# Patient Record
Sex: FEMALE | Race: WHITE | HISPANIC OR LATINO | Employment: FULL TIME | URBAN - METROPOLITAN AREA
[De-identification: names, ages, dates, MRNs, and addresses within clinical notes are randomized per-mention and may not be internally consistent; named-entity substitution may affect disease eponyms.]

---

## 2017-01-30 ENCOUNTER — ALLSCRIPTS OFFICE VISIT (OUTPATIENT)
Dept: OTHER | Facility: OTHER | Age: 22
End: 2017-01-30

## 2018-01-11 NOTE — MISCELLANEOUS
Reason For Visit  Reason For Visit Free Text Note Form: MET WITH PATIENT DURING POST PARTUM VISIT  PATIENT REPORTED IS DOING GOOD  BABY IS BREAST AND BOTTLE FEEDING  PATIENT DENIES ANY DEPRESSION SIGNS AND SYMPTOMS  CLAIMED HAS GOOD SUPORT  Active Problems    1  Chlamydia infection affecting pregnancy (647 60,079 98) (O98 319,A74 9)   2  Depression during pregnancy in third trimester (648 43) (O99 343,F32 9)   3  External hemorrhoids (455 3) (K64 4)   4  Pregnancy (V22 2) (Z33 1)   5  Screen for STD (sexually transmitted disease) (V74 5) (Z11 3)   6  Supervision of normal pregnancy in third trimester (V22 1) (Z34 83)    Current Meds   1  PNV Prenatal Plus Multivitamin 27-1 MG Oral Tablet; Therapy: (Recorded:10Aug2015) to Recorded    Allergies    1  No Known Drug Allergies    2  No Known Environmental Allergies   3   No Known Food Allergies    Signatures   Electronically signed by : BRAD Duque; Feb 29 2016  3:37PM EST                       (Author)

## 2018-01-15 NOTE — MISCELLANEOUS
Provider Comments  Provider Comments:   PT NO SHOW FOR HER ANNUAL APPT        Signatures   Electronically signed by : Holden Ge, ; Apr 5 2017 11:42AM EST                       (Author)

## 2018-01-15 NOTE — RESULT NOTES
Message  Pt  notified of positive chlamydia result and need for treatment  Rx  sent to pharmacy for zithromax 1 gram po  [facility]  Pt  instructed to not have intercourse until confirmation of partner treatment    Has f/u appointment scheduled      Signatures   Electronically signed by : Denton Nam RN; Jan 25 2016  9:59AM EST                       (Author)

## 2018-01-15 NOTE — MISCELLANEOUS
Reason For Visit  Reason For Visit Free Text Note Form: MET WITH PATIENT FOR FOLLOW UP  PATIENT REPORTED IS READY WITH BABY ITEMS  FEELS GOOD  RELATIONSHIP WITH FOB IS GOING WELL  DISCUSSED POST PARTUM DEPRESSION SIGN AND SYMPTOMS AND ACKNOWLEDGEMENT OF PATERNITY FORM  VERBALIZED UNDERSTANDING  NO QUESTION OR CONCERN AT THIS TIME  WILL FOLLOW UP AS NEEDED  Active Problems    1  Chlamydia infection affecting pregnancy (647 60,079 98) (O98 319,A74 9)   2  Depression during pregnancy in third trimester (648 43) (O99 343)   3  External hemorrhoids (455 3) (K64 4)   4  Pregnancy (V22 2) (Z33 1)   5  Screen for STD (sexually transmitted disease) (V74 5) (Z11 3)   6  Supervision of normal pregnancy in third trimester (V22 1) (Z34 83)    Current Meds   1  PNV Prenatal Plus Multivitamin 27-1 MG Oral Tablet; Therapy: (Recorded:10Aug2015) to Recorded    Allergies    1  No Known Drug Allergies    2  No Known Environmental Allergies   3   No Known Food Allergies    Signatures   Electronically signed by : BRAD Garza; Jan 28 2016  2:14PM EST                       (Author)

## 2018-03-13 ENCOUNTER — HOSPITAL ENCOUNTER (EMERGENCY)
Facility: HOSPITAL | Age: 23
Discharge: HOME | End: 2018-03-13
Attending: EMERGENCY MEDICINE | Admitting: EMERGENCY MEDICINE
Payer: COMMERCIAL

## 2018-03-13 VITALS
OXYGEN SATURATION: 100 % | DIASTOLIC BLOOD PRESSURE: 60 MMHG | BODY MASS INDEX: 27 KG/M2 | TEMPERATURE: 98.6 F | RESPIRATION RATE: 18 BRPM | WEIGHT: 143 LBS | SYSTOLIC BLOOD PRESSURE: 120 MMHG | HEIGHT: 61 IN | HEART RATE: 89 BPM

## 2018-03-13 DIAGNOSIS — R10.9 ABDOMINAL PAIN: Primary | ICD-10-CM

## 2018-03-13 DIAGNOSIS — R11.2 NAUSEA AND VOMITING: ICD-10-CM

## 2018-03-13 DIAGNOSIS — B34.9 VIRAL SYNDROME: ICD-10-CM

## 2018-03-13 LAB
ALBUMIN SERPL BCP-MCNC: 3.9 G/DL (ref 3.5–5)
ALP SERPL-CCNC: 47 U/L (ref 46–116)
ALT SERPL W P-5'-P-CCNC: 27 U/L (ref 12–78)
ANION GAP SERPL CALCULATED.3IONS-SCNC: 5 MMOL/L (ref 4–13)
AST SERPL W P-5'-P-CCNC: 15 U/L (ref 5–45)
BACTERIA UR QL AUTO: ABNORMAL /HPF
BASOPHILS # BLD AUTO: 0.01 THOUSANDS/ΜL (ref 0–0.1)
BASOPHILS NFR BLD AUTO: 0 % (ref 0–1)
BILIRUB SERPL-MCNC: 0.62 MG/DL (ref 0.2–1)
BILIRUB UR QL STRIP: NEGATIVE
BUN SERPL-MCNC: 18 MG/DL (ref 5–25)
CALCIUM SERPL-MCNC: 8.3 MG/DL (ref 8.3–10.1)
CHLORIDE SERPL-SCNC: 105 MMOL/L (ref 100–108)
CLARITY UR: CLEAR
CO2 SERPL-SCNC: 28 MMOL/L (ref 21–32)
COLOR UR: YELLOW
CREAT SERPL-MCNC: 0.61 MG/DL (ref 0.6–1.3)
EOSINOPHIL # BLD AUTO: 0.11 THOUSAND/ΜL (ref 0–0.61)
EOSINOPHIL NFR BLD AUTO: 1 % (ref 0–6)
ERYTHROCYTE [DISTWIDTH] IN BLOOD BY AUTOMATED COUNT: 12.5 % (ref 11.6–15.1)
EXT PREG TEST URINE: NORMAL
GFR SERPL CREATININE-BSD FRML MDRD: 127 ML/MIN/1.73SQ M
GLUCOSE SERPL-MCNC: 106 MG/DL (ref 65–140)
GLUCOSE UR STRIP-MCNC: NEGATIVE MG/DL
HCT VFR BLD AUTO: 42.9 % (ref 34.8–46.1)
HGB BLD-MCNC: 14.7 G/DL (ref 11.5–15.4)
HGB UR QL STRIP.AUTO: NEGATIVE
HYALINE CASTS #/AREA URNS LPF: ABNORMAL /LPF
KETONES UR STRIP-MCNC: NEGATIVE MG/DL
LEUKOCYTE ESTERASE UR QL STRIP: NEGATIVE
LIPASE SERPL-CCNC: 120 U/L (ref 73–393)
LYMPHOCYTES # BLD AUTO: 0.59 THOUSANDS/ΜL (ref 0.6–4.47)
LYMPHOCYTES NFR BLD AUTO: 5 % (ref 14–44)
MCH RBC QN AUTO: 30.4 PG (ref 26.8–34.3)
MCHC RBC AUTO-ENTMCNC: 34.3 G/DL (ref 31.4–37.4)
MCV RBC AUTO: 89 FL (ref 82–98)
MONOCYTES # BLD AUTO: 0.66 THOUSAND/ΜL (ref 0.17–1.22)
MONOCYTES NFR BLD AUTO: 5 % (ref 4–12)
NEUTROPHILS # BLD AUTO: 10.91 THOUSANDS/ΜL (ref 1.85–7.62)
NEUTS SEG NFR BLD AUTO: 89 % (ref 43–75)
NITRITE UR QL STRIP: NEGATIVE
NON-SQ EPI CELLS URNS QL MICRO: ABNORMAL /HPF
NRBC BLD AUTO-RTO: 0 /100 WBCS
PH UR STRIP.AUTO: 8.5 [PH] (ref 4.5–8)
PLATELET # BLD AUTO: 252 THOUSANDS/UL (ref 149–390)
PMV BLD AUTO: 11.4 FL (ref 8.9–12.7)
POTASSIUM SERPL-SCNC: 3.6 MMOL/L (ref 3.5–5.3)
PROT SERPL-MCNC: 7.9 G/DL (ref 6.4–8.2)
PROT UR STRIP-MCNC: ABNORMAL MG/DL
RBC # BLD AUTO: 4.83 MILLION/UL (ref 3.81–5.12)
RBC #/AREA URNS AUTO: ABNORMAL /HPF
SODIUM SERPL-SCNC: 138 MMOL/L (ref 136–145)
SP GR UR STRIP.AUTO: 1.02 (ref 1–1.03)
UROBILINOGEN UR QL STRIP.AUTO: 1 E.U./DL
WBC # BLD AUTO: 12.31 THOUSAND/UL (ref 4.31–10.16)
WBC #/AREA URNS AUTO: ABNORMAL /HPF

## 2018-03-13 PROCEDURE — 96374 THER/PROPH/DIAG INJ IV PUSH: CPT

## 2018-03-13 PROCEDURE — 36415 COLL VENOUS BLD VENIPUNCTURE: CPT | Performed by: EMERGENCY MEDICINE

## 2018-03-13 PROCEDURE — 99284 EMERGENCY DEPT VISIT MOD MDM: CPT

## 2018-03-13 PROCEDURE — 81025 URINE PREGNANCY TEST: CPT | Performed by: EMERGENCY MEDICINE

## 2018-03-13 PROCEDURE — 80053 COMPREHEN METABOLIC PANEL: CPT | Performed by: EMERGENCY MEDICINE

## 2018-03-13 PROCEDURE — 85025 COMPLETE CBC W/AUTO DIFF WBC: CPT | Performed by: EMERGENCY MEDICINE

## 2018-03-13 PROCEDURE — 81002 URINALYSIS NONAUTO W/O SCOPE: CPT | Performed by: EMERGENCY MEDICINE

## 2018-03-13 PROCEDURE — 81001 URINALYSIS AUTO W/SCOPE: CPT

## 2018-03-13 PROCEDURE — 83690 ASSAY OF LIPASE: CPT | Performed by: EMERGENCY MEDICINE

## 2018-03-13 PROCEDURE — 96361 HYDRATE IV INFUSION ADD-ON: CPT

## 2018-03-13 RX ORDER — ONDANSETRON 2 MG/ML
4 INJECTION INTRAMUSCULAR; INTRAVENOUS ONCE
Status: COMPLETED | OUTPATIENT
Start: 2018-03-13 | End: 2018-03-13

## 2018-03-13 RX ORDER — ONDANSETRON 4 MG/1
4 TABLET, ORALLY DISINTEGRATING ORAL EVERY 8 HOURS PRN
Qty: 20 TABLET | Refills: 0 | Status: SHIPPED | OUTPATIENT
Start: 2018-03-13 | End: 2020-05-14

## 2018-03-13 RX ORDER — METOCLOPRAMIDE 10 MG/1
10 TABLET ORAL ONCE
Status: DISCONTINUED | OUTPATIENT
Start: 2018-03-13 | End: 2018-03-13 | Stop reason: HOSPADM

## 2018-03-13 RX ORDER — DICYCLOMINE HCL 20 MG
20 TABLET ORAL EVERY 12 HOURS PRN
Qty: 20 TABLET | Refills: 0 | Status: SHIPPED | OUTPATIENT
Start: 2018-03-13 | End: 2020-05-14

## 2018-03-13 RX ORDER — MAGNESIUM HYDROXIDE/ALUMINUM HYDROXICE/SIMETHICONE 120; 1200; 1200 MG/30ML; MG/30ML; MG/30ML
30 SUSPENSION ORAL ONCE
Status: DISCONTINUED | OUTPATIENT
Start: 2018-03-13 | End: 2018-03-13 | Stop reason: HOSPADM

## 2018-03-13 RX ORDER — DICYCLOMINE HCL 20 MG
20 TABLET ORAL ONCE
Status: COMPLETED | OUTPATIENT
Start: 2018-03-13 | End: 2018-03-13

## 2018-03-13 RX ADMIN — SODIUM CHLORIDE 1000 ML: 0.9 INJECTION, SOLUTION INTRAVENOUS at 15:35

## 2018-03-13 RX ADMIN — DICYCLOMINE HYDROCHLORIDE 20 MG: 20 TABLET ORAL at 15:37

## 2018-03-13 RX ADMIN — ONDANSETRON 4 MG: 2 INJECTION INTRAMUSCULAR; INTRAVENOUS at 15:36

## 2018-03-13 NOTE — ED PROVIDER NOTES
History  Chief Complaint   Patient presents with    Abdominal Pain     Patient complains of upper abdominal pain with nausea and vomiting  Denies any diarrhea  HPI  This is a 57-year-old female presenting for evaluation of abdominal pain  Around 6 am, she started to have sharp pain in her mid abdomen, patient was sleeping and it woke her up  Patient had associated nausea and 4 episodes of vomiting  Abdominal pain feels like a tightening and cramping, goes up and down the abdomen, exacerbated with supine position, but improved with vomiting  Vomit was initially food particles, then more food, and then pepto bismal, and then yellow fluid  Patient still with nausea, and abdominal now is about the same  Pain is persistent  Patient has never had pain like this before  Patient denies diarrhea, had normal BM twice today  No fevers, complains of chills  No chest pain, no SOB  Patient ate pork last night, which last night and was concerned it could be the cause, although she has had pork before without issues, then her mom sugested appendicitis and she got worried, which is why she came to ER  No appendicitis in the family  Denies recent travel, however she states her daughter is ill with URI  No dysuria, no hematuria, no vaginal bleeding or discharge  LMP: 3 days ago  PMH: none  PSH: none  FH: neg for biliary disease  SH: denies smoking, drinking and drug use    None       Past Medical History:   Diagnosis Date    Depression        History reviewed  No pertinent surgical history  History reviewed  No pertinent family history  I have reviewed and agree with the history as documented  Social History   Substance Use Topics    Smoking status: Never Smoker    Smokeless tobacco: Never Used    Alcohol use No        Review of Systems    Constitutional:   Positive for appetite change, chills  Negative for fever  HENT: Negative for congestion, rhinorrhea and sore throat      Eyes: Negative for photophobia, pain and visual disturbance  Respiratory: Negative for cough, chest tightness and shortness of breath  Cardiovascular: Negative for chest pain, palpitations and leg swelling  Gastrointestinal: Positive for abdominal pain, nausea and vomiting  Neg for diarrhea  Genitourinary: Negative for dysuria, flank pain and hematuria  Musculoskeletal: Negative for back pain, neck pain and neck stiffness  Skin: Negative for color change, rash and wound  Neurological: Negative for dizziness, numbness and headaches  All other systems reviewed and are negative      Physical Exam  ED Triage Vitals [03/13/18 1519]   Temperature Pulse Respirations Blood Pressure SpO2   98 6 °F (37 °C) (!) 108 18 129/82 98 %      Temp Source Heart Rate Source Patient Position - Orthostatic VS BP Location FiO2 (%)   Oral Monitor Sitting Left arm --      Pain Score       8           Orthostatic Vital Signs  Vitals:    03/13/18 1519 03/13/18 1648   BP: 129/82 120/60   Pulse: (!) 108 89   Patient Position - Orthostatic VS: Sitting        Physical Exam  /60   Pulse 89   Temp 98 6 °F (37 °C) (Oral)   Resp 18   Ht 5' 1" (1 549 m)   Wt 64 9 kg (143 lb)   SpO2 100%   BMI 27 02 kg/m²     General Appearance:  Alert, cooperative, no distress   Head:  Normocephalic, without obvious abnormality, atraumatic   Eyes:  PERRL, conjunctiva/corneas clear, EOM's intact       Nose: Nares normal, septum midline,mucosa normal, no drainage or sinus tenderness   Throat: Lips, mucosa, and tongue normal; teeth and gums normal   Neck: Supple, symmetrical, trachea midline, no adenopathy   Back:   Symmetric, no curvature, ROM normal, no CVA tenderness   Lungs:   Clear to auscultation bilaterally, respirations unlabored   Heart:  Regular rate and rhythm, S1 and S2 normal, no murmur, rub, or gallop   Abdomen:   Soft, Non distended, there is some right hip pain quadrants, epigastric and left upper quadrant tenderness, no right lower quadrant tenderness, mild suprapubic tenderness,   No rebound or guarding,  Negative Bender sign, bowel sounds active all four quadrants     bedside ultrasound shows normal-appearing gallbladder, no stones, gallbladder wall within normal limits, no signs of cholecystitis   Pelvic: Deferred   Extremities: Extremities normal, atraumatic, no cyanosis or edema   Pulses: 2+ and symmetric   Skin: Skin color, texture, turgor normal, no rashes or lesions   Neurologic:      Psychiatric: Moves all extremities, sensation and strength in tact in all extremities    Normal mood and affect         ED Medications  Medications   sodium chloride 0 9 % bolus 1,000 mL (0 mL Intravenous Stopped 3/13/18 1704)   ondansetron (ZOFRAN) injection 4 mg (4 mg Intravenous Given 3/13/18 1536)   dicyclomine (BENTYL) tablet 20 mg (20 mg Oral Given 3/13/18 1537)       Diagnostic Studies  Results Reviewed     Procedure Component Value Units Date/Time    Urine Microscopic [[id number]]  (Abnormal) Collected:  03/13/18 1653    Lab Status:  Final result Specimen:  Urine from Urine, Other Updated:  03/13/18 1708     RBC, UA None Seen /hpf      WBC, UA 2-4 (A) /hpf      Epithelial Cells Occasional /hpf      Bacteria, UA Occasional /hpf      Hyaline Casts, UA None Seen /lpf     POCT urinalysis dipstick [[id number]]  (Abnormal) Resulted:  03/13/18 1656    Lab Status:  Final result Specimen:  Urine Updated:  03/13/18 1656    POCT pregnancy, urine [[id number]]  (Normal) Resulted:  03/13/18 1656    Lab Status:  Final result Updated:  03/13/18 1656     EXT PREG TEST UR (Ref: Negative) neg    ED Urine Macroscopic [[id number]]  (Abnormal) Collected:  03/13/18 1653    Lab Status:  Final result Specimen:  Urine Updated:  03/13/18 1653     Color, UA Yellow     Clarity, UA Clear     pH, UA 8 5 (H)     Leukocytes, UA Negative     Nitrite, UA Negative     Protein, UA Trace (A) mg/dl      Glucose, UA Negative mg/dl      Ketones, UA Negative mg/dl      Urobilinogen, UA 1 0 E U /dl Bilirubin, UA Negative     Blood, UA Negative     Specific Gravity, UA 1 020    Narrative:       CLINITEK RESULT    Comprehensive metabolic panel [[id number]] Collected:  03/13/18 1534    Lab Status:  Final result Specimen:  Blood from Arm, Left Updated:  03/13/18 1612     Sodium 138 mmol/L      Potassium 3 6 mmol/L      Chloride 105 mmol/L      CO2 28 mmol/L      Anion Gap 5 mmol/L      BUN 18 mg/dL      Creatinine 0 61 mg/dL      Glucose 106 mg/dL      Calcium 8 3 mg/dL      AST 15 U/L      ALT 27 U/L      Alkaline Phosphatase 47 U/L      Total Protein 7 9 g/dL      Albumin 3 9 g/dL      Total Bilirubin 0 62 mg/dL      eGFR 127 ml/min/1 73sq m     Narrative:         National Kidney Disease Education Program recommendations are as follows:  GFR calculation is accurate only with a steady state creatinine  Chronic Kidney disease less than 60 ml/min/1 73 sq  meters  Kidney failure less than 15 ml/min/1 73 sq  meters      Lipase [[id number]]  (Normal) Collected:  03/13/18 1534    Lab Status:  Final result Specimen:  Blood from Arm, Left Updated:  03/13/18 1612     Lipase 120 u/L     CBC and differential [[id number]]  (Abnormal) Collected:  03/13/18 1534    Lab Status:  Final result Specimen:  Blood from Arm, Left Updated:  03/13/18 1608     WBC 12 31 (H) Thousand/uL      RBC 4 83 Million/uL      Hemoglobin 14 7 g/dL      Hematocrit 42 9 %      MCV 89 fL      MCH 30 4 pg      MCHC 34 3 g/dL      RDW 12 5 %      MPV 11 4 fL      Platelets 269 Thousands/uL      nRBC 0 /100 WBCs      Neutrophils Relative 89 (H) %      Lymphocytes Relative 5 (L) %      Monocytes Relative 5 %      Eosinophils Relative 1 %      Basophils Relative 0 %      Neutrophils Absolute 10 91 (H) Thousands/µL      Lymphocytes Absolute 0 59 (L) Thousands/µL      Monocytes Absolute 0 66 Thousand/µL      Eosinophils Absolute 0 11 Thousand/µL      Basophils Absolute 0 01 Thousands/µL                  No orders to display         Procedures  Procedures      Phone Consults  ED Phone Contact    ED Course  ED Course as of Mar 14 6583   Tue Mar 13, 2018   1656  Patient says nausea has resolved  Which she went to the bathroom to urinate, she says the pain did resolve as well  However, when she came back to the bed to lay down, the pain returned  On examination, patient without any abdominal tenderness  Labs and urine  unremarkable  Will discharge home per        MDM   Patient's abdominal likely a viral syndrome  Will evaluate with abdominal labs, UA and urine pregnancy  IV fluids given tachycardic, will give bentyl and zofran, reassess  CritCare Time    Disposition  Final diagnoses:   Abdominal pain   Nausea and vomiting   Viral syndrome     Time reflects when diagnosis was documented in both MDM as applicable and the Disposition within this note     Time User Action Codes Description Comment    3/13/2018  4:49 PM Dewaine Flaming Add [R10 9] Abdominal pain     3/13/2018  4:49 PM Dewaine Flaming Add [R11 2] Nausea and vomiting     3/13/2018  4:49 PM Dewaine Flaming Add [B34 9] Viral syndrome       ED Disposition     ED Disposition Condition Comment    Discharge  Diamond Frank discharge to home/self care      Condition at discharge: Stable        Follow-up Information     Follow up With Specialties Details Why Contact Info Additional [address] [facility], DO Internal Medicine Go in 2 days  [address] Emergency Department Emergency Medicine In 1 day If symptoms worsen [address] [place] [address] ED, [address] [place] [address], [id number]        Discharge Medication List as of 3/13/2018  4:52 PM      START taking these medications    Details   dicyclomine (BENTYL) 20 mg tablet Take 1 tablet (20 mg total) by mouth every 12 (twelve) hours as needed (abd pain), Starting Tue 3/13/2018, Print      ondansetron (ZOFRAN-ODT) 4 mg disintegrating tablet Take 1 tablet (4 mg total) by mouth every 8 (eight) hours as needed for nausea or vomiting, Starting Tue 3/13/2018, Print           No discharge procedures on file  ED Provider  Attending physically available and evaluated Xavi Coe I managed the patient along with the ED Attending      Electronically Signed by         Wilbur Goodell, MD  03/14/18 8788

## 2018-03-13 NOTE — ED ATTENDING ATTESTATION
Padma Carreon MD, saw and evaluated the patient  I have discussed the patient with the resident/non-physician practitioner and agree with the resident's/non-physician practitioner's findings, Plan of Care, and MDM as documented in the resident's/non-physician practitioner's note, except where noted  All available labs and Radiology studies were reviewed  At this point I agree with the current assessment done in the Emergency Department  I have conducted an independent evaluation of this patient a history and physical is as follows:  Pt ha midepigastric abd pain that is relieved with vomiting Pt had 2 normal BM today which is normal for her No blood    No history of surgeries in past   PE: alert nad heart reg lungs clear bd soft tender midepigastric and luq area mdm;w ill check labs and treat symptoms    Critical Care Time  CritCare Time    Procedures

## 2018-03-13 NOTE — DISCHARGE INSTRUCTIONS
Acute Abdominal Pain   WHAT YOU NEED TO KNOW:   The cause of your abdominal pain may not be found  If a cause is found, treatment will depend on what the cause is  DISCHARGE INSTRUCTIONS:   Seek care immediately if:   · You vomit blood or cannot stop vomiting  · You have blood in your bowel movement or it looks like tar  · You have bleeding from your rectum  · Your abdomen is larger than usual, more painful, and hard  · You have severe pain in your abdomen  · You stop passing gas and having bowel movements  · You feel weak, dizzy, or faint  Contact your healthcare provider if:   · You have a fever  · You have new signs and symptoms  · Your symptoms do not get better with treatment  · You have questions or concerns about your condition or care  Medicines  may be given to decrease pain, treat an infection, and manage your symptoms  Take your medicine as directed  Call your healthcare provider if you think your medicine is not helping or if you have side effects  Tell him if you are allergic to any medicine  Keep a list of the medicines, vitamins, and herbs you take  Include the amounts, and when and why you take them  Bring the list or the pill bottles to follow-up visits  Carry your medicine list with you in case of an emergency  Manage your symptoms:   · Apply heat  on your abdomen for 20 to 30 minutes every 2 hours for as many days as directed  Heat helps decrease pain and muscle spasms  · Manage your stress  Stress may cause abdominal pain  Your healthcare provider may recommend relaxation techniques and deep breathing exercises to help decrease your stress  Your healthcare provider may recommend you talk to someone about your stress or anxiety, such as a counselor or a trusted friend  Get plenty of sleep and exercise regularly  · Limit or do not drink alcohol  Alcohol can make your abdominal pain worse   Ask your healthcare provider if it is safe for you to drink alcohol  Also ask how much is safe for you to drink  · Do not smoke  Nicotine and other chemicals in cigarettes can damage your esophagus and stomach  Ask your healthcare provider for information if you currently smoke and need help to quit  E-cigarettes or smokeless tobacco still contain nicotine  Talk to your healthcare provider before you use these products  Make changes to the food you eat as directed:  Do not eat foods that cause abdominal pain or other symptoms  Eat small meals more often  · Eat more high-fiber foods if you are constipated  High-fiber foods include fruits, vegetables, whole-grain foods, and legumes  · Do not eat foods that cause gas if you have bloating  Examples include broccoli, cabbage, and cauliflower  Do not drink soda or carbonated drinks, because these may also cause gas  · Do not eat foods or drinks that contain sorbitol or fructose if you have diarrhea and bloating  Some examples are fruit juices, candy, jelly, and sugar-free gum  · Do not eat high-fat foods, such as fried foods, cheeseburgers, hot dogs, and desserts  · Limit or do not drink caffeine  Caffeine may make symptoms, such as heart burn or nausea, worse  · Drink plenty of liquids to prevent dehydration from diarrhea or vomiting  Ask your healthcare provider how much liquid to drink each day and which liquids are best for you  Follow up with your healthcare provider as directed:  Write down your questions so you remember to ask them during your visits  © 2017 [employer] Information is for End User's use only and may not be sold, redistributed or otherwise used for commercial purposes  All illustrations and images included in CareNotes® are the copyrighted property of [employer]  or Michael Diaz  The above information is an  only  It is not intended as medical advice for individual conditions or treatments   Talk to your doctor, nurse or pharmacist before following any medical regimen to see if it is safe and effective for you  Acute Nausea and Vomiting   WHAT YOU NEED TO KNOW:   What is acute nausea and vomiting? Acute nausea and vomiting starts suddenly, gets worse quickly, and lasts a short time  What are some common causes of acute nausea and vomiting? · Food poisoning    · Large amounts of alcohol    · Certain medicines, too much of any medicine, or stopping a regular medicine too quickly    · Early stages of pregnancy    · Infection in the stomach, intestines, or other organs    · Trauma to the head    · Anxiety or stress    · Gastroparesis (a condition that prevents your stomach from emptying properly)    · Metabolic disorders, such as uremia or adrenal insufficiency  What causes acute nausea and vomiting with stomach pain? · Inflammation of the appendix, gallbladder, stomach, pancreas, kidneys, or other organs    · Gallstones    · Bacteria or a parasite in the digestive system    · Heart attack    · Stomach ulcers, or bowel blockage or twisting  What causes acute nausea and vomiting with other signs and symptoms? You may be sweating and have pale skin, problems with digestion, and more saliva than usual  These signs and symptoms may be caused by the following:  · Problems with your heart rate, blood flow to your heart muscle, blood pressure, or stomach fluid    · Increased pressure or bleeding in the brain    · Swelling of the tissue covering the brain    · Migraine or seizures    · Inner ear disorders that cause problems with balance  How is the cause of acute nausea and vomiting diagnosed? Your healthcare provider will examine you and ask about your medical history  Tell your provider about other signs and symptoms you have  Also tell your provider when you last had nausea and vomiting and how long it continued  Include if it happened before, during, or after a meal, and how much you ate   Your provider will need to know how much came out when you vomited, and if it was fast and forceful  Tell your provider if your vomit smelled like bowel movement or had blood or food in it  Tell your provider if the vomit was bright yellow  You may need any of the following:  · Blood tests  may be used to check for infection or inflammation  · X-ray, CT, or MRI pictures  may be used to find an injury or blockage  How may acute nausea and vomiting be treated? The first goal of treatment for nausea and vomiting is to prevent or treat dehydration  Treatment also depends on the cause of the nausea and vomiting  Any medical condition causing your nausea and vomiting will also be treated  Treatment is also aimed at stopping or preventing your signs and symptoms  You may need one or more of the following:  · Medicines  may be given to calm your stomach and stop your vomiting  You may also need medicines to help you feel more relaxed or to stop nausea and vomiting caused by motion sickness  Gastrointestinal stimulants may be used to help empty your stomach and bowels  This can help decrease your nausea and vomiting  · IV fluids  may be given to replace lost fluids and electrolytes  This may be needed it you cannot drink liquids  · Nasogastric (NG) tube: An NG tube is put into your nose, and passes down your throat until it reaches your stomach  Food and medicine may be given through an NG tube if you cannot take anything by mouth  The tube may instead be attached to suction if caregivers need to keep your stomach empty  What can I do to prevent or manage acute nausea and vomiting? · Do not drink alcohol  Alcohol may upset or irritate your stomach  Too much alcohol can also cause acute nausea and vomiting  · Control stress  Headaches due to stress may cause nausea and vomiting  Find ways to relax and manage your stress  Get more rest and sleep  · Drink more liquids as directed  Vomiting can lead to dehydration   It is important to drink more liquids to help replace lost body fluids  Ask your healthcare provider how much liquid to drink each day and which liquids are best for you  Your provider may recommend that you drink an oral rehydration solution (ORS)  ORS contains water, salts, and sugar that are needed to replace the lost body fluids  Ask what kind of ORS to use, how much to drink, and where to get it  · Eat smaller meals, more often  Eat small amounts of food every 2 to 3 hours, even if you are not hungry  Food in your stomach may decrease your nausea  · Talk to your healthcare provider before you take over-the-counter (OTC) medicines  These medicines can cause serious problems if you use certain other medicines, or you have a medical condition  You may have problems if you use too much or use them for longer than the label says  Follow directions on the label carefully  When should I seek immediate care? · You see blood in your vomit or your bowel movements  · You have sudden, severe pain in your chest and upper abdomen after hard vomiting or retching  · You have swelling in your neck and chest      · You are dizzy, cold, and thirsty, and your eyes and mouth are dry  · You are urinating very little or not at all  · You have muscle weakness, leg cramps, and trouble breathing  · Your heart is beating much faster than normal     · You continue to vomit for more than 48 hours  When should I contact my healthcare provider? · You have frequent dry heaves (vomiting but nothing comes out)  · Your nausea and vomiting does not get better or go away after you use medicine  · You have questions or concerns about your condition or care  CARE AGREEMENT:   You have the right to help plan your care  Learn about your health condition and how it may be treated  Discuss treatment options with your caregivers to decide what care you want to receive  You always have the right to refuse treatment   The above information is an  only  It is not intended as medical advice for individual conditions or treatments  Talk to your doctor, nurse or pharmacist before following any medical regimen to see if it is safe and effective for you  © 2017 [employer]  Information is for End User's use only and may not be sold, redistributed or otherwise used for commercial purposes  All illustrations and images included in CareNotes® are the copyrighted property of [employer]  or [address]  Viral Syndrome   WHAT YOU NEED TO KNOW:   What is viral syndrome? Viral syndrome is a term used for a viral infection that has no clear cause  Viruses are spread easily from person to person through the air and on shared items  What are the signs and symptoms of viral syndrome? Signs and symptoms may start slowly or suddenly and last hours to days  They can be mild to severe and can change over days or hours  You may have any of the following:  · Fever and chills    · A runny or stuffy nose     · Cough, sore throat, or hoarseness     · Headache, or pain and pressure around your eyes     · Muscle aches and joint pain     · Shortness of breath or wheezing     · Abdominal pain, cramps, and diarrhea     · Nausea, vomiting, or loss of appetite  How is viral syndrome diagnosed and treated? Your healthcare provider will ask about your symptoms and examine you  Tell him about any recent travel or insect bites  An illness caused by a virus usually goes away in 10 to 14 days without treatment  You may need medicine to help manage your symptoms such as fever, muscle aches, cough, or congestion  How can I manage my symptoms? · Drink liquids as directed  to prevent dehydration  Ask how much liquid to drink each day and which liquids are best for you  Ask if you should drink an oral rehydration solution (ORS)  An ORS has the right amounts of water, salts, and sugar you need to replace body fluids   This may help prevent dehydration caused by vomiting or diarrhea  Do not drink liquids with caffeine  Liquids with caffeine can make dehydration worse  · Get plenty of rest  to help your body heal  Take naps throughout the day  Ask your healthcare provider when you can return to work and your normal activities  · Use a cool mist humidifier  to help you breathe easier if you have nasal or chest congestion  Ask your healthcare provider how to use a cool mist humidifier  · Eat honey or use cough drops  to help decrease throat discomfort  Ask your healthcare provider how much honey you should eat each day  Cough drops are available without a doctor's order  Follow directions for taking cough drops  · Do not smoke and stay away from others who smoke  Nicotine and other chemicals in cigarettes and cigars can cause lung damage  Smoking can also delay healing  Ask your healthcare provider for information if you currently smoke and need help to quit  E-cigarettes or smokeless tobacco still contain nicotine  Talk to your healthcare provider before you use these products  · Wash your hands frequently  to prevent the spread of germs to others  Use soap and water  Use gel hand  when soap and water are not available  Wash your hands after you use the bathroom, cough, or sneeze  Wash your hands before you prepare or eat food  Call 911 or have someone else call 911 if:   · You have a seizure  · You cannot be woken  · You have chest pain or trouble breathing  When should I seek immediate care? · You have a stiff neck, a bad headache, and sensitivity to light  · You feel weak, dizzy, or confused  · You stop urinating or urinate a lot less than normal      · You cough up blood or thick, yellow or green, mucus  · You have severe abdominal pain or your abdomen is larger than usual   When should I contact my healthcare provider? · Your symptoms do not get better with treatment or get worse after 3 days  · You have a rash or ear pain  · You have burning when you urinate  · You have questions or concerns about your condition or care  CARE AGREEMENT:   You have the right to help plan your care  Learn about your health condition and how it may be treated  Discuss treatment options with your caregivers to decide what care you want to receive  You always have the right to refuse treatment  The above information is an  only  It is not intended as medical advice for individual conditions or treatments  Talk to your doctor, nurse or pharmacist before following any medical regimen to see if it is safe and effective for you  © 2017 [employer] [employer] Information is for End User's use only and may not be sold, redistributed or otherwise used for commercial purposes  All illustrations and images included in CareNotes® are the copyrighted property of [employer]  or Michael Diaz

## 2020-05-14 ENCOUNTER — PROCEDURE VISIT (OUTPATIENT)
Dept: OBGYN CLINIC | Facility: CLINIC | Age: 25
End: 2020-05-14

## 2020-05-14 VITALS
SYSTOLIC BLOOD PRESSURE: 128 MMHG | TEMPERATURE: 98.7 F | HEIGHT: 61 IN | DIASTOLIC BLOOD PRESSURE: 75 MMHG | BODY MASS INDEX: 31.91 KG/M2 | HEART RATE: 83 BPM | WEIGHT: 169 LBS

## 2020-05-14 DIAGNOSIS — Z30.011 ENCOUNTER FOR INITIAL PRESCRIPTION OF CONTRACEPTIVE PILLS: ICD-10-CM

## 2020-05-14 DIAGNOSIS — Z30.46 NEXPLANON REMOVAL: Primary | ICD-10-CM

## 2020-05-14 LAB — SL AMB POCT URINE HCG: NEGATIVE

## 2020-05-14 PROCEDURE — 81025 URINE PREGNANCY TEST: CPT | Performed by: NURSE PRACTITIONER

## 2020-05-14 PROCEDURE — 11982 REMOVE DRUG IMPLANT DEVICE: CPT | Performed by: NURSE PRACTITIONER

## 2020-05-14 RX ORDER — LEVONORGESTREL AND ETHINYL ESTRADIOL 0.1-0.02MG
1 KIT ORAL DAILY
Qty: 30 TABLET | Refills: 3 | Status: SHIPPED | OUTPATIENT
Start: 2020-05-14 | End: 2020-07-08 | Stop reason: SDUPTHER

## 2020-07-08 DIAGNOSIS — Z30.011 ENCOUNTER FOR INITIAL PRESCRIPTION OF CONTRACEPTIVE PILLS: ICD-10-CM

## 2020-07-08 RX ORDER — LEVONORGESTREL AND ETHINYL ESTRADIOL 0.1-0.02MG
1 KIT ORAL DAILY
Qty: 90 TABLET | Refills: 0 | Status: SHIPPED | OUTPATIENT
Start: 2020-07-08 | End: 2021-03-05

## 2021-02-18 ENCOUNTER — ULTRASOUND (OUTPATIENT)
Dept: OBGYN CLINIC | Facility: CLINIC | Age: 26
End: 2021-02-18

## 2021-02-18 VITALS
WEIGHT: 176.4 LBS | BODY MASS INDEX: 33.3 KG/M2 | HEART RATE: 94 BPM | HEIGHT: 61 IN | SYSTOLIC BLOOD PRESSURE: 111 MMHG | DIASTOLIC BLOOD PRESSURE: 83 MMHG

## 2021-02-18 DIAGNOSIS — N91.2 AMENORRHEA: Primary | ICD-10-CM

## 2021-02-18 LAB — SL AMB POCT URINE HCG: POSITIVE

## 2021-02-18 PROCEDURE — 81025 URINE PREGNANCY TEST: CPT | Performed by: OBSTETRICS & GYNECOLOGY

## 2021-02-18 PROCEDURE — 99213 OFFICE O/P EST LOW 20 MIN: CPT | Performed by: OBSTETRICS & GYNECOLOGY

## 2021-02-18 NOTE — PROGRESS NOTES
FIRST TRIMESTER OBSTETRIC ULTRASOUND  02/18/2021     INDICATION: Amenorrhea, viability  LMP 12/20/2020    COMPARISON: None  TECHNIQUE:   Transvaginal imaging was performed to assess the gestation, myometrial/endometrial architecture and ovarian parenchymal detail  The study includes volumetric sweeps and traditional still imaging technique  FINDINGS:     A single intrauterine gestation is identified  Cardiac activity is detected at 185 bpm       YOLK SAC:  Present and normal in size and appearance  MEAN CROWN RUMP LENGTH:  25 mm = 9 weeks 2 days   AMNIOTIC FLUID/SAC SHAPE:  Within expected normal range  UTERUS/ADNEXA:   Uterus: 6 69 cm x 6 19 cm x 5 59 cm   Right ovary 2 4 cm x 4 47 cm x 2 93 cm, with 1 21 cm cyst, likely corpus luteum  Left ovary 1 44 cm  x 2 00 cm x 1 42 cm  No free fluid identified  IMPRESSION:    According to , will date based off LMP (09/26/2021), current gestation 8 weeks 4 days   Fetal cardiac activity detected  No adnexal masses seen      [facility] nurse intake visit, prenatal history and physical    [place], DO

## 2021-02-25 ENCOUNTER — INITIAL PRENATAL (OUTPATIENT)
Dept: OBGYN CLINIC | Facility: CLINIC | Age: 26
End: 2021-02-25

## 2021-02-25 VITALS
HEART RATE: 83 BPM | WEIGHT: 178 LBS | SYSTOLIC BLOOD PRESSURE: 121 MMHG | BODY MASS INDEX: 33.61 KG/M2 | HEIGHT: 61 IN | DIASTOLIC BLOOD PRESSURE: 76 MMHG

## 2021-02-25 DIAGNOSIS — Z3A.09 9 WEEKS GESTATION OF PREGNANCY: Primary | ICD-10-CM

## 2021-02-25 DIAGNOSIS — Z34.91 PREGNANT AND NOT YET DELIVERED IN FIRST TRIMESTER: ICD-10-CM

## 2021-02-25 PROCEDURE — PNV

## 2021-02-25 NOTE — LETTER
Work Letter    [employer]  1995  [address] [place] [id number]    Dear Gillian Contreras,      02/25/21        Your employee is a patient at [facility]  We recommend that all pregnant women:    1  Have a well-ventilated workspace  2  Wear low-heeled shoes  3  Work no more than 40 hours per week  4  Have a 15 minute break every 2 hours and at least 30 minutes for a meal break  5  Use good body mechanics by bending at your knees to avoid back strain and lift no more than 20 pounds without assistance  Will need assistance with lifting over 20 lbs  6  Have ready access to bathrooms and water  She may continue to work until her due date unless medical complications arise  We anticipate she may return to work in 6-8 weeks after delivery       Sincerely,  1 [address] Fr[address]oso

## 2021-02-25 NOTE — PATIENT INSTRUCTIONS
1 Donovan Fragoso would like to say Congratulations on your pregnancy! We are happy that you have chosen us to be your health care provider during your pregnancy  Your health, the health of your unborn child (children) and your family is important to us  Now, more than ever, your health will be most important to you  Your baby's growth and progress can be affected by how well you take care of yourself  It's a good idea to plan ahead  It is a known fact that women who receive care early in pregnancy and throughout their pregnancy have healthier babies  Visits will be scheduled for you throughout your pregnancy  It is your duty to keep your appointments and follow directions for your care  The number of visits will be decided by your doctor  Don't be afraid to ask questions  Talk with your nurses and providers about your plans for birth and any concerns you may have  Maternal Fetal Medicine (MFM) at [phone]   - 1 hour appointment, only 1 support person allowed, NO children     Blood work : Please complete prior to your H&P appointment  o You do NOT have to fast for any blood work unless instructed  - CBC, Blood type and antibody screen, Urinalysis, Random glucose level, HIV, Syphilis, Hepatitis B   History &Physical: H&P appointment   o Physical exam  o Pelvic exam: GC/CT testing  o If needed: Pap smear  - Routine prenatal   o Visits every 4 weeks until 28 weeks    Stay healthy during your pregnancy     Eat a variety of healthy foods  Healthy foods include fruits, vegetables, whole-grain breads, low-fat dairy foods, beans, lean meats, and fish  Drink liquids as directed  Ask how much liquid to drink each day and which liquids are best for you   Caffeine: It is not clear how caffeine affects pregnancy  Limit your intake of caffeine to avoid possible health problems  o Limit caffeine to less than 200 milligrams each day     - Caffeine may be found in coffee, tea, cola, sports drinks, and chocolate   Foods that contain mercury:  Mercury is naturally found in almost all types of fish and shellfish  Some types of fish absorb higher levels of mercury that can be harmful to an unborn baby  Eat only fish and shellfish that are low in mercury  o Limit your intake of fish to 2 servings each week  - Choose fish low in mercury such as canned light tuna, shrimp, crab, salmon, cod, or tilapia   Do not  eat fish high in mercury such as swordfish, tilefish, val mackerel, and shark   - Each week, you may eat up to 12 ounces of fish or shellfish that have low levels of mercury  These include shrimp, canned light tuna, salmon, pollock, and catfish    - Eat only 6 ounces of albacore (white) tuna per week  Albacore tuna has more mercury than canned tuna   Take prenatal vitamins as directed  Your need for certain vitamins and minerals, such as folic acid, increases during pregnancy  Prenatal vitamins provide some of the extra vitamins and minerals you need  Prenatal vitamins may also help to decrease the risk of certain birth defects   Weight: Ask how much weight you should gain during your pregnancy  Too much or too little weight gain can be unhealthy for you and your baby   Exercise: Talk to your healthcare provider about exercise  Moderate exercise can help you stay fit  Your healthcare provider will help you plan an exercise program that is safe for you during pregnancy  o Drink plenty of fluids while exercising to stay hydrated  Be careful to avoid overheating  o AVOID exercises that can make you lose your balance    o Activities that can put your baby at risk i e  horseback riding, scuba diving, skiing or snowboarding  o After your first trimester, avoid exercises that require you to lay flat on your back  o AVOID exceeding a heart rate greater than 140 beats per minute   As long as you are able to hold a conversation while exercising your heart rate is likely acceptable   ALWAYS wear your seat belt   o The shoulder belt should lay across your chest (between your breasts) and away from your neck    o Secure the lap belt below your belly so that it fits snugly across your hips and pelvic bone   Perform Kegel exercise  o Imagine yourself stopping the flow of urine, yamilka the muscles of your pelvic floor  Hold that contraction for 10 seconds and release  - They can be repeated 10-20 times per day   Do not smoke  If you smoke, it is never too late to quit  Smoking increases your risk of a miscarriage and other health problems during your pregnancy  Smoking can cause your baby to be born too early or weigh less at birth  Ask your healthcare provider for information if you need help quitting   Do not drink alcohol  Alcohol passes from your body to your baby through the placenta  It can affect your baby's brain development and cause fetal alcohol syndrome (FAS)  FAS is a group of conditions that causes mental, behavior, and growth problems  o Alcohol:  Do not drink alcohol during pregnancy  Alcohol can increase your risk of a miscarriage (losing your baby)   Never use illegal or street drugs (such as marijuana or cocaine) while you are pregnant  Safety tips:   Avoid hot tubs and saunas  Do not use a hot tub or sauna while you are pregnant, especially during your first trimester  Hot tubs and saunas may raise your baby's temperature and increase the risk of birth defects   Avoid toxoplasmosis  This is an infection caused by eating raw meat or being around infected cat feces  It can cause birth defects, miscarriages, and other problems  Wash your hands after you touch raw meat  Make sure any meat is well-cooked before you eat it  Avoid raw eggs and unpasteurized milk  Use gloves or ask someone else to clean your cat's litter box while you are pregnant   Ask your healthcare provider about travel    The most comfortable time to travel is during the second trimester  Ask your healthcare provider if you can travel after 36 weeks  You may not be able to travel in an airplane after 36 weeks  He may also recommend that you avoid long road trips  Pregnancy Diet  WHAT YOU NEED TO KNOW:   What is a healthy diet during pregnancy? A healthy diet during pregnancy is a meal plan that provides the amount of calories and nutrients you need during pregnancy  Your body needs extra calories and nutrients to support your growing baby  You need to gain the right amount of weight for a healthy baby and pregnancy  Babies born at a healthy weight have a lower risk of certain health problems at birth and later in life  A healthy diet may help you avoid gaining too much weight  Too much weight gain may cause problems for you during your pregnancy and delivery   What should I AVOID while I am pregnant?   o Raw and undercooked foods: You should not eat undercooked or raw meat, poultry, eggs, fish, or shellfish (shrimp, crab, lobster)  Cook leftover foods and ready-to-eat foods such as hot dogs until they are steaming hot    o Unpasteurized food:  Unpasteurized foods are foods that have not gone through the heating process (pasteurization) that destroys bacteria  You should not drink milk, juice, or cheese that has not been pasteurized  This includes Brie, feta, Camembert, blue, and [place] cheeses   Which foods can I eat while I am pregnant? Eat a variety of foods from each of the food groups listed below  Your dietitian will tell you how many servings you should have from each food group each day to get enough calories  The amount of calories you need depends on your daily activity, your weight before pregnancy, and current weight gain  Healthcare providers divide pregnancy into 3 periods of time called trimesters  In the first trimester, you usually do not need extra calories   In the second and third trimesters, most women should eat about 300 extra calories each day   What vitamin and mineral supplements may I need? Your healthcare provider will tell you if you need a supplement and the type you should take  Talk to your healthcare provider before you take any other kind of supplement, including herbal (natural) supplements  o Prenatal vitamins:  Eat a variety of healthy foods, even if you take a prenatal vitamin  If you forget to take your vitamin, do not take double the amount the next day    o Folic acid:  You need at least 490 mcg of folic acid each day before you get pregnant  Folic acid helps to form your baby's brain and spinal cord in early pregnancy  During pregnancy, your daily need for folic acid increases to about 600 mcg  Get folic acid each day by eating citrus fruits and juices, green leafy vegetables, liver, or dried beans  Folic acid is also added to foods such as breakfast cereals, bread products, flour, and pasta    o Iron:  Iron is a mineral the body needs to make hemoglobin, which is a part of red blood cells  Hemoglobin helps your blood carry oxygen from the lungs to the rest of your body  Foods that are good sources of iron are meat, poultry, fish, beans, spinach, and fortified cereals and breads  Your body will absorb iron better from non-meat sources if you have a source of vitamin C at the same time  Drink tea and coffee separately from iron-fortified foods and iron supplements  You need about 30 mg of iron each day during pregnancy  o Calcium and vitamin D:  Women who do not eat dairy products may need a calcium and vitamin D supplement  Talk to your healthcare provider about calcium supplements if you do not regularly eat good sources of calcium  The amount of calcium you need is about 1,300 mg if you are between 15and 25years old and 1,000 mg if you are 23to 48years old   What other healthy guidelines should I follow? o Vegetarians and vegans:   If you are a vegetarian or vegan, get enough protein, vitamin B12, and iron during your pregnancy  Some non-meat sources of these nutrients are fortified cereals, nut butters, soy products (tofu and soymilk), nuts, grains, and legumes  These nutrients are also found in eggs and milk products  o Cravings: You may have cravings for certain foods during your pregnancy  Foods that are high in calories, fat, and sugar should not replace healthy food choices  Some women have cravings for unusual substances such as vikki, dirt, laundry starch, ice, and chalk  This condition is called pica  These may lead to health problems such as anemia and cause other health problems  Nausea and Vomiting in Pregnancy  Nausea and vomiting in pregnancy  can happen any time of day  These symptoms usually start before the 9th week of pregnancy, and end by the 14th week (second trimester)  Some women can have nausea and vomiting for a longer time  These symptoms can affect some women throughout the entire pregnancy  Nausea and vomiting do not harm your baby  These symptoms can make it hard for you to do your daily activities   Seek care immediately if:   o You have signs of dehydration  Examples are dark yellow urine, dry mouth and lips, dry  skin, fast heartbeat, and urinating less than usual   o You have severe abdominal pain   o You feel too weak or dizzy to stand up   o You see blood in your vomit or bowel movements  o Contact your healthcare provider if:   o You vomit more than 4 times in 1 day   o You have not been able to keep liquids down for more than 1 day   o You lose more than 2 pounds   o You have a fever   o Your nausea and vomiting continue longer than 14 weeks    o You have questions or concerns about your condition or care  Treatment  for nausea and vomiting in pregnancy is usually not needed  Talk to your healthcare provider if your symptoms do not decrease with the changes suggested below  You may need vitamin B6 and medicine if these changes do not help, or your symptoms become severe  Nutrition changes you can make to manage nausea and vomiting:    Eat small meals throughout the day instead of 3 large meals  You may be more likely to have nausea and vomiting when your stomach is empty  Eat foods that are low in fat and high in protein  Examples are lean meat, beans, turkey, and chicken without the skin  Eat a small snack, such as crackers, dry cereal, or a small sandwich before you go to bed   Eat some crackers or dry toast before you get out of bed in the morning  Get out of bed slowly  Sudden movements could cause you to get dizzy and nauseated   Eat bland foods when you feel nauseated  Examples of bland foods include dry toast, dry cereal, plain pasta, white rice, and bread  Other bland foods include saltine crackers, bananas, gelatin, and pretzels  Avoid spicy, greasy, and fried foods  Avoid any other foods that make you feel nauseated   Drink liquids that contain ginger  Drink ginger ale made with real ginger or ginger tea made with fresh grated ginger  Ginger capsules or ginger candies may also help to decrease nausea and vomiting   Drink liquids between meals instead of with meals  Wait at least 30 minutes after you eat to drink liquids  Drink small amounts of liquids often throughout the day to prevent dehydration  Ask how much liquid you should drink each day   Other changes you can make to manage nausea and vomiting:    Avoid smells that bother you  Strong odors may cause nausea and vomiting to start, or make it worse  Take a short walk, turn on a fan, or try to sleep with the window open to get fresh air  When you are cooking, open windows to get rid of smells that may cause nausea   Do not brush your teeth right after you eat  if it makes you nauseated   Rest when you need to  Start activity slowly and work up to your usual routine as you start to feel better   Talk to your healthcare provider about your prenatal vitamins    Prenatal vitamins can cause nausea for some women  Try taking your prenatal vitamin at night or with a snack  If this change does not help, your healthcare provider may recommend a different type of vitamin   Do not use any medicines, vitamins, or supplements to manage your symptoms without asking your healthcare provider  Many medicines can harm an unborn baby   Light to moderate exercise  may help to decrease your symptoms  It may also help you to sleep better at night  Ask your healthcare provider about the best exercise plan for you  Breastfeeding    Benefits of breastfeeding  o Are less likely to develop allergies  o Have increased protection against bacterial meningitis  o Have fewer middle ear infections  o Have fewer learning disabilities  o Have fewer behavioral difficulties  o Have higher IQ's  o Have lower rates of respiratory illness  o Have lower obesity  o Are less likely to develop juvenile diabetes and some childhood cancers  o Are less likely to die from Sudden Infant Death Syndrome  o A healthier baby means fewer visits to the doctor and the pharmacy  o Breastfeeding is environmentally friendly  There is nothing to throw away    o Breastfeeding is free; formula can cost over $1000 for the first year of life  o There is less vaginal bleeding immediately after delivery and a faster return to your pre-pregnant size  - Mothers who breastfeed a lifetime total of 2 years have:   A 40% decreased risk of breast cancer    A decreased risk of ovarian cancer   Lower rates of osteoporosis, diabetes and heart disease   Importance of exclusive breastfeeding  o By providing the ideal food for the healthy growth and development of infants, exclusive  infants receive only breast milk    o Exclusive breastfeeding for the first 6 months is very important to improve an infant's health and reduce childhood illness        Exclusive breastfeeding for the first 6 months  o The American Academy of Pediatrics recommends exclusive breastfeeding for the first six months and continued breastfeeding with supplementation of solids for the next 6 months   Early initiation of breastfeeding  o Women who feed their infants within the first hour of birth is referred to as Earnstine Rides initiation of breastfeeding and ensures that the  receives colostrum  o Colostrum is rich in antibodies and essential nutrients   Rooming-In  o May stabilize 's breathing and heart rate  o Reduce crying  o Improve ability for  to maintain temperature and blood sugar levels  o Early stimulation of baby's immune system  o Calming effects  o Easier and faster bonding   o Rooming-in promotes getting to know your    o Rooming-in makes breastfeeding easier  o Women who room-in with their newborns make more milk and produce a good milk supply earlier  o Becomes easier to recognize baby's feeding cues  o Infants have longer breastfeeding sessions  o Women who room-in with their newborns are more likely to exclusively breastfeed compared to women who are  from their newborns   Skin-to-Skin  o Skin to skin contact should happen regardless of a woman's feeding preference or the mode of delivery  o After the delivery of your baby, it's important that a healthy mother and her baby have uninterrupted skin-to-skin contact   o Skin to skin contact should occur immediately after birth for a least one-two hours and until after the first feeding for breastfeeding mothers  o Skin-to-skin contact means placing dried, unclothes newborns on their mother's bare chest, with warm blankets covering the baby's back  o All routine procedures such as maternal and  assessments can take place during skin-to-skin contact or can be delayed until after the sensitive period immediately after birth  We are proud to offer extensive educational and support services to encourage mothers to breastfeed    This service offers information, education, and support for women who want to breast feed  Mothers can leave a message or breastfeeding question on the line anytime of the day  Nurses will respond within 72 hours  The Breast Feeding Answer Line is [phone] ([phone])  Please DO NOT leave urgent or emergent messages on this message line  Please DO contact your physician DIRECTLY if you have any urgent questions or concerns  In the event of a suspected emergency medical condition please CALL 911 or [address]      Attaching your baby at the Breast (English): [email or web address]    Attaching your baby at the Breast (German):  [email or web address]        Coronavirus (COVID-19) pregnancy FAQs: Medical experts answer your questions  From [employer] cy  [email or web address]-[email or web address]-[email or web address]-[email or web address]-[email or web address]  bc (courtesy of [facility])  As of 3/18/20  By Pascual Nair 39  Medically reviewed by Society for Maternal-Fetal Medicine       We asked parents-to-be to send us their most pressing questions about coronavirus (COVID-19)  Among them: Is it still safe to deliver in a hospital? What if my ob-gyn has the virus? We sent those questions to the top docs at the Society for Maternal-Fetal Medicine  Here are their answers  The coronavirus (COVID-19) pandemic has been declared a national emergency in the [facility] by [employer]  Moms-to-be like you are concerned about everything from getting medicines to managing disruptions at work  But above and beyond any worry about lifestyle changes is a focus on your health and the impact of COVID-19 on your pregnancy  We asked obstetrics doctors who handle the most complicated pregnancy issues to answer your questions about the coronavirus   Here are their responses, provided by Dr Yeny Juares and her colleagues at the [employer]  Am I at more risk for COVID-19 if I'm pregnant? We don't know  Pregnancy does change your immune system in ways that might make you more susceptible to viral respiratory infections like COVID-19  If you become infected, you might also be at higher risk for more severe illness compared to the general population  Although this does not appear to be the case with COVID-19, based on limited data so far, a higher risk has been true for pregnant women with other coronavirus infections (SARS-CoV and MERS-CoV) and other viral respiratory infections, such as flu  It's important to take preventive actions to avoid infection, such as washing your hands often and avoiding people who are sick  How might coronavirus affect my pregnancy? Again, we don't know  Women with other coronavirus infections (such as SARS-CoV) did not have miscarriage or stillbirth at higher rates than the general population  We know that having other respiratory viral infections during pregnancy, such as flu, has been associated with problems like low birth weight and  birth  Also, having a high fever early in pregnancy may increase the risk of certain birth defects  Could I transmit coronavirus to my baby during pregnancy or delivery? We don't know whether you could transmit COVID-19 to your baby before or during delivery  However, among the few case studies of infants born to mothers with COVID-23 published in peer-reviewed literature, none of the infants tested positive for the virus  Also, there was no virus detected in samples of amniotic fluid or breast milk  There have been a few reports of newborns as young as a few days old with infection, suggesting that a mother can transmit the infection to her infant through close contact after delivery    There have been no reports of mother-to-baby transmission for other coronaviruses (MERS-CoV and SARS-CoV), although only limited information is available  Is it safe for me to deliver at a hospital where there have been COVID-19 cases? Yes  We know that COVID-19 is a very scary virus  The good news is that hospitals are taking great precautions to keep patients and healthcare providers safe  When a patient is even suspected to have COVID-19, they are placed in a negative pressure room  (Think of these rooms as vacuums that suck and filter the air so it's safe for the other people in the hospital)  This makes it possible for you to deliver at the hospital without putting you or your baby at risk  Hospitals are also implementing stricter visiting policies to keep patients safe  It's worth calling your hospital to check if there are any new regulations to be aware of  What plans should I make now in case the hospital system is overwhelmed when it's time for me to deliver? This is a great question to talk with your doctor or midwife about when you're 34 to 36 weeks pregnant  Every hospital is making different plans for dealing with this scenario  I work in healthcare  Should I ask my doctor to excuse me from work until the baby is born? What if I work in a school, the travel [address], or some other high-risk setting? Healthcare facilities should take care to limit the exposure of pregnant employees to patients with confirmed or suspected COVID-19, just as they would with other infectious cases  If you continue working, be sure to follow the CDC's risk assessment and infection control guidelines  If you work in a school, [employer], or other high-risk setting, talk with your employer about what it's doing to protect employees and minimize infection risks  Wash your hands often  What if my OB gets COVID-19? If your doctor or midwife tests positive for COVID-19, they will need to be quarantined until they recover and are no longer at risk of transmitting the virus   In this case, you'll be assigned to another OB in your doctor's practice (or you may choose another practitioner yourself)  Ask your new OB or your doctor's office if you should self-quarantine or be tested for the virus  (It will depend on when you last saw your provider and when that person tested positive )    Should we hold off on trying to conceive because of COVID-19? At this time, there's no reason to hold off on trying to get pregnant, but the data we have is really limited  For example, we don't think the virus causes birth defects or increases your risk of miscarriage  But we don't know whether you could transmit COVID-19 to your baby before or during delivery  We also don't know if the virus lives in semen or can be sexually transmitted  We have a babymoon scheduled in the next few months - should we cancel? If you're planning to travel internationally or on a cruise, you should strongly consider canceling  At this time, the virus has reached more than 140 countries, and there are travel bans to [place], most of [place], and [place]  Places where large numbers of people gather are at highest risk, especially airports and cruise ships  If you're planning travel in the [place] , note that any travel setting increases your risk of exposure, and there may be travel bans even in [place] by the time you're scheduled to go  Even if you're state is not blocked, you'll definitely want to avoid traveling to communities where the virus is spreading  To find out where these places are, check The [place] Times map based on CDC data  For the most current advice to help you avoid exposure, check the CDC's COVID-19 travel page  Will the hospital separate me from my  and keep the baby in quarantine?   If you test positive for COVID-19 or have been exposed but have no symptoms, the CDC, [employer] of Obstetricians and Gynecologists, and the Society for [place] [employer] all recommend that you be  from your baby to decrease the risk of transmission to the baby  This would last until you are no longer at risk of transmitting the virus  If you do not have COVID-19 and have not been exposed to the virus, the hospital will not separate you from your   My hospital is restricting visitors and only allowing one support person  If my support person leaves after the delivery, will they be allowed to come back? Every hospital has different policies  Contact your hospital or labor and delivery unit a week or so before delivery to get the most up-to-date restrictions  In general, if your support person needs to leave, they would be allowed back unless they knew they were exposed to COVID-19 after leaving your company  Surya understands that the coronavirus (COVID-19) pandemic is an evolving story and that your questions will change over time  We'll continue asking moms and dads in our Community what they want to know, and we'll get the answers from experts to keep them - and you - informed and supported  My mom was planning to fly here to help me care for my new baby after delivery  Should I tell her not to come? Yes  If your mom is over 61 or has any serious chronic medical conditions (such as heart disease, lung disease, or diabetes), she is at higher risk of serious illness from COVID-19 and should avoid air travel  And remember that any travel setting increases a person's risk of exposure  So, it may be risky to have her around the baby after she has been traveling  For the most current advice on traveling, check the CDC's COVID-19 travel page  Surya understands that the coronavirus pandemic is an evolving story and that your questions will change over time  We'll continue asking moms and dads in our Community what they want to know, and we'll get the answers from experts to keep them - and you - informed and supported
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
## 2021-02-25 NOTE — LETTER
Dentist Letter    Dawit Mancilal  1995  [address] [address]          02/25/21    We have had several requests from local dentist requesting permission to perform procedures on our patients who are pregnant  We wish to respond with this letter regarding some of the more routine procedures that we have been asked about  The following procedures may be performed on our obstetric patients:   1  Administration of local anesthesia   2  Administration of antibiotics such as PCN, Ampicillin, and Erythromycin  3  Administration of pain medications such as Tylenol, Tylenol with codeine, and if needed Percocet  4  Shielded X-rays    Should you have any questions, please do not hesitate to contact at [phone]          Sincerely,    [facility]   [phone]

## 2021-02-25 NOTE — LETTER
[facility] Letter    Geovany Thomas  1995  [address]  [address]       02/25/21          Geovany Thomas is a patient and under our care in our office  Diamond Frank's Estimated Date of Delivery: 09/26/2021  Any questions or concerns feel free to contact our office       Thank you,  [address]  [id number] [facility]/Katrin Hernandez 15  [address] [facility]/Lucius Yoo 82  [facility]/[address]  [phone]

## 2021-03-02 ENCOUNTER — PATIENT OUTREACH (OUTPATIENT)
Dept: OBGYN CLINIC | Facility: CLINIC | Age: 26
End: 2021-03-02
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
## 2021-03-04 ENCOUNTER — TELEPHONE (OUTPATIENT)
Dept: OBGYN CLINIC | Facility: CLINIC | Age: 26
End: 2021-03-04

## 2021-03-05 ENCOUNTER — PATIENT OUTREACH (OUTPATIENT)
Dept: OBGYN CLINIC | Facility: CLINIC | Age: 26
End: 2021-03-05

## 2021-03-05 ENCOUNTER — ROUTINE PRENATAL (OUTPATIENT)
Dept: OBGYN CLINIC | Facility: CLINIC | Age: 26
End: 2021-03-05

## 2021-03-05 VITALS
SYSTOLIC BLOOD PRESSURE: 123 MMHG | DIASTOLIC BLOOD PRESSURE: 67 MMHG | HEIGHT: 61 IN | WEIGHT: 178 LBS | BODY MASS INDEX: 33.61 KG/M2 | HEART RATE: 87 BPM

## 2021-03-05 DIAGNOSIS — Z12.4 SCREENING FOR CERVICAL CANCER: ICD-10-CM

## 2021-03-05 DIAGNOSIS — Z11.3 SCREEN FOR STD (SEXUALLY TRANSMITTED DISEASE): ICD-10-CM

## 2021-03-05 DIAGNOSIS — Z72.51 HIGH RISK HETEROSEXUAL BEHAVIOR: ICD-10-CM

## 2021-03-05 DIAGNOSIS — Z3A.10 10 WEEKS GESTATION OF PREGNANCY: Primary | ICD-10-CM

## 2021-03-05 DIAGNOSIS — O21.9 NAUSEA AND VOMITING IN PREGNANCY: ICD-10-CM

## 2021-03-05 PROBLEM — Z34.91 FIRST TRIMESTER PREGNANCY: Status: ACTIVE | Noted: 2021-03-05

## 2021-03-05 LAB
SL AMB  POCT GLUCOSE, UA: NORMAL
SL AMB POCT KETONES,UA: NORMAL
SL AMB POCT URINE PROTEIN: NORMAL

## 2021-03-05 PROCEDURE — 81002 URINALYSIS NONAUTO W/O SCOPE: CPT | Performed by: OBSTETRICS & GYNECOLOGY

## 2021-03-05 PROCEDURE — 87591 N.GONORRHOEAE DNA AMP PROB: CPT | Performed by: OBSTETRICS & GYNECOLOGY

## 2021-03-05 PROCEDURE — PNV: Performed by: OBSTETRICS & GYNECOLOGY

## 2021-03-05 PROCEDURE — G0145 SCR C/V CYTO,THINLAYER,RESCR: HCPCS | Performed by: OBSTETRICS & GYNECOLOGY

## 2021-03-05 PROCEDURE — 87491 CHLMYD TRACH DNA AMP PROBE: CPT | Performed by: OBSTETRICS & GYNECOLOGY

## 2021-03-05 RX ORDER — PYRIDOXINE HCL (VITAMIN B6) 25 MG
25 TABLET ORAL DAILY
Qty: 30 TABLET | Refills: 3 | Status: SHIPPED | OUTPATIENT
Start: 2021-03-05 | End: 2021-07-29

## 2021-03-05 RX ORDER — SERTRALINE HYDROCHLORIDE 25 MG/1
25 TABLET, FILM COATED ORAL DAILY
Qty: 30 TABLET | Refills: 3 | Status: SHIPPED | OUTPATIENT
Start: 2021-03-05 | End: 2021-07-29
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
## 2021-03-05 NOTE — PATIENT INSTRUCTIONS
Depression   AMBULATORY CARE:   Depression  is a medical condition that causes feelings of sadness or hopelessness that do not go away  Depression may cause you to lose interest in things you used to enjoy  These feelings may interfere with your daily life  Common symptoms include the following:   · Appetite changes, or weight gain or loss    · Trouble going to sleep or staying asleep, or sleeping too much    · Fatigue or lack of energy    · Feeling restless, irritable, or withdrawn    · Feeling worthless, hopeless, discouraged, or guilty    · Trouble concentrating, remembering things, doing daily tasks, or making decisions    · Thoughts about hurting or killing yourself    Call your local emergency number (911 in the [address] [facility]) if:   · You think about harming yourself or someone else  · You have done something on purpose to hurt yourself  Call your therapist or doctor if:   · Your symptoms do not improve  · You cannot make it to your next appointment  · You have new symptoms  · You have questions or concerns about your condition or care  The following resources are available at any time to help you, if needed:   · [address] [place] [address]: [phone] ([phone])     · Suicide Hotline: 8-795.975.9849 (4-281-TYTEJXC)     · For a list of international numbers: https://save org/find-help/international-resources/    Treatment for depression  may include medicine to relieve depression  Medicine is often used together with therapy  Therapy is a way for you to talk about your feelings and anything that may be causing depression  Therapy can be done alone or in a group  It may also be done with family members or a significant other  Self-care:   · Get regular physical activity  Try to be active for 30 minutes, 3 to 5 days a week  Physical activity can help relieve depression  Work with your healthcare provider to develop a plan that you enjoy   It may help to ask someone to be active with you     · Create a regular sleep schedule  A routine can help you relax before bed  Listen to music, read, or do yoga  Try to go to bed and wake up at the same time every day  Sleep is important for emotional health  · Eat a variety of healthy foods  Healthy foods include fruits, vegetables, whole-grain breads, low-fat dairy products, lean meats, fish, and cooked beans  A healthy meal plan is low in fat, salt, and added sugar  · Do not drink alcohol or use drugs  Alcohol and drugs can make depression worse  Talk to your therapist or doctor if you need help quitting  Follow up with your healthcare provider as directed: Your healthcare provider will monitor your progress at follow-up visits  He or she will also monitor your medicine if you take antidepressants  Your healthcare provider will ask if the medicine is helping  Tell him or her about any side effects or problems you may have with your medicine  The type or amount of medicine may need to be changed  Write down your questions so you remember to ask them during your visits  © Copyright [address] Information is for End User's use only and may not be sold, redistributed or otherwise used for commercial purposes  All illustrations and images included in CareNotes® are the copyrighted property of A D A M , Inc  or [address] [place], [place] [id number]   The above information is an  only  It is not intended as medical advice for individual conditions or treatments  Talk to your doctor, nurse or pharmacist before following any medical regimen to see if it is safe and effective for you

## 2021-03-05 NOTE — PROGRESS NOTES
SABRINA WASHINGTON met with Pt to address depression score of 21 with no harm to self  Pt presented with appropriate affect  Reported she was doing fine until 2 days ago when she started to cry for no reason and yesterday was a very bad day  Pt states she knows is the hormones and is hoping it will go away after she completes her first trimester  Pt verbalized feeling sad because FOB gets very overwhelmed trying to help Pt and that make HER MORE SAD  Pt denies any SI / HI or any problems at home or work  SABRINA WASHINGTON provided supportive counseling and offered to speak with FOB to assist with understanding  SABRINA WASHINGTON discussed follow up in two weeks and was agreeable  Commits to call SABRINA WASHINGTON or go to nearest ED if any signs worsen

## 2021-03-06 LAB
C TRACH DNA SPEC QL NAA+PROBE: NEGATIVE
N GONORRHOEA DNA SPEC QL NAA+PROBE: NEGATIVE

## 2021-03-12 LAB
LAB AP GYN PRIMARY INTERPRETATION: NORMAL
Lab: NORMAL

## 2021-03-20 ENCOUNTER — APPOINTMENT (OUTPATIENT)
Dept: LAB | Facility: HOSPITAL | Age: 26
End: 2021-03-20
Payer: COMMERCIAL

## 2021-03-20 DIAGNOSIS — Z3A.09 9 WEEKS GESTATION OF PREGNANCY: ICD-10-CM

## 2021-03-20 DIAGNOSIS — Z34.91 PREGNANT AND NOT YET DELIVERED IN FIRST TRIMESTER: ICD-10-CM

## 2021-03-20 LAB
ABO GROUP BLD: NORMAL
BACTERIA UR QL AUTO: ABNORMAL /HPF
BASOPHILS # BLD AUTO: 0.02 THOUSANDS/ΜL (ref 0–0.1)
BASOPHILS NFR BLD AUTO: 0 % (ref 0–1)
BILIRUB UR QL STRIP: NEGATIVE
BLD GP AB SCN SERPL QL: NEGATIVE
CLARITY UR: ABNORMAL
COLOR UR: YELLOW
EOSINOPHIL # BLD AUTO: 0.17 THOUSAND/ΜL (ref 0–0.61)
EOSINOPHIL NFR BLD AUTO: 2 % (ref 0–6)
ERYTHROCYTE [DISTWIDTH] IN BLOOD BY AUTOMATED COUNT: 13.3 % (ref 11.6–15.1)
GLUCOSE UR STRIP-MCNC: NEGATIVE MG/DL
HBV SURFACE AG SER QL: NORMAL
HCT VFR BLD AUTO: 37 % (ref 34.8–46.1)
HGB BLD-MCNC: 12.5 G/DL (ref 11.5–15.4)
HGB UR QL STRIP.AUTO: NEGATIVE
HYALINE CASTS #/AREA URNS LPF: ABNORMAL /LPF
IMM GRANULOCYTES # BLD AUTO: 0.06 THOUSAND/UL (ref 0–0.2)
IMM GRANULOCYTES NFR BLD AUTO: 1 % (ref 0–2)
KETONES UR STRIP-MCNC: NEGATIVE MG/DL
LEUKOCYTE ESTERASE UR QL STRIP: ABNORMAL
LYMPHOCYTES # BLD AUTO: 1.89 THOUSANDS/ΜL (ref 0.6–4.47)
LYMPHOCYTES NFR BLD AUTO: 18 % (ref 14–44)
MCH RBC QN AUTO: 29.5 PG (ref 26.8–34.3)
MCHC RBC AUTO-ENTMCNC: 33.8 G/DL (ref 31.4–37.4)
MCV RBC AUTO: 87 FL (ref 82–98)
MONOCYTES # BLD AUTO: 0.55 THOUSAND/ΜL (ref 0.17–1.22)
MONOCYTES NFR BLD AUTO: 5 % (ref 4–12)
NEUTROPHILS # BLD AUTO: 7.72 THOUSANDS/ΜL (ref 1.85–7.62)
NEUTS SEG NFR BLD AUTO: 74 % (ref 43–75)
NITRITE UR QL STRIP: NEGATIVE
NON-SQ EPI CELLS URNS QL MICRO: ABNORMAL /HPF
NRBC BLD AUTO-RTO: 0 /100 WBCS
PH UR STRIP.AUTO: 7 [PH]
PLATELET # BLD AUTO: 249 THOUSANDS/UL (ref 149–390)
PMV BLD AUTO: 11 FL (ref 8.9–12.7)
PROT UR STRIP-MCNC: NEGATIVE MG/DL
RBC # BLD AUTO: 4.24 MILLION/UL (ref 3.81–5.12)
RBC #/AREA URNS AUTO: ABNORMAL /HPF
RH BLD: POSITIVE
RUBV IGG SERPL IA-ACNC: >175 IU/ML
SP GR UR STRIP.AUTO: 1.02 (ref 1–1.03)
SPECIMEN EXPIRATION DATE: NORMAL
UROBILINOGEN UR QL STRIP.AUTO: 1 E.U./DL
WBC # BLD AUTO: 10.41 THOUSAND/UL (ref 4.31–10.16)
WBC #/AREA URNS AUTO: ABNORMAL /HPF

## 2021-03-20 PROCEDURE — 81001 URINALYSIS AUTO W/SCOPE: CPT

## 2021-03-20 PROCEDURE — 36415 COLL VENOUS BLD VENIPUNCTURE: CPT

## 2021-03-20 PROCEDURE — 80081 OBSTETRIC PANEL INC HIV TSTG: CPT

## 2021-03-20 PROCEDURE — 87086 URINE CULTURE/COLONY COUNT: CPT

## 2021-03-21 LAB — HIV 1+2 AB+HIV1 P24 AG SERPL QL IA: NORMAL

## 2021-03-22 LAB
BACTERIA UR CULT: NORMAL
RPR SER QL: NORMAL

## 2021-03-24 ENCOUNTER — ROUTINE PRENATAL (OUTPATIENT)
Dept: PERINATAL CARE | Facility: CLINIC | Age: 26
End: 2021-03-24
Payer: COMMERCIAL

## 2021-03-24 VITALS
SYSTOLIC BLOOD PRESSURE: 125 MMHG | BODY MASS INDEX: 33.95 KG/M2 | HEIGHT: 61 IN | DIASTOLIC BLOOD PRESSURE: 72 MMHG | WEIGHT: 179.8 LBS | HEART RATE: 92 BPM

## 2021-03-24 DIAGNOSIS — E66.9 OBESITY (BMI 30.0-34.9): ICD-10-CM

## 2021-03-24 DIAGNOSIS — Z34.91 PREGNANT AND NOT YET DELIVERED IN FIRST TRIMESTER: ICD-10-CM

## 2021-03-24 DIAGNOSIS — Z3A.13 13 WEEKS GESTATION OF PREGNANCY: ICD-10-CM

## 2021-03-24 DIAGNOSIS — Z13.79 GENETIC SCREENING: ICD-10-CM

## 2021-03-24 DIAGNOSIS — Z34.91 FIRST TRIMESTER PREGNANCY: Primary | ICD-10-CM

## 2021-03-24 DIAGNOSIS — Z36.82 NUCHAL TRANSLUCENCY OF FETUS ON PRENATAL ULTRASOUND: ICD-10-CM

## 2021-03-24 PROCEDURE — 76813 OB US NUCHAL MEAS 1 GEST: CPT | Performed by: OBSTETRICS & GYNECOLOGY

## 2021-03-24 PROCEDURE — 99203 OFFICE O/P NEW LOW 30 MIN: CPT | Performed by: OBSTETRICS & GYNECOLOGY

## 2021-03-24 NOTE — PROGRESS NOTES
[id number] lab ordered  Instructed patient on process for checking her OOP cost via [employer] [facility]  Provided [id number] instruction card toll free # [phone]  Patient made aware if [id number]  unable to give an estimate she will need to contact M office prior to blood draw  Patient aware that  is provided by third party and is only an estimate of cost not a guarantee  Insurance may require prior authorization, if test drawn without prior authorization she will be responsible for full cost of test   For definitive OOP cost, lab deductible or if lab authorization is required patient encouraged to call her insurance provider  Explained customer service insurance phone # located on the back of her ID card  Maternal Fetal Medicine will have results in approximately 7-10 business days and will call patient or notify via [facility]  Patient aware viewing lab result reveals gender  Patient verbalized understanding of all instructions and no questions at this time

## 2021-03-24 NOTE — LETTER
2021     Anna Stroud, [address] [facility] [address]    Patient: Chelly Godoy   YOB: 1995   Date of Visit: 3/24/2021       Dear Dr Nela Mayer: Thank you for referring Chelly Godoy to me for evaluation  Below are my notes for this consultation  If you have questions, please do not hesitate to call me  I look forward to following your patient along with you  Sincerely,        Irena Billingsley MD        CC: No Recipients  Irena Billingsley MD  3/30/2021  5:23 PM  Sign when Signing Visit  Ob ultrasound and MFM consultation    Ms Rachid Longo is a 29 yo   patient at 15 3/7 weeks gestation  An ultrasound for viability, dating and nuchal translucency was completed today  See Ob Procedures in EPIC  1  Live, muniz fetus with size = dates; SILVIA 2021  Normal nuchal translucency at the 51% for CRL  The Sequential Screen was discussed in detail, including the sensitivity for detection of Down syndrome estimated at 95%  Cell-free DNA (cfDNA)  (Non invasive prenatal testing)-NIPT) screening has a 99% detection rate for Down syndrome, 96% for trisomy 18, and 91% for trisomy 13 with <1% false positive rate  It may need to obtain approval from the insurance company  The patient received instructions on how to contact her insurance company and how to have a blood sample drawn at an outside laboratory  e patient  declined use of definitive prenatal diagnosis, which is possible only through genetic amniocentesis or CVS         Ob Hx:   2010:  at 9 weeks  D/C  2016: Vaginal delivery at term, live baby girl at 7 lb 14 oz   : current pregnancy  Medical hx:   Obesity  Surgical hx: Negative      Medications: PNV      Allergies: none      Family Hx: non contributory      Social Hx: No current tobacco alcohol or illicit drug use  Was using the hookah until diagnosed with this pregnancy   I congratulated Ms Hutton Megan in D/C use of the hookah  I reviewed the results of this ultrasound with Ms Surekha Brito and answered her questions  Obesity: BMI above 30 confers increased pregnancy risks  Maternal risks include preeclampsia, gestational diabetes, cardiac dysfunction, and sleep apnea  Fetal risks include miscarriage, fetal anomalies, and stillbirth as well as macrosomia and impaired growth  Intrapartum risks include  delivery, wound complications, and venous thrombosis  The  detection of congenital anomalies is reduced with increasing maternal BMI  Strategies to limit weight gain to the [place] of Medicine guidelines, which for BMI>30 is 11-20 pounds, include dietary control, exercise, and behavior modification  The [place] [address] and [facility] recommends 150 minutes per week of aerobic exercise in pregnancy, which could be divided into 30 minutes daily, five times per week  We discussed that tobacco/nicotine (Hookah) use during pregnancy is associated with an increased risk for adverse pregnancy outcomes, including  cleft lip and palate, abnormal development of the fetal brain,  delivery, fetal growth restriction, abruptio placentae, and stillbirth and in the  period, SIDS, otitis and obesity  Reviewed how use of nicotine replacement therapy has not been shown to be safe in pregnancy and it is not recommended  Encouraged her to continue D/C tobacco/nicotine use as she is at risk for recurrence postpartum  Recommendations:      1  MSAFP to screen for ONTD's after 16 and before 22 weeks to be ordered and followed from your office  2  Fetal Level II ultrasound imaging is scheduled at about 20 weeks gestation  3  I recommend a consultation with  Nutrition  4  I also advise early glucola screening and if normal repeat at 28 weeks  The total time spent on this new patient on the encounter date was 20 minutes    This time included previsit service time of 5 minutes, face-to-face  service time of 10 minutes discussing the results of the ultrasound, medical history, findings and recomendations, and post-service time of 5 minutes  Thank you for referring your patient to our offices  The limitations of ultrasound to detect all anomalies was reviewed and how it is not  a test to rule out aneuploidy  If you have any further questions do not hesitate to contact us as [phone]      Fred Polanco MD

## 2021-03-25 NOTE — PROGRESS NOTES
Kiarra Ham presents today for routine OB visit at 13w4d  KB=142/85  Wt=82 8 kg (182 lb 8 oz); Body mass index is 34 48 kg/m² ; TWG=Not found  Fetal Heart Rate: 150  Abdomen: soft, non-tender  Denies vaginal bleeding or leaking of fluid  Pap/GC/CT- negative   ultrasound done at 13 weeks and 3 days on 2021  Scheduled for ultrasound next on  2021  prenatal labs- O positive, negative antibody screen, HGB 12 5  [id number] lab ordered by M- pt reports is not going to get due to costs  QS reviewed and pt declines also  Pt is accompanied with her partner,  she was referred to  on 2021 for increased Flushing score of 21   Pt was prescribed Zoloft 25 mgs states she took few times but decided she does not want to take any medications  We discussed the delay in therapeutic effect, can be up to 4-6wks She also does not want to take her vitamin B6 or Unisom  She has ptyalism, and with masking this 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 emotionally  She does use hard candies, will also keep gauze in her mouth  She will vomit 4 x per week, seems to be decreasing  She is taking pepcid AC for heartburn  She will speak with Vincent Music today with her partner  Please see note from  today  Advised to continue medications and return in 4 weeks        Current Outpatient Medications   Medication Instructions    doxylamine (UNISON) 25 mg, Oral, Daily at bedtime PRN    Prenatal Multivit-Min-Fe-FA (PRE-BIMAL FORMULA PO) Oral    pyridoxine (B-6) 25 mg, Oral, Daily    sertraline (ZOLOFT) 25 mg, Oral, Daily         Pregnancy Problems (from 21 to present)     Problem Noted Resolved    10 weeks gestation of pregnancy 3/5/2021 by Giovana Castle, DO [place]

## 2021-03-26 ENCOUNTER — PATIENT OUTREACH (OUTPATIENT)
Dept: OBGYN CLINIC | Facility: CLINIC | Age: 26
End: 2021-03-26

## 2021-03-26 ENCOUNTER — ROUTINE PRENATAL (OUTPATIENT)
Dept: OBGYN CLINIC | Facility: CLINIC | Age: 26
End: 2021-03-26

## 2021-03-26 VITALS
DIASTOLIC BLOOD PRESSURE: 85 MMHG | HEIGHT: 61 IN | WEIGHT: 182.5 LBS | BODY MASS INDEX: 34.46 KG/M2 | SYSTOLIC BLOOD PRESSURE: 131 MMHG | HEART RATE: 104 BPM

## 2021-03-26 DIAGNOSIS — K11.7 PTYALISM: ICD-10-CM

## 2021-03-26 DIAGNOSIS — Z3A.13 13 WEEKS GESTATION OF PREGNANCY: Primary | ICD-10-CM

## 2021-03-26 DIAGNOSIS — Z34.91 FIRST TRIMESTER PREGNANCY: ICD-10-CM

## 2021-03-26 LAB
SL AMB  POCT GLUCOSE, UA: NEGATIVE
SL AMB LEUKOCYTE ESTERASE,UA: NEGATIVE
SL AMB POCT BILIRUBIN,UA: NEGATIVE
SL AMB POCT BLOOD,UA: NEGATIVE
SL AMB POCT KETONES,UA: NEGATIVE
SL AMB POCT NITRITE,UA: NEGATIVE
SL AMB POCT PH,UA: 6.5
SL AMB POCT SPECIFIC GRAVITY,UA: 1.02
SL AMB POCT URINE PROTEIN: 1
SL AMB POCT UROBILINOGEN: NEGATIVE

## 2021-03-26 PROCEDURE — 81002 URINALYSIS NONAUTO W/O SCOPE: CPT | Performed by: NURSE PRACTITIONER

## 2021-03-26 PROCEDURE — PNV: Performed by: NURSE PRACTITIONER

## 2021-03-26 NOTE — PROGRESS NOTES
SABRINA WASHINGTON met with Pt and FOB for follow up  Pt reported she is still dealing with sad feelings and lack of motivation  Pt denies SI / HI  Pt states she also overwhelmed with the constant spitting, has tried many things w/o success Pt reported she did tried the medication for few days but did not like the way she felt  SABRINA WASHINGTON provided supportive counseling  Discussed sharing daily chores, expressing their feelings and needs in a constructive way to avoid conflict  Pt and FOB verbalized understanding  Pt expressed dissatisfaction with last Provider because he did not sign the  FMLA papers  SABRINA WASHINGTON educated Pt regarding process and requirement to qualify to be out of work  Pt verbalized understanding  will follow up in 2 weeks to reassess situation and need to re start Zoloft  Pt in agreement  Will call SABRINA WASHINGTON at ant time needed  Pt denies other concern

## 2021-03-26 NOTE — PATIENT INSTRUCTIONS
Pregnancy at 15 to 18 Weeks   AMBULATORY CARE:   What changes are happening to your body:  Now that you are in your second trimester, you have more energy  You may also feel hungrier than usual  You may start to experience other symptoms, such as heartburn or dizziness  You may be gaining about ½ to 1 pound a week, and your pregnancy is beginning to show  You may need to start wearing maternity clothes  Seek care immediately if:   · You have pain or cramping in your abdomen or low back  · You have heavy vaginal bleeding or clotting  · You pass material that looks like tissue or large clots  Collect the material and bring it with you  Contact your healthcare provider if:   · You cannot keep food or drinks down, and you are losing weight  · You have light bleeding  · You have chills or a fever  · You have vaginal itching, burning, or pain  · You have yellow, green, white, or foul-smelling vaginal discharge  · You have pain or burning when you urinate, less urine than usual, or pink or bloody urine  · You have questions or concerns about your condition or care  How to care for yourself at this stage of your pregnancy:   · Manage heartburn  by eating 4 or 5 small meals each day instead of large meals  Avoid spicy foods  Avoid eating right before bedtime  · Manage nausea and vomiting  Avoid fatty and spicy foods  Eat small meals throughout the day instead of large meals  Reshma may help to decrease nausea  Ask your healthcare provider about other ways of decreasing nausea and vomiting  · Eat a variety of healthy foods  Healthy foods include fruits, vegetables, whole-grain breads, low-fat dairy foods, beans, lean meats, and fish  Drink liquids as directed  Ask how much liquid to drink each day and which liquids are best for you  Limit caffeine to less than 200 milligrams each day  Limit your intake of fish to 2 servings each week   Choose fish low in mercury such as canned light tuna, shrimp, salmon, cod, or tilapia  Do not  eat fish high in mercury such as swordfish, tilefish, val mackerel, and shark  · Take prenatal vitamins as directed  Your need for certain vitamins and minerals, such as folic acid, increases during pregnancy  Prenatal vitamins provide some of the extra vitamins and minerals you need  Prenatal vitamins may also help to decrease the risk of certain birth defects  · Do not smoke  Smoking increases your risk of a miscarriage and other health problems during your pregnancy  Smoking can cause your baby to be born too early or weigh less at birth  Ask your healthcare provider for information if you need help quitting  · Do not drink alcohol  Alcohol passes from your body to your baby through the placenta  It can affect your baby's brain development and cause fetal alcohol syndrome (FAS)  FAS is a group of conditions that causes mental, behavior, and growth problems  · Talk to your healthcare provider before you take any medicines  Many medicines may harm your baby if you take them when you are pregnant  Do not take any medicines, vitamins, herbs, or supplements without first talking to your healthcare provider  Never use illegal or street drugs (such as marijuana or cocaine) while you are pregnant  Safety tips during pregnancy:   · Avoid hot tubs and saunas  Do not use a hot tub or sauna while you are pregnant, especially during your first trimester  Hot tubs and saunas may raise your baby's temperature and increase the risk of birth defects  · Avoid toxoplasmosis  This is an infection caused by eating raw meat or being around infected cat feces  It can cause birth defects, miscarriages, and other problems  Wash your hands after you touch raw meat  Make sure any meat is well-cooked before you eat it  Avoid raw eggs and unpasteurized milk  Use gloves or ask someone else to clean your cat's litter box while you are pregnant      Changes that are happening with your baby:  By 18 weeks, your baby may be about 6 inches long from the top of the head to the rump (baby's bottom)  Your baby may weigh about 11 ounces  You may be able to feel your baby's movement at about 18 weeks or later  The first movements may not be that noticeable  They may feel like a fluttering sensation  Your baby also makes sucking movements and can hear certain sounds  What you need to know about prenatal care:  During the first 28 weeks of your pregnancy, you will see your healthcare provider once a month  Your healthcare provider will check your blood pressure and weight  You may also need any of the following:  · A urine test  may also be done to check for sugar and protein  These can be signs of gestational diabetes or infection  · A blood test  may be done to check for anemia (low iron level)  · Fundal height check  is a measurement of your uterus to check your baby's growth  This number is usually the same as the number of weeks that you have been pregnant  · An ultrasound  may be done to check your baby's development  Your healthcare provider may be able to tell you what your baby's gender is during the ultrasound  · Your baby's heart rate  will be checked  © Copyright [address] Information is for End User's use only and may not be sold, redistributed or otherwise used for commercial purposes  All illustrations and images included in CareNotes® are the copyrighted property of [employer]  or [facility] Valeri Wills   The above information is an  only  It is not intended as medical advice for individual conditions or treatments  Talk to your doctor, nurse or pharmacist before following any medical regimen to see if it is safe and effective for you  Pregnancy at 11 to 14 Weeks   AMBULATORY CARE:   Changes happening to your body: You are now at the end of your first trimester and entering your second trimester   Morning sickness usually goes away by this time  You may have other symptoms such as fatigue, frequent urination, and headaches  You may have gained 2 to 4 pounds by now  Seek care immediately if:   · You have pain or cramping in your abdomen or low back  · You have heavy vaginal bleeding or clotting  · You pass material that looks like tissue or large clots  Collect the material and bring it with you  Call your doctor or obstetrician if:   · You cannot keep food or drinks down, and you are losing weight  · You have light vaginal bleeding  · You have chills or a fever  · You have vaginal itching, burning, or pain  · You have yellow, green, white, or foul-smelling vaginal discharge  · You have pain or burning when you urinate, less urine than usual, or pink or bloody urine  · You have questions or concerns about your condition or care  How to care for yourself at this stage of your pregnancy:   · Get plenty of rest   You may feel more tired than normal  You may need to take naps or go to bed earlier  · Manage nausea and vomiting  Avoid fatty and spicy foods  Eat small meals throughout the day instead of large meals  Reshma may help to decrease nausea  Ask your healthcare provider about other ways of decreasing nausea and vomiting  · Eat a variety of healthy foods  Healthy foods include fruits, vegetables, whole-grain breads, low-fat dairy foods, beans, lean meats, and fish  Drink liquids as directed  Ask how much liquid to drink each day and which liquids are best for you  Limit caffeine to less than 200 milligrams each day  Limit your intake of fish to 2 servings each week  Choose fish low in mercury such as canned light tuna, shrimp, salmon, cod, or tilapia  Do not  eat fish high in mercury such as swordfish, tilefish, val mackerel, and shark  · Take prenatal vitamins as directed  Your need for certain vitamins and minerals, such as folic acid, increases during pregnancy   Prenatal vitamins provide some of the extra vitamins and minerals you need  Prenatal vitamins may also help to decrease the risk of certain birth defects  · Do not smoke  Smoking increases your risk of a miscarriage and other health problems during your pregnancy  Smoking can cause your baby to be born too early or weigh less at birth  Ask your healthcare provider for information if you need help quitting  · Do not drink alcohol  Alcohol passes from your body to your baby through the placenta  It can affect your baby's brain development and cause fetal alcohol syndrome (FAS)  FAS is a group of conditions that causes mental, behavior, and growth problems  · Talk to your healthcare provider before you take any medicines  Many medicines may harm your baby if you take them when you are pregnant  Do not take any medicines, vitamins, herbs, or supplements without first talking to your healthcare provider  Never use illegal or street drugs (such as marijuana or cocaine) while you are pregnant  Safety tips during pregnancy:   · Avoid hot tubs and saunas  Do not use a hot tub or sauna while you are pregnant, especially during your first trimester  Hot tubs and saunas may raise your baby's temperature and increase the risk of birth defects  · Avoid toxoplasmosis  This is an infection caused by eating raw meat or being around infected cat feces  It can cause birth defects, miscarriages, and other problems  Wash your hands after you touch raw meat  Make sure any meat is well-cooked before you eat it  Avoid raw eggs and unpasteurized milk  Use gloves or ask someone else to clean your cat's litter box while you are pregnant  Changes happening with your baby: Your baby has fully formed fingernails and toenails  Your baby's heartbeat can now be heard  Ask your healthcare provider if you can listen to your baby's heartbeat  By week 14, your baby is over 4 inches long from the top of the head to the rump (baby's bottom)   Your baby weighs over 3 ounces  Prenatal care:  Prenatal care is a series of visits with your healthcare provider throughout your pregnancy  During the first 28 weeks of your pregnancy, you will see your healthcare provider 1 time each month  Prenatal care can help prevent problems during pregnancy and childbirth  Your healthcare provider will check your blood pressure and weight  Your baby's heart rate will also be checked  You may also need the following at some visits:  · A pelvic exam  allows your healthcare provider to see your cervix (the bottom part of your uterus)  Your healthcare provider will use a speculum to open your vagina  He or she will check the size and shape of your uterus  · Blood tests  may be done to check for any of the following:     ? Gestational diabetes or anemia (low iron level)    ? Blood type or Rh factor, or certain birth defects    ? Immunity to certain diseases, such as chickenpox or rubella    ? An infection, such as a sexually transmitted infection, HIV, or hepatitis B    · Hepatitis B  may need to be prevented or treated  Hepatitis B is inflammation of the liver caused by the hepatitis B virus (HBV)  HBV can spread from a mother to her baby during delivery  You will be checked for HBV as early as possible in the first trimester of each pregnancy  You need the test even if you received the hepatitis B vaccine or were tested before  You may need to have an HBV infection treated before you give birth  · Urine tests  may also be done to check for sugar and protein  These can be signs of gestational diabetes or preeclampsia  Urine tests may also be done to check for signs of infection  · A fetal ultrasound  shows pictures of your baby inside your uterus  The pictures are used to check your baby's development, movement, and position  · Genetic disorder screening tests  may be offered to you  These tests check your baby's risk for genetic disorders such as Down syndrome   A screening test includes a blood test and ultrasound  Follow up with your doctor or obstetrician as directed:  Go to all prenatal visits  Write down your questions so you remember to ask them during your visits  © Copyright [address] Information is for End User's use only and may not be sold, redistributed or otherwise used for commercial purposes  All illustrations and images included in CareNotes® are the copyrighted property of [employer]  or [facility] Vlaeri Wills   The above information is an  only  It is not intended as medical advice for individual conditions or treatments  Talk to your doctor, nurse or pharmacist before following any medical regimen to see if it is safe and effective for you

## 2021-03-30 PROBLEM — Z30.011 ENCOUNTER FOR INITIAL PRESCRIPTION OF CONTRACEPTIVE PILLS: Status: RESOLVED | Noted: 2020-05-14 | Resolved: 2021-03-30

## 2021-03-30 PROBLEM — E66.9 OBESITY (BMI 30.0-34.9): Status: ACTIVE | Noted: 2021-03-30

## 2021-03-30 PROBLEM — Z36.82 NUCHAL TRANSLUCENCY OF FETUS ON PRENATAL ULTRASOUND: Status: ACTIVE | Noted: 2021-03-30

## 2021-03-30 PROBLEM — E66.811 OBESITY (BMI 30.0-34.9): Status: ACTIVE | Noted: 2021-03-30

## 2021-03-30 PROBLEM — Z13.79 GENETIC SCREENING: Status: ACTIVE | Noted: 2021-03-30

## 2021-03-30 NOTE — PROGRESS NOTES
Ob ultrasound and MFM consultation    Ms Mara Cox is a 29 yo   patient at 15 3/7 weeks gestation  An ultrasound for viability, dating and nuchal translucency was completed today  See Ob Procedures in EPIC  1  Live, muniz fetus with size = dates; SILVIA 2021  Normal nuchal translucency at the 51% for CRL  The Sequential Screen was discussed in detail, including the sensitivity for detection of Down syndrome estimated at 95%  Cell-free DNA (cfDNA)  (Non invasive prenatal testing)-NIPT) screening has a 99% detection rate for Down syndrome, 96% for trisomy 18, and 91% for trisomy 13 with <1% false positive rate  It may need to obtain approval from the insurance company  The patient received instructions on how to contact her insurance company and how to have a blood sample drawn at an outside laboratory  e patient  declined use of definitive prenatal diagnosis, which is possible only through genetic amniocentesis or [facility]         Ob Hx:   :  at 9 weeks  D/C  2016: Vaginal delivery at term, live baby girl at 7 lb 14 oz   : current pregnancy  Medical hx:   Obesity  Surgical hx: Negative      Medications: PNV      Allergies: none      Family Hx: non contributory      Social Hx: No current tobacco alcohol or illicit drug use  Was using the hookah until diagnosed with this pregnancy  I congratulated Ms Pleitez  in D/C use of the hookah  I reviewed the results of this ultrasound with Ms Jungjf Goodson and answered her questions  Obesity: BMI above 30 confers increased pregnancy risks  Maternal risks include preeclampsia, gestational diabetes, cardiac dysfunction, and sleep apnea  Fetal risks include miscarriage, fetal anomalies, and stillbirth as well as macrosomia and impaired growth  Intrapartum risks include  delivery, wound complications, and venous thrombosis   The  detection of congenital anomalies is reduced with increasing maternal BMI  Strategies to limit weight gain to the [place] of Medicine guidelines, which for BMI>30 is 11-20 pounds, include dietary control, exercise, and behavior modification  The [place] [address] and Human Services recommends 150 minutes per week of aerobic exercise in pregnancy, which could be divided into 30 minutes daily, five times per week  We discussed that tobacco/nicotine (Hookah) use during pregnancy is associated with an increased risk for adverse pregnancy outcomes, including  cleft lip and palate, abnormal development of the fetal brain,  delivery, fetal growth restriction, abruptio placentae, and stillbirth and in the  period, SIDS, otitis and obesity  Reviewed how use of nicotine replacement therapy has not been shown to be safe in pregnancy and it is not recommended  Encouraged her to continue D/C tobacco/nicotine use as she is at risk for recurrence postpartum  Recommendations:      1  MSAFP to screen for ONTD's after 16 and before 22 weeks to be ordered and followed from your office  2  Fetal Level II ultrasound imaging is scheduled at about 20 weeks gestation  3  I recommend a consultation with  Nutrition  4  I also advise early glucola screening and if normal repeat at 28 weeks  The total time spent on this new patient on the encounter date was 20 minutes  This time included previsit service time of 5 minutes, face-to-face  service time of 10 minutes discussing the results of the ultrasound, medical history, findings and recomendations, and post-service time of 5 minutes  Thank you for referring your patient to our offices  The limitations of ultrasound to detect all anomalies was reviewed and how it is not  a test to rule out aneuploidy  If you have any further questions do not hesitate to contact us as [phone]      Tam Castle MD

## 2021-03-31 ENCOUNTER — TELEPHONE (OUTPATIENT)
Dept: POSTPARTUM | Facility: CLINIC | Age: 26
End: 2021-03-31

## 2021-03-31 NOTE — TELEPHONE ENCOUNTER
Spoke with Josafat Durand - she is currently 14wks pregnant & is asking if it is ok for her to receive the Covid shot - When asked if she spoke with her OB - said yes & they transferred her here  Her OB is [facility]

## 2021-04-01 NOTE — TELEPHONE ENCOUNTER
I spoke with Diamond  She is considering getting a Covid-19 vaccine while she is pregnant  She has questions and was referred to us by her OB  I encouraged Diamond to speak with her OB provider again about her questions  I gave her general information about the vaccine but explained she needs the input of her care provider in order to make a decision

## 2021-04-13 ENCOUNTER — SOCIAL WORK (OUTPATIENT)
Dept: BEHAVIORAL/MENTAL HEALTH CLINIC | Facility: CLINIC | Age: 26
End: 2021-04-13
Payer: COMMERCIAL

## 2021-04-13 DIAGNOSIS — F41.0 PANIC ATTACKS: ICD-10-CM

## 2021-04-13 DIAGNOSIS — O99.341 DEPRESSION DURING PREGNANCY IN FIRST TRIMESTER: Primary | ICD-10-CM

## 2021-04-13 DIAGNOSIS — F32.A DEPRESSION DURING PREGNANCY IN FIRST TRIMESTER: Primary | ICD-10-CM

## 2021-04-13 PROCEDURE — 90834 PSYTX W PT 45 MINUTES: CPT | Performed by: SOCIAL WORKER
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
## 2021-04-23 ENCOUNTER — PATIENT OUTREACH (OUTPATIENT)
Dept: OBGYN CLINIC | Facility: CLINIC | Age: 26
End: 2021-04-23

## 2021-04-30 ENCOUNTER — ROUTINE PRENATAL (OUTPATIENT)
Dept: OBGYN CLINIC | Facility: CLINIC | Age: 26
End: 2021-04-30

## 2021-04-30 VITALS
WEIGHT: 180 LBS | SYSTOLIC BLOOD PRESSURE: 122 MMHG | HEART RATE: 93 BPM | BODY MASS INDEX: 33.99 KG/M2 | DIASTOLIC BLOOD PRESSURE: 74 MMHG | HEIGHT: 61 IN

## 2021-04-30 DIAGNOSIS — F32.A DEPRESSION DURING PREGNANCY IN FIRST TRIMESTER: ICD-10-CM

## 2021-04-30 DIAGNOSIS — Z3A.18 18 WEEKS GESTATION OF PREGNANCY: Primary | ICD-10-CM

## 2021-04-30 DIAGNOSIS — O99.341 DEPRESSION DURING PREGNANCY IN FIRST TRIMESTER: ICD-10-CM

## 2021-04-30 LAB
SL AMB  POCT GLUCOSE, UA: NEGATIVE
SL AMB LEUKOCYTE ESTERASE,UA: NEGATIVE
SL AMB POCT BILIRUBIN,UA: NEGATIVE
SL AMB POCT BLOOD,UA: NEGATIVE
SL AMB POCT KETONES,UA: NEGATIVE
SL AMB POCT NITRITE,UA: NEGATIVE
SL AMB POCT PH,UA: 5
SL AMB POCT SPECIFIC GRAVITY,UA: 1.03
SL AMB POCT URINE PROTEIN: 1
SL AMB POCT UROBILINOGEN: NEGATIVE

## 2021-04-30 PROCEDURE — 81002 URINALYSIS NONAUTO W/O SCOPE: CPT | Performed by: OBSTETRICS & GYNECOLOGY

## 2021-04-30 PROCEDURE — PNV: Performed by: OBSTETRICS & GYNECOLOGY

## 2021-04-30 NOTE — PROGRESS NOTES
OB/GYN prenatal visit    S: 30 y o  Irma Mcdermott 18w5d here for PN visit  She has no obstetric complaints, including pelvic pain, contractions, vaginal bleeding, loss of fluid, or decreased fetal movement  She is having some nausea and vomiting a few days per week, but declines treatment  Patient has had some depression during pregnancy, but it is improved with Zoloft usage  O:  Vitals:    21 0844   BP: 122/74   Pulse: 93       Gen: no acute distress, nonlabored breathing  Fundal Height (cm): 23 cm  Fetal Heart Rate: 155    A/P:      IUP at 18w5d  Problem List Items Addressed This Visit        Other    13 weeks gestation of pregnancy - Primary     She is having some morning nausea   PNC US 21  TWG: +2  (recommended weight gain 8-11)  Contraception: Micronor  Breastfeeding: yes  Birth plan:  no epidural  Discussed  labor precautions and fetal kick counts    Return to clinic in 4 weeks  COVID Vaccine: she has not yet received it yet, but wants to get it - will try to schedule it on MyChart               Depression during pregnancy in first trimester     Taking Zoloft                  Jennifer Abebe MD  2021  9:53 AM

## 2021-04-30 NOTE — ASSESSMENT & PLAN NOTE
She is having some morning nausea   [facility]  TWG: +2  (recommended weight gain 8-11)  Contraception: Micronor  Breastfeeding: yes  Birth plan:  no epidural  Discussed  labor precautions and fetal kick counts    Return to clinic in 4 weeks  COVID Vaccine: she has not yet received it yet, but wants to get it - will try to schedule it on [facility]

## 2021-05-10 ENCOUNTER — TELEPHONE (OUTPATIENT)
Dept: OBGYN CLINIC | Facility: CLINIC | Age: 26
End: 2021-05-10

## 2021-05-13 ENCOUNTER — ROUTINE PRENATAL (OUTPATIENT)
Dept: PERINATAL CARE | Facility: CLINIC | Age: 26
End: 2021-05-13
Payer: COMMERCIAL

## 2021-05-13 VITALS
HEIGHT: 61 IN | WEIGHT: 183.6 LBS | HEART RATE: 99 BPM | BODY MASS INDEX: 34.66 KG/M2 | DIASTOLIC BLOOD PRESSURE: 69 MMHG | SYSTOLIC BLOOD PRESSURE: 122 MMHG

## 2021-05-13 DIAGNOSIS — Z3A.20 20 WEEKS GESTATION OF PREGNANCY: Primary | ICD-10-CM

## 2021-05-13 DIAGNOSIS — E66.9 OBESITY (BMI 30.0-34.9): ICD-10-CM

## 2021-05-13 PROCEDURE — 76817 TRANSVAGINAL US OBSTETRIC: CPT | Performed by: OBSTETRICS & GYNECOLOGY

## 2021-05-13 PROCEDURE — 76811 OB US DETAILED SNGL FETUS: CPT | Performed by: OBSTETRICS & GYNECOLOGY

## 2021-05-13 PROCEDURE — 99213 OFFICE O/P EST LOW 20 MIN: CPT | Performed by: OBSTETRICS & GYNECOLOGY

## 2021-05-13 NOTE — PROGRESS NOTES
Ultrasound Probe Disinfection    A transvaginal ultrasound was performed  Prior to use, disinfection was performed with High Level Disinfection Process (Trophon)  Probe serial number B1: [id number] was used        Sarah Bose  05/13/21  11:10 AM

## 2021-05-13 NOTE — LETTER
May 17, 2021     Luis Miguel Mera, [address] [address]    Patient: Tsering Tenorio   YOB: 1995   Date of Visit: 2021       Dear Dr Dilma German: Thank you for referring Tsering Tenorio to me for evaluation  Below are my notes for this consultation  If you have questions, please do not hesitate to call me  I look forward to following your patient along with you  Sincerely,        Presley Mesa MD        CC: No Recipients  Presley Mesa MD  2021  5:42 PM  Sign when Signing Visit  A fetal ultrasound was completed  See Ob procedures in Epic for an interpretation and recommendations  Do not hesitate to contact us in [facility] if you have questions  Sanda Apgar Jerolyn Le MD, [address] [place] [address]  Maternal Fetal Medicine

## 2021-05-17 PROBLEM — Z3A.20 20 WEEKS GESTATION OF PREGNANCY: Status: ACTIVE | Noted: 2021-03-05

## 2021-05-17 NOTE — PROGRESS NOTES
A fetal ultrasound was completed  See Ob procedures in Epic for an interpretation and recommendations  Do not hesitate to contact us in [facility] if you have questions  Trino Esparza MD, [address] [facility]  Maternal Fetal Medicine

## 2021-06-08 ENCOUNTER — ROUTINE PRENATAL (OUTPATIENT)
Dept: OBGYN CLINIC | Facility: CLINIC | Age: 26
End: 2021-06-08

## 2021-06-08 VITALS
HEIGHT: 61 IN | SYSTOLIC BLOOD PRESSURE: 112 MMHG | BODY MASS INDEX: 35.12 KG/M2 | HEART RATE: 94 BPM | WEIGHT: 186 LBS | DIASTOLIC BLOOD PRESSURE: 64 MMHG

## 2021-06-08 DIAGNOSIS — Z3A.28 28 WEEKS GESTATION OF PREGNANCY: Primary | ICD-10-CM

## 2021-06-08 PROBLEM — Z30.9 CONTRACEPTION MANAGEMENT: Status: ACTIVE | Noted: 2021-06-08

## 2021-06-08 PROCEDURE — PNV: Performed by: OBSTETRICS & GYNECOLOGY

## 2021-06-08 NOTE — PATIENT INSTRUCTIONS
Pregnancy at 23 to 26 Weeks   AMBULATORY CARE:   What changes are happening to your body: You are now close to or at the beginning of the third trimester  The third trimester starts at 24 weeks and ends with delivery  As your baby gets larger, you may develop certain symptoms  These may include pain in your back or down the sides of your abdomen  You may also have stretch marks on your abdomen, breasts, thighs, or buttocks  You may also have constipation  Seek care immediately if:   · You develop a severe headache that does not go away  · You have new or increased vision changes, such as blurred or spotted vision  · You have new or increased swelling in your face or hands  · You have vaginal spotting or bleeding  · Your water broke or you feel warm water gushing or trickling from your vagina  Contact your healthcare provider if:   · You have abdominal cramps, pressure, or tightening  · You have a change in vaginal discharge  · You have light bleeding  · You have chills or a fever  · You have vaginal itching, burning, or pain  · You have yellow, green, white, or foul-smelling vaginal discharge  · You have pain or burning when you urinate, less urine than usual, or pink or bloody urine  · You have questions or concerns about your condition or care  How to care for yourself at this stage of your pregnancy:   · Eat a variety of healthy foods  Healthy foods include fruits, vegetables, whole-grain breads, low-fat dairy foods, beans, lean meats, and fish  Drink liquids as directed  Ask how much liquid to drink each day and which liquids are best for you  Limit caffeine to less than 200 milligrams each day  Limit your intake of fish to 2 servings each week  Choose fish low in mercury such as canned light tuna, shrimp, salmon, cod, or tilapia  Do not  eat fish high in mercury such as swordfish, tilefish, val mackerel, and shark  · Manage back pain    Do not stand for long periods of time or lift heavy items  Use good posture while you stand, squat, or bend  Wear low-heeled shoes with good support  Rest may also help to relieve back pain  Ask your healthcare provider about exercises you can do to strengthen your back muscles  · Take prenatal vitamins as directed  Your need for certain vitamins and minerals, such as folic acid, increases during pregnancy  Prenatal vitamins provide some of the extra vitamins and minerals you need  Prenatal vitamins may also help to decrease the risk of certain birth defects  · Talk to your healthcare provider about exercise  Moderate exercise can help you stay fit  Your healthcare provider will help you plan an exercise program that is safe for you during pregnancy  · Do not smoke  Smoking increases your risk of a miscarriage and other health problems during your pregnancy  Smoking can cause your baby to be born too early or weigh less at birth  Ask your healthcare provider for information if you need help quitting  · Do not drink alcohol  Alcohol passes from your body to your baby through the placenta  It can affect your baby's brain development and cause fetal alcohol syndrome (FAS)  FAS is a group of conditions that causes mental, behavior, and growth problems  · Talk to your healthcare provider before you take any medicines  Many medicines may harm your baby if you take them when you are pregnant  Do not take any medicines, vitamins, herbs, or supplements without first talking to your healthcare provider  Never use illegal or street drugs (such as marijuana or cocaine) while you are pregnant  Safety tips during pregnancy:   · Avoid hot tubs and saunas  Do not use a hot tub or sauna while you are pregnant, especially during your first trimester  Hot tubs and saunas may raise your baby's temperature and increase the risk of birth defects  · Avoid toxoplasmosis    This is an infection caused by eating raw meat or being around infected cat feces  It can cause birth defects, miscarriages, and other problems  Wash your hands after you touch raw meat  Make sure any meat is well-cooked before you eat it  Avoid raw eggs and unpasteurized milk  Use gloves or ask someone else to clean your cat's litter box while you are pregnant  Changes that are happening with your baby:  By 26 weeks, your baby will weigh about 2 pounds  Your baby will be about 10 inches long from the top of the head to the rump (baby's bottom)  Your baby's movements are much stronger now  Your baby's eyes are almost completely formed and can partially open  Your baby also sleeps and wakes up  What you need to know about prenatal care: Your healthcare provider will check your blood pressure and weight  You may also need the following:  · A urine test  may also be done to check for sugar and protein  These can be signs of gestational diabetes or infection  Protein in your urine may also be a sign of preeclampsia  Preeclampsia is a condition that can develop during week 20 or later of your pregnancy  It causes high blood pressure, and it can cause problems with your kidneys and other organs  · Fundal height  is a measurement of your uterus to check your baby's growth  This number is usually the same as the number of weeks that you have been pregnant  · Your baby's heart rate  will be checked  © Copyright [address] Information is for End User's use only and may not be sold, redistributed or otherwise used for commercial purposes  All illustrations and images included in CareNotes® are the copyrighted property of A D A M , Inc  or [facility]ills   The above information is an  only  It is not intended as medical advice for individual conditions or treatments  Talk to your doctor, nurse or pharmacist before following any medical regimen to see if it is safe and effective for you

## 2021-06-08 NOTE — PROGRESS NOTES
OB/GYN prenatal visit    S: 27 y o   24w2d here for PN visit  She denies obstetric complaints, including pelvic pain, contractions, vaginal bleeding, loss of fluid, or decreased fetal movement  Reports right hand/arm tingling, worse at end of day  Does use a computer at work  Recommended using wrist brace; if ineffective, should order EMG  O:  Vitals:    21 0900   BP: 112/64   Pulse: 94       Gen: no acute distress, nonlabored breathing  Fundal Height (cm): 24 cm  Fetal Heart Rate: 145    A/P:    IUP at 24w2d  28 week lab slip given to patient   No obstetric complaints today  [facility]  Breech, post fundal placenta, EFW 422g, LVP 5 5cm  Contraception: Micronor  Breastfeeding: yes  Birth plan: no epidural  Discussed  labor precautions and fetal kick counts    Return to clinic in 4 weeks    Depression during pregnancy  No longer taking Zoloft (stopped 3 weeks ago), no suicidal or homicidal ideations, neg screening today    COVID vaccine: Would like to get  Discussed how to use MyChart to schedule appointment       COVID 19 precautions were discussed with patient at length, reviewed symptoms, hygiene, social distancing, patient to call office  with questions/concerns      Stephy Mcknight MD  2021  9:48 AM

## 2021-06-18 ENCOUNTER — ULTRASOUND (OUTPATIENT)
Dept: PERINATAL CARE | Facility: OTHER | Age: 26
End: 2021-06-18
Payer: COMMERCIAL

## 2021-06-18 VITALS
SYSTOLIC BLOOD PRESSURE: 112 MMHG | WEIGHT: 186 LBS | HEART RATE: 100 BPM | HEIGHT: 61 IN | DIASTOLIC BLOOD PRESSURE: 75 MMHG | BODY MASS INDEX: 35.12 KG/M2

## 2021-06-18 DIAGNOSIS — Z3A.25 25 WEEKS GESTATION OF PREGNANCY: Primary | ICD-10-CM

## 2021-06-18 DIAGNOSIS — Z71.85 VACCINE COUNSELING: ICD-10-CM

## 2021-06-18 DIAGNOSIS — E66.9 OBESITY (BMI 30.0-34.9): ICD-10-CM

## 2021-06-18 PROCEDURE — 76816 OB US FOLLOW-UP PER FETUS: CPT | Performed by: OBSTETRICS & GYNECOLOGY

## 2021-06-18 PROCEDURE — 99213 OFFICE O/P EST LOW 20 MIN: CPT | Performed by: OBSTETRICS & GYNECOLOGY

## 2021-06-18 NOTE — LETTER
June 27, 2021     Malvin Andujar MD  [address] [address]    Patient: Caesar Conrelius   YOB: 1995   Date of Visit: 6/18/2021       Dear Dr Blue Hernandez: Thank you for referring Caesar Cornelius to me for evaluation  Below are my notes for this consultation  If you have questions, please do not hesitate to call me  I look forward to following your patient along with you  Sincerely,        Ritika Hartman MD        CC: No Recipients  Ritika Hartman MD  6/27/2021 11:42 AM  Sign when Signing Visit  A fetal ultrasound was completed  See Ob procedures in Epic for an interpretation and recommendations  Do not hesitate to contact us in [facility] if you have questions  Usha Broussard MD, [facility]  Maternal Fetal Medicine

## 2021-06-27 PROBLEM — Z3A.25 25 WEEKS GESTATION OF PREGNANCY: Status: ACTIVE | Noted: 2021-03-05

## 2021-06-27 PROBLEM — Z71.85 VACCINE COUNSELING: Status: ACTIVE | Noted: 2021-06-27

## 2021-06-27 NOTE — PROGRESS NOTES
A fetal ultrasound was completed  See Ob procedures in Epic for an interpretation and recommendations  Do not hesitate to contact us in [facility] if you have questions  Jamarcus Ortega MD, [address] [facility]  Maternal Fetal Medicine

## 2021-06-29 ENCOUNTER — APPOINTMENT (OUTPATIENT)
Dept: LAB | Facility: HOSPITAL | Age: 26
End: 2021-06-29
Payer: COMMERCIAL

## 2021-06-29 DIAGNOSIS — Z3A.28 28 WEEKS GESTATION OF PREGNANCY: ICD-10-CM

## 2021-06-29 LAB
BASOPHILS # BLD AUTO: 0.01 THOUSANDS/ΜL (ref 0–0.1)
BASOPHILS NFR BLD AUTO: 0 % (ref 0–1)
EOSINOPHIL # BLD AUTO: 0.14 THOUSAND/ΜL (ref 0–0.61)
EOSINOPHIL NFR BLD AUTO: 2 % (ref 0–6)
ERYTHROCYTE [DISTWIDTH] IN BLOOD BY AUTOMATED COUNT: 13.2 % (ref 11.6–15.1)
GLUCOSE 1H P 50 G GLC PO SERPL-MCNC: 207 MG/DL (ref 40–134)
HCT VFR BLD AUTO: 33.8 % (ref 34.8–46.1)
HGB BLD-MCNC: 11.3 G/DL (ref 11.5–15.4)
IMM GRANULOCYTES # BLD AUTO: 0.04 THOUSAND/UL (ref 0–0.2)
IMM GRANULOCYTES NFR BLD AUTO: 0 % (ref 0–2)
LYMPHOCYTES # BLD AUTO: 1.39 THOUSANDS/ΜL (ref 0.6–4.47)
LYMPHOCYTES NFR BLD AUTO: 15 % (ref 14–44)
MCH RBC QN AUTO: 29.3 PG (ref 26.8–34.3)
MCHC RBC AUTO-ENTMCNC: 33.4 G/DL (ref 31.4–37.4)
MCV RBC AUTO: 88 FL (ref 82–98)
MONOCYTES # BLD AUTO: 0.41 THOUSAND/ΜL (ref 0.17–1.22)
MONOCYTES NFR BLD AUTO: 4 % (ref 4–12)
NEUTROPHILS # BLD AUTO: 7.25 THOUSANDS/ΜL (ref 1.85–7.62)
NEUTS SEG NFR BLD AUTO: 79 % (ref 43–75)
NRBC BLD AUTO-RTO: 0 /100 WBCS
PLATELET # BLD AUTO: 223 THOUSANDS/UL (ref 149–390)
PMV BLD AUTO: 11.1 FL (ref 8.9–12.7)
RBC # BLD AUTO: 3.86 MILLION/UL (ref 3.81–5.12)
WBC # BLD AUTO: 9.24 THOUSAND/UL (ref 4.31–10.16)

## 2021-06-29 PROCEDURE — 85025 COMPLETE CBC W/AUTO DIFF WBC: CPT

## 2021-06-29 PROCEDURE — 82950 GLUCOSE TEST: CPT

## 2021-06-29 PROCEDURE — 36415 COLL VENOUS BLD VENIPUNCTURE: CPT

## 2021-06-29 PROCEDURE — 86592 SYPHILIS TEST NON-TREP QUAL: CPT

## 2021-06-30 LAB — RPR SER QL: NORMAL

## 2021-07-01 ENCOUNTER — ROUTINE PRENATAL (OUTPATIENT)
Dept: OBGYN CLINIC | Facility: CLINIC | Age: 26
End: 2021-07-01

## 2021-07-01 VITALS
SYSTOLIC BLOOD PRESSURE: 113 MMHG | HEIGHT: 61 IN | DIASTOLIC BLOOD PRESSURE: 70 MMHG | BODY MASS INDEX: 35.12 KG/M2 | HEART RATE: 88 BPM | WEIGHT: 186 LBS

## 2021-07-01 DIAGNOSIS — Z34.92 PRENATAL CARE IN SECOND TRIMESTER: Primary | ICD-10-CM

## 2021-07-01 DIAGNOSIS — E66.9 OBESITY (BMI 30.0-34.9): ICD-10-CM

## 2021-07-01 DIAGNOSIS — O24.419 GESTATIONAL DIABETES MELLITUS (GDM) IN SECOND TRIMESTER, GESTATIONAL DIABETES METHOD OF CONTROL UNSPECIFIED: ICD-10-CM

## 2021-07-01 DIAGNOSIS — Z23 NEED FOR VACCINATION: ICD-10-CM

## 2021-07-01 DIAGNOSIS — Z3A.27 27 WEEKS GESTATION OF PREGNANCY: ICD-10-CM

## 2021-07-01 LAB
SL AMB  POCT GLUCOSE, UA: NEGATIVE
SL AMB POCT URINE PROTEIN: NEGATIVE

## 2021-07-01 PROCEDURE — 90471 IMMUNIZATION ADMIN: CPT

## 2021-07-01 PROCEDURE — 90715 TDAP VACCINE 7 YRS/> IM: CPT

## 2021-07-01 PROCEDURE — 81002 URINALYSIS NONAUTO W/O SCOPE: CPT | Performed by: STUDENT IN AN ORGANIZED HEALTH CARE EDUCATION/TRAINING PROGRAM

## 2021-07-01 PROCEDURE — PNV: Performed by: STUDENT IN AN ORGANIZED HEALTH CARE EDUCATION/TRAINING PROGRAM

## 2021-07-01 NOTE — PROGRESS NOTES
OB/GYN  PN Visit  Mel Zhu  [id number]  2021  10:30 AM  Dr Shade Loera MD    S: 27 y o  [id number] 27w4d here for PN visit  She has denies obstetric complaints, including pelvic pain, contractions, vaginal bleeding, loss of fluid, or decreased fetal movement  But       O:  Vitals:    21 1001   BP: 113/70   Pulse: 88         Physical examination   Cardio-pulmonar  Normal vesicular murmur, no rales,  nonlabored breathing , normal S1/S2 no cardiac murmurs , no gallops   Abdomen  Soft , no tender, no signs or peritoneal irritation   Extremities  mobiles no edemas  Fundal height: 27      A/P:    Problem List Items Addressed This Visit     None      Visit Diagnoses     Prenatal care in second trimester    -  Primary    Relevant Orders    POCT urine dip (Completed)          D 27 y o  [id number] 27w4d  here for prenatal care without obstetric complaints today  In this visit we addressed:    IUP at 27 weeks and 4 days   Prenatal panel 3/20/21 HIV negative, RPR negative, Rubella immune, hep B negative  CL/GC negative 3/5/2021  Last US 21 breech, EFW 938g, 71% placenta fundal   Twenty-eight labs with evidence of elevated 1 hour GTT  Tdap vaccine provided in this visit  We signed delivery consent today  Gestational diabetes  1 hour GTT of 207  Patient has been referral with M diabetes and with diabetes educators  We have discussed diet modifications     In addition we have discussed possible complications related with uncontrolled gestational diabetes as, fetal macrosomia, fetal demise,  hypoglycemia, increased risks of labor complications as shoulder dystocia and increased risk of  ICU admission     Birth plan:   , she declined Epidural in her labor  Breastfeeding: yes  Contraception: micronor  Discussed  labor precautions and fetal kick counts    Return to clinic in 2 weeks    Maternal  BMI 35  Complications associated  with increase BMI  were discussed with patient as hypertensive disorders of pregnancy, gestational diabetes, fetal demise,  birth, fetal macrosomia  serious cardiac, respiratory and hematologic conditions, thromboembolic events and anesthesia related complications      Patient D/W with Dr Jorge Hatch MD  2021  10:30 AM

## 2021-07-14 ENCOUNTER — TELEMEDICINE (OUTPATIENT)
Dept: PERINATAL CARE | Facility: CLINIC | Age: 26
End: 2021-07-14
Payer: COMMERCIAL

## 2021-07-14 ENCOUNTER — DOCUMENTATION (OUTPATIENT)
Dept: PERINATAL CARE | Facility: CLINIC | Age: 26
End: 2021-07-14

## 2021-07-14 DIAGNOSIS — O24.410 DIET CONTROLLED GESTATIONAL DIABETES MELLITUS (GDM) IN THIRD TRIMESTER: Primary | ICD-10-CM

## 2021-07-14 DIAGNOSIS — Z3A.29 29 WEEKS GESTATION OF PREGNANCY: ICD-10-CM

## 2021-07-14 DIAGNOSIS — O99.213 OBESITY COMPLICATING PREGNANCY, THIRD TRIMESTER: ICD-10-CM

## 2021-07-14 PROCEDURE — G0109 DIAB MANAGE TRN IND/GROUP: HCPCS | Performed by: DIETITIAN, REGISTERED

## 2021-07-14 RX ORDER — BLOOD SUGAR DIAGNOSTIC
STRIP MISCELLANEOUS
Qty: 100 STRIP | Refills: 4 | Status: SHIPPED | OUTPATIENT
Start: 2021-07-14 | End: 2021-08-05

## 2021-07-14 RX ORDER — LANCETS 33 GAUGE
EACH MISCELLANEOUS
Qty: 100 EACH | Refills: 4 | Status: SHIPPED | OUTPATIENT
Start: 2021-07-14 | End: 2021-09-26

## 2021-07-14 RX ORDER — BLOOD-GLUCOSE METER
EACH MISCELLANEOUS
Qty: 1 KIT | Refills: 0 | Status: SHIPPED | OUTPATIENT
Start: 2021-07-14 | End: 2021-12-17 | Stop reason: ALTCHOICE

## 2021-07-14 NOTE — PROGRESS NOTES
Virtual Regular Visit    Verification of patient location:    Patient is currently located in the state of [place]  Patient is currently located in a state in which I am licensed    Assessment/Plan:    Problem List Items Addressed This Visit     None      Visit Diagnoses     Gestational diabetes mellitus (GDM) in second trimester, gestational diabetes method of control unspecified                   Reason for visit is   Chief Complaint   Patient presents with    Virtual Regular Visit        Encounter provider Lorena Tong    Provider located at [address] [place] [id number] [phone]      Recent Visits  No visits were found meeting these conditions  Showing recent visits within past 7 days and meeting all other requirements  Future Appointments  No visits were found meeting these conditions  Showing future appointments within next 150 days and meeting all other requirements       The patient was identified by name and date of birth  Havery Cousin was informed that this is a telemedicine visit and that the visit is being conducted through [address] [place] [address] Now and patient was informed that this is a secure, HIPAA-compliant platform  She agrees to proceed     My office door was closed  No one else was in the room  She acknowledged consent and understanding of privacy and security of the video platform  The patient has agreed to participate and understands they can discontinue the visit at any time  Patient is aware this is a billable service  Subjective  Havery Cousin is a 27 y o  female pregnant patient         HPI     Past Medical History:   Diagnosis Date    Depression        Past Surgical History:   Procedure Laterality Date    EAR TUBE REMOVAL         Current Outpatient Medications   Medication Sig Dispense Refill    doxylamine (UNISON) 25 MG tablet Take 1 tablet (25 mg total) by mouth daily at bedtime as needed for sleep or nausea (Patient not taking: Reported on 3/24/2021) 30 tablet 0    Prenatal Multivit-Min-Fe-FA (PRE- FORMULA PO) Take by mouth (Patient not taking: Reported on 2021)      pyridoxine (B-6) 25 MG tablet Take 1 tablet (25 mg total) by mouth daily (Patient not taking: Reported on 3/24/2021) 30 tablet 3    sertraline (ZOLOFT) 25 mg tablet Take 1 tablet (25 mg total) by mouth daily (Patient not taking: Reported on 3/24/2021) 30 tablet 3     No current facility-administered medications for this visit  No Known Allergies    Review of Systems    Video Exam    There were no vitals filed for this visit  Physical Exam --not performed  Time spent with the patient: 60 minutes  VIRTUAL VISIT DISCLAIMER      Talha Braswell verbally agrees to participate in [employer]  Pt is aware that [employer] could be limited without vital signs or the ability to perform a full hands-on physical [address] understands she or the provider may request at any time to terminate the video visit and request the patient to seek care or treatment in person  21  Diamond Greco Erin   1995  Estimated Date of Delivery: 21   EGA: 29w3d    Dear Neena at [address] [place] [address] Drive:     Thank you for referring your patient to the Diabetes and Pregnancy Program at [address] [place] [address]  The patient attended Group class 1 and patient received the following education via virtual telemedicine:     Pathophysiology of diabetes and pregnancy  This includes maternal-fetal complications such as fetal macrosomia,  hypoglycemia, polyhydramnios, increased incidence of  section, pre-term labor and in severe cases, fetal demise and stillbirth   Instruction on diet and glucometer use was provided   Self-monitoring of blood glucose levels: fasting (goal 60mg/dl to 90mg/dl) and two hours after the start of the meal less (goal less than 120mg/dl)  The patient was provided with a One-Touch Verio blood glucose meter and supplies  Had purchased a [employer] Metrics Air meter on her own & reports her FBS is  & all after meal BG is less than 113   Medical Nutrition Therapy for diabetes and pregnancy  The patient was provided with a 2000 calorie meal plan and the following was reviewed:     o Basic review of macronutrients  Reports she is trying to change her eating habits instead of eating her typical French foods  Already went grocery shopping & purchases many healthy foods  o Meal pattern should consist of three small meals and three snacks daily  o Carbohydrate gram amounts per meal   o Instructions on how to read a food label  o Appropriate serving sizes for carbohydrates and proteins  o Incorporating protein at each meal and snack  o Maintain a three day food diary and bring to class 2    Report blood glucose levels to the [facility] weekly or as directed:  o Phone : [phone]  If no response in 24 hours, call [phone]   o Good4[employer] Glucose Flow Sheet  The patient is scheduled to attend class 2 on Thursday, 7/22/21  Additionally, fetal ultrasound evaluation by the Perinatologist has been scheduled to assure continuity of care  Patient Stated Goal: "I will walk 20-30 minutes after dinner daily"     Diabetes Self Management Support Plan outside of ongoing care: Spouse/Family    Please contact the Diabetes and Pregnancy Program at [phone] if you have any questions  Time spent with patient 2-3 PM; time spent face to face counseling greater than 50% of the appointment      Sincerely,   Broderick Siemens  Diabetes Educator   Diabetes and Pregnancy Program

## 2021-07-14 NOTE — PROGRESS NOTES
Demographics:  Language: [place]  Ethnicity: / / [address] of Origin: [place]    Education/Occupation:  Highest grade completed: [employer]  Occupation: Clerical Customer Service for [employer] worked per week: Full Time (40 hours/week)  Shift worked: Afternoon (11:45 AM- 8 PM)  May we contact you at work? Yes as works from home    Personal & Family History:  Personal history of diabetes? no  Family members with diabetes: Diabetes in the family    Pregnancy History:  How many total pregnancies have you had? 3  How many children do you have? 2  Have you had a baby weighing larger than 9 lbs? no  Are you having swelling or fluid retention? no  Have you been placed on bedrest? no    Physical Activity:  Do you exercise? yes  Type of Exercise: Other Bounces on yoga ball & does housefold chores  Frequency of Exercise: Daily    Nutrition Questionnaire: How many meals do you eat daily? 3  List times of meals: Breakfast: 11 AM/ Lunch: 4-5 PM/ Dinner: 8 PM  How many snacks do you eat daily? Other Tyrying to eat some  What type of diet are you following at home? Other Trying to change to eating healthier foods  Do you have special or ethnic dietary preferences? yes Mauritian foods, but trying to avoid  Do you have any food allergies or intolerances? yes Avoiding sweets now  Learning preferences: How do you learn best? All types of learning  How do you rate your health? Good  Who is your primary support person? Spouse and Family Member Mother  How do you cope with stress? Ignores things or cries  Do you have any cultural or Quaker beliefs we should be aware of? no  How do you feel the diabetes diagnosis will affect the rest of your pregnancy?  Depressed  Do you receive WIC or food stamps? no

## 2021-07-20 ENCOUNTER — HOSPITAL ENCOUNTER (EMERGENCY)
Facility: HOSPITAL | Age: 26
Discharge: HOME | End: 2021-07-21
Attending: EMERGENCY MEDICINE | Admitting: EMERGENCY MEDICINE
Payer: COMMERCIAL

## 2021-07-20 VITALS
HEART RATE: 98 BPM | RESPIRATION RATE: 16 BRPM | OXYGEN SATURATION: 98 % | TEMPERATURE: 98 F | SYSTOLIC BLOOD PRESSURE: 104 MMHG | DIASTOLIC BLOOD PRESSURE: 57 MMHG

## 2021-07-20 DIAGNOSIS — R10.12 LEFT UPPER QUADRANT ABDOMINAL PAIN: Primary | ICD-10-CM

## 2021-07-20 PROCEDURE — 87086 URINE CULTURE/COLONY COUNT: CPT | Performed by: STUDENT IN AN ORGANIZED HEALTH CARE EDUCATION/TRAINING PROGRAM

## 2021-07-20 PROCEDURE — 99284 EMERGENCY DEPT VISIT MOD MDM: CPT

## 2021-07-20 PROCEDURE — 81001 URINALYSIS AUTO W/SCOPE: CPT | Performed by: STUDENT IN AN ORGANIZED HEALTH CARE EDUCATION/TRAINING PROGRAM

## 2021-07-20 PROCEDURE — 99284 EMERGENCY DEPT VISIT MOD MDM: CPT | Performed by: EMERGENCY MEDICINE

## 2021-07-21 LAB
BACTERIA UR QL AUTO: NORMAL /HPF
BILIRUB UR QL STRIP: NEGATIVE
CLARITY UR: CLEAR
COLOR UR: ABNORMAL
GLUCOSE UR STRIP-MCNC: ABNORMAL MG/DL
HGB UR QL STRIP.AUTO: NEGATIVE
HYALINE CASTS #/AREA URNS LPF: NORMAL /LPF
KETONES UR STRIP-MCNC: ABNORMAL MG/DL
LEUKOCYTE ESTERASE UR QL STRIP: ABNORMAL
NITRITE UR QL STRIP: NEGATIVE
NON-SQ EPI CELLS URNS QL MICRO: NORMAL /HPF
PH UR STRIP.AUTO: 6 [PH]
PROT UR STRIP-MCNC: NEGATIVE MG/DL
RBC #/AREA URNS AUTO: NORMAL /HPF
SP GR UR STRIP.AUTO: 1.03 (ref 1–1.03)
UROBILINOGEN UR QL STRIP.AUTO: 1 E.U./DL
WBC #/AREA URNS AUTO: NORMAL /HPF

## 2021-07-21 NOTE — DISCHARGE INSTRUCTIONS
Please keep your appointment with your OBGYN  Return to the emergency department if you experience fever, contractions, or vaginal bleeding

## 2021-07-21 NOTE — ED PROVIDER NOTES
History  Chief Complaint   Patient presents with    Abdominal Pain Pregnant     Pt having L sided abdominal pain  Also vomiting and diarrhea  Describes the pain as really bad gas pain  SILVIA 2021  Pain began yesterday, lasted all during the night and now is having intermittent pains  Have not spoken to OB  HPI  49-year-old female who is 30 weeks pregnant with gestational diabetes presents with a complaint of left-sided abdominal pain  Patient is currently not experiencing the pain at the moment  Pain 1st occurred about a month ago who sudden onset felt like a gassy pressure and resolved spontaneously  The pain then occurred again last night causing her to have an episode of nausea and vomiting nonbloody and then again earlier this morning  When these bouts of pressure and pain occur they can last up to 2 hours and resolved spontaneously  Patient denies any fevers, chills, current nausea or vomiting, chest pain, shortness of breath, constipation, or headaches  Patient admits to nonbloody diarrhea  Prior to Admission Medications   Prescriptions Last Dose Informant Patient Reported? Taking?    Blood Glucose Monitoring Suppl (OneTouch Verio Flex System) w/Device KIT   No No   Sig: Test 4 times daily   OneTouch Delica Lancets 25I MISC   No No   Sig: Test 4 times daily   OneTouch Verio test strip   No No   Sig: Test 4 times daily   Prenatal Multivit-Min-Fe-FA (PRE-BIMAL FORMULA PO)  Self Yes No   Sig: Take by mouth   Patient not taking: Reported on 2021   doxylamine (UNISON) 25 MG tablet  Self No No   Sig: Take 1 tablet (25 mg total) by mouth daily at bedtime as needed for sleep or nausea   Patient not taking: Reported on 3/24/2021   pyridoxine (B-6) 25 MG tablet  Self No No   Sig: Take 1 tablet (25 mg total) by mouth daily   Patient not taking: Reported on 3/24/2021   sertraline (ZOLOFT) 25 mg tablet  Self No No   Sig: Take 1 tablet (25 mg total) by mouth daily   Patient not taking: Reported on 3/24/2021      Facility-Administered Medications: None       Past Medical History:   Diagnosis Date    Depression     Diabetes, gestational        Past Surgical History:   Procedure Laterality Date    EAR TUBE REMOVAL         Family History   Problem Relation Age of Onset    Thyroid disease Mother     No Known Problems Father     No Known Problems Sister     No Known Problems Daughter     No Known Problems Sister     Diabetes Family     Heart disease Family      I have reviewed and agree with the history as documented  E-Cigarette/Vaping    E-Cigarette Use Former User     Comments [facility] 01/[facility]      E-Cigarette/Vaping Substances    Nicotine No     THC No     CBD No     Flavoring No     Other No     Unknown No      Social History     Tobacco Use    Smoking status: Never Smoker    Smokeless tobacco: Former User   Vaping Use    Vaping Use: Former   Substance Use Topics    Alcohol use: No    Drug use: No        Review of Systems   Constitutional: Negative for chills and fever  HENT: Negative for congestion, ear pain, rhinorrhea and sore throat  Eyes: Negative for pain  Respiratory: Negative for apnea, cough, choking, chest tightness, shortness of breath, wheezing and stridor  Cardiovascular: Negative for chest pain, palpitations and leg swelling  Gastrointestinal: Negative for abdominal pain, constipation, diarrhea, nausea and vomiting  Genitourinary: Negative for hematuria  Musculoskeletal: Negative for arthralgias and back pain  Skin: Negative for rash and wound  Neurological: Negative for dizziness  Psychiatric/Behavioral: Negative for agitation and hallucinations  All other systems reviewed and are negative        Physical Exam  ED Triage Vitals   Temperature Pulse Respirations Blood Pressure SpO2   07/20/21 2112 07/20/21 2112 07/20/21 2112 07/20/21 2112 07/20/21 2112   98 °F (36 7 °C) 101 18 112/92 98 %      Temp Source Heart Rate Source Patient Position - Orthostatic VS BP Location FiO2 (%)   07/20/21 2112 07/20/21 2341 -- 07/20/21 2341 --   Oral Monitor  Right arm       Pain Score       07/20/21 2341       No Pain             Orthostatic Vital Signs  Vitals:    07/20/21 2112 07/20/21 2341   BP: 112/92 104/57   Pulse: 101 98       Physical Exam  Vitals reviewed  Constitutional:       General: She is not in acute distress  Appearance: She is well-developed  HENT:      Head: Normocephalic and atraumatic  Right Ear: External ear normal       Left Ear: External ear normal       Nose: Nose normal       Mouth/Throat:      Mouth: Mucous membranes are moist    Eyes:      Extraocular Movements: Extraocular movements intact  Conjunctiva/sclera: Conjunctivae normal       Pupils: Pupils are equal, round, and reactive to light  Cardiovascular:      Rate and Rhythm: Normal rate and regular rhythm  Heart sounds: Normal heart sounds  Pulmonary:      Effort: Pulmonary effort is normal  No respiratory distress  Breath sounds: Normal breath sounds  No wheezing or rales  Abdominal:      Palpations: Abdomen is soft  Tenderness: There is no abdominal tenderness  Musculoskeletal:         General: Normal range of motion  Cervical back: Normal range of motion and neck supple  No rigidity  Skin:     General: Skin is warm  Neurological:      General: No focal deficit present  Mental Status: She is alert and oriented to person, place, and time     Psychiatric:         Mood and Affect: Mood normal          ED Medications  Medications - No data to display    Diagnostic Studies  Results Reviewed     Procedure Component Value Units Date/Time    Urine Microscopic [[id number]] Collected: 07/20/21 2341    Lab Status: Final result Specimen: Urine, Clean Catch Updated: 07/21/21 0005     RBC, UA None Seen /hpf      WBC, UA 2-4 /hpf      Epithelial Cells None Seen /hpf      Bacteria, UA None Seen /hpf      Hyaline Casts, UA None Seen /lpf      URINE COMMENT --    UA w Reflex to Microscopic w Reflex to Culture [[id number]]  (Abnormal) Collected: 07/20/21 2341    Lab Status: Final result Specimen: Urine, Clean Catch Updated: 07/21/21 0002     Color, UA Dk Yellow     Clarity, UA Clear     Specific Gravity, UA 1 035     pH, UA 6 0     Leukocytes, UA Elevated glucose may cause decreased leukocyte values  See urine microscopic for [facility] result/     Nitrite, UA Negative     Protein, UA Negative mg/dl      Glucose, UA >=1000 (1%) mg/dl      Ketones, UA Trace mg/dl      Urobilinogen, UA 1 0 E U /dl      Bilirubin, UA Negative     Blood, UA Negative     URINE COMMENT --    Urine culture [[id number]] Collected: 07/20/21 2341    Lab Status: In process Specimen: Urine, Clean Catch Updated: 07/21/21 0002                 No orders to display         Procedures  POC Pelvic US    Date/Time: 7/21/2021 3:55 AM  Performed by: Claudio Gray DO  Authorized by: Claudio Gray DO     Patient location:  ED  Procedure details:     Exam Type:  Educational    Indications: evaluate for IUP and pregnant with abdominal pain      Assessment for: evaluate fetal viability      Technique:  Transabdominal obstetric (HCG+) exam    Image quality: limited diagnostic      Image availability:  Images available in PACS  Uterine findings:     Intrauterine pregnancy: identified      Single gestation: identified      Fetal heart rate: identified      Fetal heart rate (bpm):  133  Interpretation:     Pregnancy findings: intrauterine pregnancy (IUP)            ED Course                                       MDM  Number of Diagnoses or Management Options  73-year-old female who is 30 weeks pregnant with gestational diabetes presents with a complaint of left-sided abdominal pain      Ddx: GERD, GI dysmotility, doubt contractions, doubt uterine rupture, doubt UTI, doubt pancreatitis, doubt biliary pathology    Patient presents to the ED without any abdominal pain and has not abdominal pain since earlier this morning  The pain itself is described as a gassy like pressure at spontaneously resolves  Patient's UA is negative for bacteriuria  Patient's abdominal exam is benign and she does not exhibit any tenderness to palpation  Fetal heart rate is detected with ultrasound and mom reports normal fetal activity  Patient has appointment with her Ob this month  Patient is comfortable discharge and is given return precautions  Disposition  Final diagnoses:   Left upper quadrant abdominal pain     Time reflects when diagnosis was documented in both MDM as applicable and the Disposition within this note     Time User Action Codes Description Comment    2021 12:06 AM Luis Antonio Elam Add [R10 12] Left upper quadrant abdominal pain       ED Disposition     ED Disposition Condition Date/Time Comment    Discharge Stable  12:06 AM Havery Cousin discharge to home/self care  Follow-up Information    None         Discharge Medication List as of 2021 12:07 AM      CONTINUE these medications which have NOT CHANGED    Details   Blood Glucose Monitoring Suppl (OneTouch Verio Flex System) w/Device KIT Test 4 times daily, Normal      doxylamine (UNISON) 25 MG tablet Take 1 tablet (25 mg total) by mouth daily at bedtime as needed for sleep or nausea, Starting Fri 3/5/2021, Normal      OneTouch Delica Lancets 92Q MISC Test 4 times daily, Normal      OneTouch Verio test strip Test 4 times daily, Normal      Prenatal Multivit-Min-Fe-FA (PRE- FORMULA PO) Take by mouth, Historical Med      pyridoxine (B-6) 25 MG tablet Take 1 tablet (25 mg total) by mouth daily, Starting Fri 3/5/2021, Normal      sertraline (ZOLOFT) 25 mg tablet Take 1 tablet (25 mg total) by mouth daily, Starting Fri 3/5/2021, Normal           No discharge procedures on file  PDMP Review     None           ED Provider  Attending physically available and evaluated Havery Cousin   I managed the patient along with the ED Attending      Electronically Signed by         Robel Fall DO  07/21/21 7854

## 2021-07-22 ENCOUNTER — ROUTINE PRENATAL (OUTPATIENT)
Dept: OBGYN CLINIC | Facility: CLINIC | Age: 26
End: 2021-07-22

## 2021-07-22 ENCOUNTER — TELEMEDICINE (OUTPATIENT)
Dept: PERINATAL CARE | Facility: CLINIC | Age: 26
End: 2021-07-22
Payer: COMMERCIAL

## 2021-07-22 ENCOUNTER — DOCUMENTATION (OUTPATIENT)
Dept: PERINATAL CARE | Facility: CLINIC | Age: 26
End: 2021-07-22

## 2021-07-22 VITALS
HEART RATE: 89 BPM | DIASTOLIC BLOOD PRESSURE: 72 MMHG | WEIGHT: 186 LBS | BODY MASS INDEX: 35.12 KG/M2 | HEIGHT: 61 IN | SYSTOLIC BLOOD PRESSURE: 104 MMHG

## 2021-07-22 DIAGNOSIS — O99.213 OBESITY AFFECTING PREGNANCY IN THIRD TRIMESTER: ICD-10-CM

## 2021-07-22 DIAGNOSIS — Z3A.30 30 WEEKS GESTATION OF PREGNANCY: ICD-10-CM

## 2021-07-22 DIAGNOSIS — Z34.93 PRENATAL CARE IN THIRD TRIMESTER: Primary | ICD-10-CM

## 2021-07-22 DIAGNOSIS — O24.419 GESTATIONAL DIABETES MELLITUS (GDM) IN THIRD TRIMESTER, GESTATIONAL DIABETES METHOD OF CONTROL UNSPECIFIED: Primary | ICD-10-CM

## 2021-07-22 PROBLEM — O24.410 DIET CONTROLLED GESTATIONAL DIABETES MELLITUS (GDM): Status: ACTIVE | Noted: 2021-07-22

## 2021-07-22 LAB
BACTERIA UR CULT: NORMAL
SL AMB  POCT GLUCOSE, UA: NORMAL
SL AMB POCT URINE PROTEIN: NORMAL

## 2021-07-22 PROCEDURE — PNV: Performed by: STUDENT IN AN ORGANIZED HEALTH CARE EDUCATION/TRAINING PROGRAM

## 2021-07-22 PROCEDURE — G0108 DIAB MANAGE TRN  PER INDIV: HCPCS

## 2021-07-22 PROCEDURE — 81002 URINALYSIS NONAUTO W/O SCOPE: CPT | Performed by: STUDENT IN AN ORGANIZED HEALTH CARE EDUCATION/TRAINING PROGRAM

## 2021-07-22 NOTE — PROGRESS NOTES
Virtual Regular Visit    Verification of patient location:    Patient is currently located in the state [facility]  Patient is currently located in a state in which I am licensed    Assessment/Plan:    Problem List Items Addressed This Visit        Other    30 weeks gestation of pregnancy      Other Visit Diagnoses     Gestational diabetes mellitus (GDM) in third trimester, gestational diabetes method of control unspecified    -  Primary    Obesity affecting pregnancy in third trimester                   Reason for visit is   Chief Complaint   Patient presents with    Gestational Diabetes    Patient Education    Virtual Regular Visit        Encounter provider Wynell Simmonds    Provider located at [address] [place] [address] Dr Tomi [place] [address] [phone]      Recent Visits  No visits were found meeting these conditions  Showing recent visits within past 7 days and meeting all other requirements  Today's Visits  Date Type Provider Dept   07/22/21 1401 [address]   Showing today's visits and meeting all other requirements  Future Appointments  No visits were found meeting these conditions  Showing future appointments within next 150 days and meeting all other requirements       The patient was identified by name and date of birth  El Womack was informed that this is a telemedicine visit and that the visit is being conducted through [address] [place] [address] Road Now and patient was informed that this is a secure, HIPAA-compliant platform  She agrees to proceed     My office door was closed  No one else was in the room  She acknowledged consent and understanding of privacy and security of the video platform  The patient has agreed to participate and understands they can discontinue the visit at any time  Patient is aware this is a billable service  Subjective  El Womack is a 27 y  o pregnant female         HPI     Past Medical History:   Diagnosis Date    Depression     Diabetes, gestational        Past Surgical History:   Procedure Laterality Date    EAR TUBE REMOVAL         Current Outpatient Medications   Medication Sig Dispense Refill    Blood Glucose Monitoring Suppl (OneTouch Verio Flex System) w/Device KIT Test 4 times daily 1 kit 0    doxylamine (UNISON) 25 MG tablet Take 1 tablet (25 mg total) by mouth daily at bedtime as needed for sleep or nausea (Patient not taking: Reported on 3/24/2021) 30 tablet 0    OneTouch Delica Lancets 39R MISC Test 4 times daily 100 each 4    OneTouch Verio test strip Test 4 times daily 100 strip 4    Prenatal Multivit-Min-Fe-FA (PRE-BIMAL FORMULA PO) Take by mouth       pyridoxine (B-6) 25 MG tablet Take 1 tablet (25 mg total) by mouth daily (Patient not taking: Reported on 3/24/2021) 30 tablet 3    sertraline (ZOLOFT) 25 mg tablet Take 1 tablet (25 mg total) by mouth daily (Patient not taking: Reported on 3/24/2021) 30 tablet 3     No current facility-administered medications for this visit  No Known Allergies    Review of Systems    Video Exam    There were no vitals filed for this visit  Physical Exam     I spent 60 minutes with patient today in which greater than 50% of the time was spent in counseling/coordination of care regarding gestational diabetes  VIRTUAL VISIT DISCLAIMER      Elliot Demarco verbally agrees to participate in [employer]  Pt is aware that [employer] could be limited without vital signs or the ability to perform a full hands-on physical exam  Meganallison Claudia understands she or the provider may request at any time to terminate the video visit and request the patient to seek care or treatment in person      DATE:  21  RE: Elliot Demarco    : 1995    SILVIA: Estimated Date of Delivery: 21    EGA: 30w4d  Referring Provider: Yves Ivory    Thank you for referring your patient to the Diabetes and Pregnancy Program at [address] [place] [address]  The patient attended Class 2 received the following education:    Weight gain during in pregnancy  Based on the patients height of 61 inches, pre-pregnancy weight of 179 8 pounds 33 9 BMI, we would recommend a total weight gain of 11-20 pounds for the pregnancy   The patients current weight is 186 pounds, and her weight gain to date is approximately 6 8 pounds  Based on this, we recommend a weight gain of no more than 13 pounds for the remainder of the pregnancy   Medical Nutrition Therapy for diabetes and pregnancy  The patients three day food diary was reviewed and discussed  The patient was instructed on the following:  o Individualized meal plan    o Use of food diary to maintain a meal plan    o Importance of protein as it relates to blood glucose control   Review of blood glucose log  Reinforcement of blood glucose goals and reporting guidelines   Ultrasounds every four weeks in the [facility] to evaluate fetal growth   Exercise Guidelines:   o Walking up to thirty minutes daily can reduce blood glucose levels  o Monitor for greater than four contractions per hour     o The patient has been instructed not to begin physical activity if she has been instructed not to exercise by your office   Sick day guidelines and hypoglycemia with treatment   Post-partum guidelines:  o Completion of a 75 gram glucose tolerance test at 6 weeks post-partum to check for type 2 diabetes  o 20% weight loss and 30 minutes of exercise 5 times per week reduces the risk of type 2 diabetes   Breastfeeding guidelines   Report blood glucose levels to [facility] weekly or as directed  o Phone: [phone]  If no response in 24 hours, call [phone]    o Fax: [phone]  o Mychart  o Medication options were also reviewed      Diabetes Self Management Support Plan outside of ongoing care: Spouse/Family    Please contact the Diabetes and Pregnancy Program at [phone] if you have questions  Time spent with patient 2:00-3:00 PM; time spent face to face counseling greater than 50% of the appointment        Fredo Geronimo RD,LDN,CDE  Diabetes Educator  Diabetes and Pregnancy Program

## 2021-07-22 NOTE — PROGRESS NOTES
Date:  21  RE: Ryan Farnsworth    : 1995  Estimated Date of Delivery: 21  EGA: 30w4d  Referring Provider: Loyda Sadler           Patient reported  2 hr pp breakfast also included snack eaten fairly close to measurement  Assessment / Plan:  SMBG 4 x per day (Fasting, 2 hour after start of each meal) using OneTouch Verio Flex glucose meter  Continue 2000 calorie meal plan consisting of 3 meals and 3 snacks, including protein at each  Advised patient to change bedtime snack to 1 serving CHO and 2-3 ounces of protein  Maintain no longer than 10 hr fast overnight until breakfast meal the next day  TIming of meals and snack schedule including 2 hr pp measurements reviewed  Suggested setting phone alarm for reminders to check on time and to eat on schedule if needed  If okay by OB, walk 20-30 minutes daily  Medication options discussed and patient understands that medication may need to be added to regimen on next report  Patient will need insulin education appointment scheduled  Diabetes Self Management Support Plan outside of ongoing care: Spouse/Family  21 US: fetal growth and JAYLA appeared normal  Advised patient to schedule ultrasound     Date due to report next:  Requested patient report on Tuesday,   At minimum weekly or as recommended       [facility]   Diabetes and Pregnancy Program

## 2021-07-22 NOTE — PROGRESS NOTES
OB/GYN prenatal visit    S: 27 y o   30w4d here for PN visit  She denies pelvic pain, contractions, vaginal bleeding, loss of fluid, and reports good fetal movement       O:  Vitals:    21 0932   BP: 104/72   Pulse: 89       Gen: no acute distress, nonlabored breathing  FHR: 140    A/P:     Problem List        Endocrine    Diet controlled gestational diabetes mellitus (GDM)    Overview     1 hour GTT of 207  Patient following with diabetes educators  Counseled on possible complications related with uncontrolled gestational diabetes such as, fetal macrosomia, fetal demise,  hypoglycemia, increased risks of labor complications as shoulder dystocia and increased risk of  ICU admission             Other    30 weeks gestation of pregnancy    Overview     Last Ultrasound  was normal   TDAP vaccine 21  Delivery consent signed 21  Contraception: Micronor  Breastfeeding: yes  Birth plan: declines epidural  Discussed  labor precautions and fetal kick counts    Continue prenatal vitamins   Return to clinic in 2 weeks           First trimester pregnancy           Toni Parmar MD

## 2021-07-28 NOTE — ED ATTENDING ATTESTATION
7/20/2021  ITroy MD, saw and evaluated the patient  I have discussed the patient with the resident/non-physician practitioner and agree with the resident's/non-physician practitioner's findings, Plan of Care, and MDM as documented in the resident's/non-physician practitioner's note, except where noted  All available labs and Radiology studies were reviewed  I was present for key portions of any procedure(s) performed by the resident/non-physician practitioner and I was immediately available to provide assistance  At this point I agree with the current assessment done in the Emergency Department  I have conducted an independent evaluation of this patient a history and physical is as follows:    ED Course     Patient is a 78-year-old female approximately 30 weeks pregnant presents with left-sided abdominal pain vomiting diarrhea patient describes the pain as gas pain  The abdominal pain has resolved at this time a presentation to the emergency department  She denies any vaginal bleeding discharge contractions    Vital signs reviewed  Abdomen is gravid nontender nondistended normal bowel sounds  Positive fetal heart tones  Impression abdominal pain diarrhea suspect likely viral etiology will check urinalysis disclosed UTI  Patient's abdominal exam is reassuring  Will discharge patient with close follow-up with OBGYN  Strict return precautions given       Critical Care Time  Procedures

## 2021-07-29 ENCOUNTER — OFFICE VISIT (OUTPATIENT)
Dept: PERINATAL CARE | Facility: CLINIC | Age: 26
End: 2021-07-29
Payer: COMMERCIAL

## 2021-07-29 VITALS
HEIGHT: 61 IN | SYSTOLIC BLOOD PRESSURE: 116 MMHG | HEART RATE: 89 BPM | WEIGHT: 187.4 LBS | BODY MASS INDEX: 35.38 KG/M2 | DIASTOLIC BLOOD PRESSURE: 59 MMHG

## 2021-07-29 DIAGNOSIS — Z3A.31 31 WEEKS GESTATION OF PREGNANCY: ICD-10-CM

## 2021-07-29 DIAGNOSIS — O99.810 HYPERGLYCEMIA IN PREGNANCY: ICD-10-CM

## 2021-07-29 DIAGNOSIS — O24.419 GESTATIONAL DIABETES MELLITUS (GDM) IN THIRD TRIMESTER, GESTATIONAL DIABETES METHOD OF CONTROL UNSPECIFIED: ICD-10-CM

## 2021-07-29 DIAGNOSIS — O99.213 OBESITY AFFECTING PREGNANCY IN THIRD TRIMESTER: ICD-10-CM

## 2021-07-29 PROCEDURE — 99214 OFFICE O/P EST MOD 30 MIN: CPT | Performed by: NURSE PRACTITIONER

## 2021-07-29 RX ORDER — INSULIN GLARGINE 100 [IU]/ML
20 INJECTION, SOLUTION SUBCUTANEOUS
Qty: 15 ML | Refills: 0 | Status: SHIPPED | OUTPATIENT
Start: 2021-07-29 | End: 2021-09-01 | Stop reason: SDUPTHER

## 2021-07-29 NOTE — ASSESSMENT & PLAN NOTE
-Pre-pregnancy weight 180 lbs  -Current weight 187 lbs  -Current BMI 35 41   -Recommended weight gain 11 to 20 lbs  -Continue GDM diet

## 2021-07-29 NOTE — PROGRESS NOTES
Assessment/Plan:    Obesity affecting pregnancy in third trimester  -Pre-pregnancy weight 180 lbs  -Current weight 187 lbs  -Current BMI 35 41   -Recommended weight gain 11 to 20 lbs  -Continue GDM diet  Hyperglycemia in pregnancy  -Due to FBS>90, start Lantus 20 units at 9 or 10 PM daily  Gestational diabetes mellitus (GDM) in third trimester  -Check A1c and CMP  -A1c goal 5 6% or less  -Due to FBS>90, start Lantus 20 units at 9 or 10 PM daily   -Add 3 AM check until next report on Monday, 8/2/21   -Continue GDM diet and SMBG  -Report on Monday, 8/2/21    -Glucose goals fasting 60-90; 2 hours post meal 120 or less and overnight   -Always have glucose available to treat hypoglycemia, use 15 by 15 rule  -Walk up to 30 minutes a day  -Stay in close contact with diabetes team   -Fetal growth ultrasound every 4 weeks or as recommended  Starting at 32 weeks gestation, NST twice a week and JAYLA weekly   -Continue follow up with OB and MFM as recommended  No results found for: HGBA1C       Diagnoses and all orders for this visit:    Gestational diabetes mellitus (GDM) in third trimester, gestational diabetes method of control unspecified  -     insulin glargine (Lantus SoloStar) 100 units/mL injection pen; Inject 20 Units under the skin daily at bedtime At 9 PM daily  To be titrated  GDM  -     Insulin Pen Needle 31G X 5 MM MISC; Inject under the skin daily at bedtime Use one a day or as directed  -     Hemoglobin A1C; Standing  -     Comprehensive metabolic panel; Future  -     Hemoglobin A1C    31 weeks gestation of pregnancy  -     insulin glargine (Lantus SoloStar) 100 units/mL injection pen; Inject 20 Units under the skin daily at bedtime At 9 PM daily  To be titrated  GDM  -     Insulin Pen Needle 31G X 5 MM MISC; Inject under the skin daily at bedtime Use one a day or as directed  -     Hemoglobin A1C; Standing  -     Comprehensive metabolic panel;  Future  -     Hemoglobin A1C    Obesity affecting pregnancy in third trimester  -     insulin glargine (Lantus SoloStar) 100 units/mL injection pen; Inject 20 Units under the skin daily at bedtime At 9 PM daily  To be titrated  GDM  -     Insulin Pen Needle 31G X 5 MM MISC; Inject under the skin daily at bedtime Use one a day or as directed  -     Hemoglobin A1C; Standing  -     Comprehensive metabolic panel; Future  -     Hemoglobin A1C    BMI 35 0-35 9,adult  -     insulin glargine (Lantus SoloStar) 100 units/mL injection pen; Inject 20 Units under the skin daily at bedtime At 9 PM daily  To be titrated  GDM  -     Insulin Pen Needle 31G X 5 MM MISC; Inject under the skin daily at bedtime Use one a day or as directed  -     Hemoglobin A1C; Standing  -     Comprehensive metabolic panel; Future  -     Hemoglobin A1C    Hyperglycemia in pregnancy  -     insulin glargine (Lantus SoloStar) 100 units/mL injection pen; Inject 20 Units under the skin daily at bedtime At 9 PM daily  To be titrated  GDM  -     Insulin Pen Needle 31G X 5 MM MISC; Inject under the skin daily at bedtime Use one a day or as directed  -     Hemoglobin A1C; Standing  -     Comprehensive metabolic panel; Future  -     Hemoglobin A1C      Insulin pen education with returned demonstration without difficulty  Subjective:      Patient ID: Milton Guillermo is a 27 y o  female , 32 4/7 weeks gestation GDM  Eating 3 meals and 2 snacks a day  SMBG 4 times a day  Positive fetal movement       The following portions of the patient's history were reviewed and updated as appropriate: allergies, current medications, past family history, past medical history, past social history, past surgical history and problem list     No Known Allergies  Current Outpatient Medications on File Prior to Visit   Medication Sig Dispense Refill    Blood Glucose Monitoring Suppl (OneTouch Verio Flex System) w/Device KIT Test 4 times daily 1 kit 0    OneTouch Delica Lancets 14C MISC Test 4 times daily 100 each 4    OneTouch Verio test strip Test 4 times daily 100 strip 4    Prenatal Multivit-Min-Fe-FA (PRE-BIMAL FORMULA PO) Take by mouth       [DISCONTINUED] doxylamine (UNISON) 25 MG tablet Take 1 tablet (25 mg total) by mouth daily at bedtime as needed for sleep or nausea (Patient not taking: Reported on 3/24/2021) 30 tablet 0    [DISCONTINUED] pyridoxine (B-6) 25 MG tablet Take 1 tablet (25 mg total) by mouth daily (Patient not taking: Reported on 3/24/2021) 30 tablet 3    [DISCONTINUED] sertraline (ZOLOFT) 25 mg tablet Take 1 tablet (25 mg total) by mouth daily (Patient not taking: Reported on 3/24/2021) 30 tablet 3     No current facility-administered medications on file prior to visit  Review of Systems   Constitutional: Negative for fatigue and fever  HENT: Negative for trouble swallowing  Eyes: Negative for visual disturbance  Respiratory: Negative for cough and shortness of breath  Cardiovascular: Negative for chest pain and palpitations  Gastrointestinal: Negative for constipation, diarrhea and vomiting  Endocrine: Negative for polydipsia, polyphagia and polyuria  Genitourinary: Negative for difficulty urinating and vaginal bleeding  Neurological: Negative for headaches  Psychiatric/Behavioral: Negative for sleep disturbance  Objective:    No results found for: HGBA1C   /59   Pulse 89   Ht 5' 1" (1 549 m)   Wt 85 kg (187 lb 6 4 oz)   LMP 2020 (Exact Date)   BMI 35 41 kg/m²          Physical Exam  HENT:      Head: Normocephalic  Eyes:      Conjunctiva/sclera: Conjunctivae normal    Cardiovascular:      Rate and Rhythm: Normal rate and regular rhythm  Heart sounds: Normal heart sounds  Pulmonary:      Effort: Pulmonary effort is normal    Musculoskeletal:         General: Normal range of motion  Cervical back: Normal range of motion     Neurological:      Mental Status: She is alert and oriented to person, place, and time    Psychiatric:         Mood and Affect: Mood normal          Behavior: Behavior normal          Thought Content:  Thought content normal          Judgment: Judgment normal              Time in:1:05 PM  Time out:2:00 PM

## 2021-07-29 NOTE — ASSESSMENT & PLAN NOTE
-Check A1c and CMP  -A1c goal 5 6% or less  -Due to FBS>90, start Lantus 20 units at 9 or 10 PM daily   -Add 3 AM check until next report on Monday, 8/2/21   -Continue GDM diet and SMBG  -Report on Monday, 8/2/21    -Glucose goals fasting 60-90; 2 hours post meal 120 or less and overnight   -Always have glucose available to treat hypoglycemia, use 15 by 15 rule  -Walk up to 30 minutes a day  -Stay in close contact with diabetes team   -Fetal growth ultrasound every 4 weeks or as recommended  Starting at 32 weeks gestation, NST twice a week and JAYLA weekly   -Continue follow up with OB and MFM as recommended      No results found for: HGBA1C

## 2021-08-02 ENCOUNTER — PATIENT OUTREACH (OUTPATIENT)
Dept: OBGYN CLINIC | Facility: CLINIC | Age: 26
End: 2021-08-02

## 2021-08-02 ENCOUNTER — ROUTINE PRENATAL (OUTPATIENT)
Dept: OBGYN CLINIC | Facility: CLINIC | Age: 26
End: 2021-08-02

## 2021-08-02 VITALS
HEART RATE: 91 BPM | WEIGHT: 187 LBS | DIASTOLIC BLOOD PRESSURE: 64 MMHG | HEIGHT: 61 IN | BODY MASS INDEX: 35.3 KG/M2 | SYSTOLIC BLOOD PRESSURE: 116 MMHG

## 2021-08-02 DIAGNOSIS — O99.213 OBESITY AFFECTING PREGNANCY IN THIRD TRIMESTER: ICD-10-CM

## 2021-08-02 DIAGNOSIS — F32.A DEPRESSION DURING PREGNANCY IN THIRD TRIMESTER: Primary | ICD-10-CM

## 2021-08-02 DIAGNOSIS — Z3A.32 32 WEEKS GESTATION OF PREGNANCY: ICD-10-CM

## 2021-08-02 DIAGNOSIS — Z34.93 THIRD TRIMESTER PREGNANCY: ICD-10-CM

## 2021-08-02 DIAGNOSIS — O24.419 GESTATIONAL DIABETES MELLITUS (GDM) IN THIRD TRIMESTER, GESTATIONAL DIABETES METHOD OF CONTROL UNSPECIFIED: ICD-10-CM

## 2021-08-02 DIAGNOSIS — O99.343 DEPRESSION DURING PREGNANCY IN THIRD TRIMESTER: Primary | ICD-10-CM

## 2021-08-02 PROBLEM — Z33.2: Status: ACTIVE | Noted: 2021-03-05

## 2021-08-02 PROCEDURE — PNV: Performed by: OBSTETRICS & GYNECOLOGY

## 2021-08-02 NOTE — PROGRESS NOTES
Hailee Barboza presents today for routine OB visit at [address] [place] [address]  S:  She reports no complains today  Denies uterine contractions  Denies vaginal bleeding or leaking of fluid  Reports adequate fetal movement of at least 10 movements in 2 hours once daily  She was recently started on Lantus 20U for elevated fasting glucose in the setting of gestational diabetes  She reports compliance with fingersticks  O:  Blood Pressure: 116/64  Wt=84 8 kg (187 lb); Body mass index is 35 33 kg/m²  Fundal height 35cm    No results found for this or any previous visit (from the past 24 hour(s))  Reason for NST: Gestational Diabetes  Baseline: 145 BPM  Variability: Moderate  Accelerations: Yes  Decelerations: None    JAYLA (If Indicated) (cm): 17 cm  Uterine Irritability: No  Contractions: Not present    Pulm: Non-labored breathing  Abdomen: gravid, soft, non-tender  Extremities: non-tender, no edema  A/P:  Gestational diabetes mellitus (GDM) in third trimester  Following with Diabetes Education  Lantus 20U currently   Continue fingersticks as instructed: fasting, 2-hr PP, 3am      32 weeks gestation of pregnancy  NST today reactive with 15x15 accelerations, no decelerations  Next ultrasound   Continue daily kick counts   labor precautions reviewed  Twice weekly NST, weekly JAYLA  Return to office on Thursday for NST      Patient discussed with Dr Alica Client Denese C Mathilda Gitelman, MD  PGY II, OB/GYN  2021, 2:46 PM

## 2021-08-02 NOTE — PROGRESS NOTES
SABRINA WASHINGTON met with pt for follow up  Pt reported she is doing well  Denies depression signs  Pt states works is going well  Issues at home are better  Pt reported is expecting a baby boy  Pt is getting ready with baby items  Pt coping well with Gestational Diabetes Dx  Pt denies other concern at this time  SABRINA priovided brief supportive counseling  Pt will call SABRINA WASHINGTON at any time needed

## 2021-08-02 NOTE — ASSESSMENT & PLAN NOTE
NST today reactive with 15x15 accelerations, no decelerations  Next ultrasound   Continue daily kick counts   labor precautions reviewed  Twice weekly NST, weekly JAYLA  Return to office on Thursday for NST

## 2021-08-02 NOTE — ASSESSMENT & PLAN NOTE
Following with Diabetes Education  Lantus 20U currently   Continue fingersticks as instructed: fasting, 2-hr PP, 3am

## 2021-08-05 ENCOUNTER — ROUTINE PRENATAL (OUTPATIENT)
Dept: OBGYN CLINIC | Facility: CLINIC | Age: 26
End: 2021-08-05

## 2021-08-05 ENCOUNTER — DOCUMENTATION (OUTPATIENT)
Dept: PERINATAL CARE | Facility: CLINIC | Age: 26
End: 2021-08-05

## 2021-08-05 VITALS
DIASTOLIC BLOOD PRESSURE: 68 MMHG | SYSTOLIC BLOOD PRESSURE: 103 MMHG | HEART RATE: 91 BPM | WEIGHT: 186 LBS | HEIGHT: 61 IN | BODY MASS INDEX: 35.12 KG/M2

## 2021-08-05 DIAGNOSIS — O24.419 GESTATIONAL DIABETES MELLITUS (GDM) IN THIRD TRIMESTER, GESTATIONAL DIABETES METHOD OF CONTROL UNSPECIFIED: Primary | ICD-10-CM

## 2021-08-05 DIAGNOSIS — O24.410 DIET CONTROLLED GESTATIONAL DIABETES MELLITUS (GDM) IN THIRD TRIMESTER: ICD-10-CM

## 2021-08-05 PROCEDURE — PNV: Performed by: STUDENT IN AN ORGANIZED HEALTH CARE EDUCATION/TRAINING PROGRAM

## 2021-08-05 RX ORDER — BLOOD SUGAR DIAGNOSTIC
STRIP MISCELLANEOUS
Qty: 100 STRIP | Refills: 4 | Status: SHIPPED | OUTPATIENT
Start: 2021-08-05 | End: 2021-09-17 | Stop reason: HOSPADM

## 2021-08-05 NOTE — PROGRESS NOTES
Date:  21  RE: Earlene Dubin    : 1995  Estimated Date of Delivery: 21  EGA: 32w4d  Referring Provider: CARLY Che         Plan:  Increase Lantus to 24 units at bedtime   -Patient aware CRNP may increase over 20%, if this changes will call patient with update     -Patient needs test strips refilled, pharmacy stating it is too soon due to checking BS at 3 am since starting Lantus  -Attempted to call pharmacy but they do not open until after 9 am     -Patient was unable to check blood sugars on 2021 since she ran out of test strips     -Lantus 20 units started on 2021 by CARLY visit    Diet:  Gestational diabetes meal plan; 3 meals and 3 snacks     -bedtime snack to 1 serving CHO and 2-3 ounces of protein  SMBG: Checks blood sugar 4 times per day; fasting and 2 hour start of each meal    Meter: One Touch Verio  glucose meter  Activity: Walks 30 minutes daily, follow OB recommendations  Support System: Significant Other  Patient Goal: I will walk 20-30 minutes after dinner daily      Labs  Labs ordered 2021, needs to complet    Ultrasounds  21 US: fetal growth and JAYLA appeared normal  Next US scheduled for 2021    Further fetal surveillance  NST / JAYLA twice a week at [facility] office and IRAIDA Stevens RN

## 2021-08-05 NOTE — PROGRESS NOTES
OB/GYN prenatal visit    S: 27 y o  Claudia Shannon here for NST 2/2 to A2GDM  Patient had visit this week with reactive NST and JAYLA 17cm  Denies ctx, LoF, VB  +FM  Saw diabetes educator today  Fasting mostly above 100  Increased to 24U lantus  Discussed importance of glucose management  Follow up growth scan scheduled for 8/11       O:  Vitals:    08/05/21 0916   BP: 103/68   Pulse: 91       Gen: no acute distress, nonlabored breathing      NST  Baseline 140, moderate variability   Positive accels, no decels   No contractions noted on the monitor   Reactive NST       A/P:    A2GDM   On lantus 24U, recently increased   Fasting sugars >90   Discussed importance of glucose control  NST reactive   Continue 2 weekly NST and weekly JAYLA       Marcel Morales MD  8/5/2021  10:11 AM

## 2021-08-09 ENCOUNTER — ROUTINE PRENATAL (OUTPATIENT)
Dept: OBGYN CLINIC | Facility: CLINIC | Age: 26
End: 2021-08-09

## 2021-08-09 VITALS
HEIGHT: 61 IN | WEIGHT: 188 LBS | SYSTOLIC BLOOD PRESSURE: 119 MMHG | BODY MASS INDEX: 35.5 KG/M2 | DIASTOLIC BLOOD PRESSURE: 68 MMHG | HEART RATE: 94 BPM

## 2021-08-09 DIAGNOSIS — Z3A.33 33 WEEKS GESTATION OF PREGNANCY: Primary | ICD-10-CM

## 2021-08-09 DIAGNOSIS — O24.414 INSULIN CONTROLLED GESTATIONAL DIABETES MELLITUS (GDM) IN THIRD TRIMESTER: ICD-10-CM

## 2021-08-09 DIAGNOSIS — Z34.93 THIRD TRIMESTER PREGNANCY: ICD-10-CM

## 2021-08-09 PROCEDURE — PNV: Performed by: OBSTETRICS & GYNECOLOGY

## 2021-08-09 NOTE — ASSESSMENT & PLAN NOTE
Lantus increased to 26u on 8/5 however patient reporting that she was not aware of this change and she did have test strips to check her sugars for the last week  Refills confirmed to be available at pharmacy  Discussed with [facility], patient is to increase Lantus to 26u today and send in sugar readings on Thursday 8/12/2021  AFS initiated

## 2021-08-09 NOTE — PROGRESS NOTES
OB/GYN  PN Visit  Yane Pineda  [id number]  8/9/2021  9:47 AM  Dr Freeda Schirmer, MD    S: 27 y o  [id number] 33w1d here for PN visit  OB complaints:  Ctxns: no  Leakage: no  Bleeding: no  Fetal movement: yes    She reports that she is feeling well  She has no complaints at this time       O:  Vitals:    08/09/21 0932   BP: 119/68   Pulse: 94       Gen: no acute distress, nonlabored breathing  Fundal height: 36cm  NST: reactive  JAYLA:14 98cm       Presentation: Vertex    A/P:    Problem List Items Addressed This Visit        Endocrine    Gestational diabetes mellitus (GDM) in third trimester     Lantus increased to 26u on 8/5 however patient reporting that she was not aware of this change and she did have test strips to check her sugars for the last week  Refills confirmed to be available at pharmacy  Discussed with [facility], patient is to increase Lantus to 26u today and send in sugar readings on Thursday 8/12/2021  AFS initiated            Other    33 weeks gestation of pregnancy - Primary     Growth ultrasound scheduled for 8/11/2021  Labor precautions given  RTC in 1 week for NST/JAYLA           Third trimester pregnancy                Freeda Schirmer, MD  8/9/2021  9:47 AM

## 2021-08-11 ENCOUNTER — ULTRASOUND (OUTPATIENT)
Dept: PERINATAL CARE | Facility: CLINIC | Age: 26
End: 2021-08-11
Payer: COMMERCIAL

## 2021-08-11 VITALS
BODY MASS INDEX: 35.5 KG/M2 | HEART RATE: 100 BPM | DIASTOLIC BLOOD PRESSURE: 60 MMHG | SYSTOLIC BLOOD PRESSURE: 121 MMHG | WEIGHT: 188 LBS | HEIGHT: 61 IN

## 2021-08-11 DIAGNOSIS — Z3A.33 33 WEEKS GESTATION OF PREGNANCY: ICD-10-CM

## 2021-08-11 DIAGNOSIS — O36.63X0 MACROSOMIA OF FETUS AFFECTING MANAGEMENT OF MOTHER IN THIRD TRIMESTER, SINGLE OR UNSPECIFIED FETUS: ICD-10-CM

## 2021-08-11 DIAGNOSIS — O24.414 INSULIN CONTROLLED GESTATIONAL DIABETES MELLITUS (GDM) IN THIRD TRIMESTER: Primary | ICD-10-CM

## 2021-08-11 PROCEDURE — 76816 OB US FOLLOW-UP PER FETUS: CPT | Performed by: OBSTETRICS & GYNECOLOGY

## 2021-08-11 PROCEDURE — 99214 OFFICE O/P EST MOD 30 MIN: CPT | Performed by: OBSTETRICS & GYNECOLOGY

## 2021-08-11 NOTE — PROGRESS NOTES
Jessica Dover has no complaints today  She reports regular fetal movements and does not report any problems  She is here today at 33w3d for an ultrasound for fetal growth secondary to gestational diabetes currently managed on Lantus 26 units at nighttime  This was a recent increase that she has completed over the last 2 evenings and her fasting blood sugar this morning was 89  Her last pregnancy weighed 7 pounds 13 8 ounces at 40 weeks  This pregnancy has a different father named Lacey Campbell who was 9 pounds 13 ounces at birth  She has not received the COVID vaccine yet  Ultrasound findings: The ultrasound today shows a fetus in the 91st percentile that appears symmetric and growing almost 2 weeks ahead of dates  The amniotic fluid appears normal in amount  The prior missed anatomy was seen today and appears normal     Pregnancy ultrasound has limitations and is unable to detect all forms of fetal congenital abnormalities  The inaccuracy in the EFW can be off by 1 lb either way in the third trimester  Specific counseling was provided on the following problems:  1  I recommend the Covid vaccine in pregnancy  The benefits of COVID vaccination in pregnancy is to decrease the mothers risk of developing a Covid infection  A severe Covid infection in pregnancy can cause an increased risk for a  delivery  The vaccine also benefits the baby in that the same antibodies she develops can cross the placenta and cross her breast milk to give the baby some protection after birth  Levels of antibodies developed after a vaccine are higher than those that develop after a natural infection thus providing even further benefit to the baby  She is planning on completing the COVID vaccine and was given instructions on how to find site in the [employer] system to obtain her 1st dose    2    I suspect her large baby found today is due to family genetics based on the symmetry of the measurements and the fact that there is no evidence for polyhydramnios  Her last NST was on August 9th and she has another NST tomorrow in her OB office  Follow up recommended:   1  Recommend a follow-up ultrasound in 4 weeks for growth  2  Recommend she continue twice weekly NST and weekly JAYLA locally  Pre visit time reviewing her records    5 minutes  Face to face time  10 minutes  Post visit time on documentation of note, updating her problem list, adding orders and prescriptions 5 minutes  Procedures that were completed today were charged separately  The level of decision making was low level complexity      Diane Agarwal MD

## 2021-08-11 NOTE — LETTER
2021     Francetta Snellen, [address] [address]    Patient: Olive Rodriguez   YOB: 1995   Date of Visit: 2021       Dear Dr Nathan Taylor: Thank you for referring Olive Rodriguez to me for evaluation  Below are my notes for this consultation  If you have questions, please do not hesitate to call me  I look forward to following your patient along with you  Sincerely,        Dar Wiseman MD        CC: No Recipients  Dar Wiseman MD  2021 11:11 AM  Sign when Signing Visit  Olive Rodriguez has no complaints today  She reports regular fetal movements and does not report any problems  She is here today at 33w3d for an ultrasound for fetal growth secondary to gestational diabetes currently managed on Lantus 26 units at nighttime  This was a recent increase that she has completed over the last 2 evenings and her fasting blood sugar this morning was 89  Her last pregnancy weighed 7 pounds 13 8 ounces at 40 weeks  This pregnancy has a different father named Dragan Cruz who was 9 pounds 13 ounces at birth  She has not received the COVID vaccine yet  Ultrasound findings: The ultrasound today shows a fetus in the 91st percentile that appears symmetric and growing almost 2 weeks ahead of dates  The amniotic fluid appears normal in amount  The prior missed anatomy was seen today and appears normal     Pregnancy ultrasound has limitations and is unable to detect all forms of fetal congenital abnormalities  The inaccuracy in the EFW can be off by 1 lb either way in the third trimester  Specific counseling was provided on the following problems:  1  I recommend the Covid vaccine in pregnancy  The benefits of COVID vaccination in pregnancy is to decrease the mothers risk of developing a Covid infection  A severe Covid infection in pregnancy can cause an increased risk for a  delivery    The vaccine also benefits the baby in that the same antibodies she develops can cross the placenta and cross her breast milk to give the baby some protection after birth  Levels of antibodies developed after a vaccine are higher than those that develop after a natural infection thus providing even further benefit to the baby  She is planning on completing the COVID vaccine and was given instructions on how to find site in the [employer] system to obtain her 1st dose  2    I suspect her large baby found today is due to family genetics based on the symmetry of the measurements and the fact that there is no evidence for polyhydramnios  Her last NST was on August 9th and she has another NST tomorrow in her OB office  Follow up recommended:   1  Recommend a follow-up ultrasound in 4 weeks for growth  2  Recommend she continue twice weekly NST and weekly JAYLA locally  Pre visit time reviewing her records    5 minutes  Face to face time  10 minutes  Post visit time on documentation of note, updating her problem list, adding orders and prescriptions 5 minutes  Procedures that were completed today were charged separately  The level of decision making was low level complexity      Sabrina Holland MD

## 2021-08-12 ENCOUNTER — ROUTINE PRENATAL (OUTPATIENT)
Dept: OBGYN CLINIC | Facility: CLINIC | Age: 26
End: 2021-08-12

## 2021-08-12 VITALS
DIASTOLIC BLOOD PRESSURE: 71 MMHG | HEIGHT: 61 IN | BODY MASS INDEX: 35.12 KG/M2 | HEART RATE: 87 BPM | SYSTOLIC BLOOD PRESSURE: 115 MMHG | WEIGHT: 186 LBS

## 2021-08-12 DIAGNOSIS — Z36.89 NST (NON-STRESS TEST) REACTIVE ON FETAL SURVEILLANCE: Primary | ICD-10-CM

## 2021-08-12 PROCEDURE — PNV: Performed by: OBSTETRICS & GYNECOLOGY

## 2021-08-12 NOTE — PROGRESS NOTES
Fetal Surveillance  Paulino Cai is a 25y/o  @ 33w4d who presents today for AFS, indication of A2GDM  She is currently on Lantus 26u qhs  She has no complaints this morning       Vitals:    21 0800   BP: 115/71   Pulse: 87     NST: reactive, moderate variability, accelerations, no decelerations  [employer]: no contractions    - RTC for NST/JAYLA in one week      Ursula Rocha MD, PGY-4  2021  8:59 AM

## 2021-08-13 ENCOUNTER — TELEPHONE (OUTPATIENT)
Dept: PERINATAL CARE | Facility: CLINIC | Age: 26
End: 2021-08-13

## 2021-08-16 ENCOUNTER — ROUTINE PRENATAL (OUTPATIENT)
Dept: OBGYN CLINIC | Facility: CLINIC | Age: 26
End: 2021-08-16

## 2021-08-16 VITALS
HEART RATE: 85 BPM | HEIGHT: 61 IN | WEIGHT: 189 LBS | SYSTOLIC BLOOD PRESSURE: 109 MMHG | BODY MASS INDEX: 35.68 KG/M2 | DIASTOLIC BLOOD PRESSURE: 55 MMHG

## 2021-08-16 DIAGNOSIS — Z34.93 THIRD TRIMESTER PREGNANCY: ICD-10-CM

## 2021-08-16 DIAGNOSIS — O99.213 OBESITY AFFECTING PREGNANCY IN THIRD TRIMESTER: ICD-10-CM

## 2021-08-16 DIAGNOSIS — O36.63X0 MACROSOMIA OF FETUS AFFECTING MANAGEMENT OF MOTHER IN THIRD TRIMESTER, SINGLE OR UNSPECIFIED FETUS: ICD-10-CM

## 2021-08-16 DIAGNOSIS — O24.414 INSULIN CONTROLLED GESTATIONAL DIABETES MELLITUS (GDM) IN THIRD TRIMESTER: Primary | ICD-10-CM

## 2021-08-16 DIAGNOSIS — Z3A.34 34 WEEKS GESTATION OF PREGNANCY: ICD-10-CM

## 2021-08-16 PROCEDURE — PNV: Performed by: OBSTETRICS & GYNECOLOGY

## 2021-08-16 NOTE — PROGRESS NOTES
Makeda Connor presents today for routine OB visit at 34w1d  S:  She reports no complaints today  Denies uterine contractions  Denies vaginal bleeding or leaking of fluid  Reports adequate fetal movement of at least 10 movements in 2 hours once daily  O:  Blood Pressure: 109/55  Wt=85 7 kg (189 lb); Body mass index is 35 71 kg/m²  Fetal Heart Rate: 125; Fundal Height (cm): 36 cm    Pulm: Non-labored breathing  Abdomen: gravid, soft, non-tender  Extremities: non-tender, no edema  A/P:  Problem List        Endocrine    Insulin controlled gestational diabetes mellitus (GDM) in third trimester    Overview     1 hour GTT of 207  Continues to have elevated fasting glucose >95  Titration of insulin per Diabetes Educators  Biweekly NST Mon/Thur, JAYLA          Hyperglycemia in pregnancy       Other    34 weeks gestation of pregnancy    Overview     Last Ultrasound 21: EFW [id number] (91%)  S/p TDAP vaccine 21  Delivery consent signed 21  Contraception: Micronor  Breastfeeding: yes  Birth plan: declines epidural  Discussed  labor precautions and fetal kick counts    Continue prenatal vitamins   Return to clinic for NST in 3 days           Third trimester pregnancy    Macrosomia affecting management of mother in third trimester    Overview     Next US scheduled for 21  FOB was 9 pounds 13 ounces at birth  Ultrasound shows a fetus in the 91st percentile at 29 weeks  Fetus is symmetric and JAYLA is normal suggesting fetal size is likely secondary to family genetics               Patient discussed with Dr Harish Umaña MD  PGY II, OB/GYN  2021, 1:57 PM

## 2021-08-16 NOTE — PATIENT INSTRUCTIONS
Kick Counts in Pregnancy   WHAT YOU NEED TO KNOW:   Kick counts measure how much your baby is moving in your womb  A kick from your baby can be felt as a twist, turn, swish, roll, or jab  It is common to feel your baby kicking at 26 to 28 weeks of pregnancy  You may feel your baby kick as early as 20 weeks of pregnancy  You may want to start counting at 28 weeks  DISCHARGE INSTRUCTIONS:   Contact your healthcare provider immediately if:   · You feel a change in the number of kicks or movements of your baby  · You feel fewer than 10 kicks within 2 hours  · You have questions or concerns about your baby's movements  Why measure kick counts:  Your baby's movement may provide information about your baby's health  He or she may move less, or not at all, if there are problems  Your baby may move less if he or she is not getting enough oxygen or nutrition from the placenta  Do not smoke while you are pregnant  Smoking decreases the amount of oxygen that gets to your baby  Talk to your healthcare provider if you need help to quit smoking  Tell your healthcare provider as soon as you feel a change in your baby's movements  When to measure kick counts:   · Measure kick counts at the same time every day  · Measure kick counts when your baby is awake and most active  Your baby may be most active in the evening  How to measure kick counts:  Check that your baby is awake before you measure kick counts  You can wake up your baby by lightly pushing on your belly, walking, or drinking something cold  Your healthcare provider may tell you different ways to measure kick counts  You may be told to do the following:  · Use a chart or clock to keep track of the time you start and finish counting  · Sit in a chair or lie on your left side  · Place your hands on the largest part of your belly  · Count until you reach 10 kicks  Write down how much time it takes to count 10 kicks       · It may take 30 minutes to 2 hours to count 10 kicks  It should not take more than 2 hours to count 10 kicks  Follow up with your healthcare provider as directed:  Write down your questions so you remember to ask them during your visits  © Copyright [employer] 2021 Information is for End User's use only and may not be sold, redistributed or otherwise used for commercial purposes  All illustrations and images included in CareNotes® are the copyrighted property of [employer]  or Dalia Wills   The above information is an  only  It is not intended as medical advice for individual conditions or treatments  Talk to your doctor, nurse or pharmacist before following any medical regimen to see if it is safe and effective for you

## 2021-08-19 ENCOUNTER — ROUTINE PRENATAL (OUTPATIENT)
Dept: OBGYN CLINIC | Facility: CLINIC | Age: 26
End: 2021-08-19

## 2021-08-19 VITALS
SYSTOLIC BLOOD PRESSURE: 122 MMHG | WEIGHT: 189 LBS | HEIGHT: 61 IN | BODY MASS INDEX: 35.68 KG/M2 | HEART RATE: 93 BPM | DIASTOLIC BLOOD PRESSURE: 66 MMHG

## 2021-08-19 DIAGNOSIS — Z34.93 THIRD TRIMESTER PREGNANCY: Primary | ICD-10-CM

## 2021-08-19 LAB
SL AMB  POCT GLUCOSE, UA: NORMAL
SL AMB POCT KETONES,UA: NORMAL
SL AMB POCT URINE PROTEIN: NORMAL

## 2021-08-19 PROCEDURE — PNV: Performed by: STUDENT IN AN ORGANIZED HEALTH CARE EDUCATION/TRAINING PROGRAM

## 2021-08-19 PROCEDURE — 81002 URINALYSIS NONAUTO W/O SCOPE: CPT | Performed by: STUDENT IN AN ORGANIZED HEALTH CARE EDUCATION/TRAINING PROGRAM

## 2021-08-19 PROCEDURE — 76815 OB US LIMITED FETUS(S): CPT | Performed by: STUDENT IN AN ORGANIZED HEALTH CARE EDUCATION/TRAINING PROGRAM

## 2021-08-19 PROCEDURE — 59025 FETAL NON-STRESS TEST: CPT | Performed by: STUDENT IN AN ORGANIZED HEALTH CARE EDUCATION/TRAINING PROGRAM

## 2021-08-19 NOTE — PROGRESS NOTES
Patient is a  at 34w4d who presents for an NST secondary to A2GDM  NST with a baseline 145 BPM and reactive with overall moderate variability  There were two areas that appeared to be possible variable decelerations, but were right after brief periods of marked variability with fetal movement  An JAYLA was performed with a total 15 cm  Overall reassuring and low suspicion for any fetal compromise  Patient will return on  for PNV, NST, and JAYLA      D/w Dr Vick Bower

## 2021-08-25 ENCOUNTER — DOCUMENTATION (OUTPATIENT)
Dept: PERINATAL CARE | Facility: CLINIC | Age: 26
End: 2021-08-25

## 2021-08-25 NOTE — PROGRESS NOTES
Date:  21  RE: Benjamin Weber    : 1995  Estimated Date of Delivery: 21  EGA: 35w3d  Referring Provider: CARLY Quiros       Plan:  Increase Lantus to 30 units at bedtime   Diet:  Gestational diabetes meal plan; 3 meals and 3 snacks     -bedtime snack to 1 serving CHO and 2-3 ounces of protein  SMBG: Checks blood sugar 4 times per day; fasting and 2 hour start of each meal    Meter: One Touch Verio  glucose meter  Activity: Walks 30 minutes daily, follow OB recommendations  Support System: Significant Other  Patient Goal: I will walk 20-30 minutes after dinner daily      Labs  Labs ordered 2021, needs to complete    Ultrasounds  21 US: fetal growth and JAYLA appeared normal  2021 Normal JAYLA - growth 91st percentile   Next US scheduled for 2021    Further fetal surveillance  NST / JAYLA twice a week at [place] office and IRAIDA Alcocer RN

## 2021-08-27 ENCOUNTER — ULTRASOUND (OUTPATIENT)
Dept: PERINATAL CARE | Facility: CLINIC | Age: 26
End: 2021-08-27
Payer: COMMERCIAL

## 2021-08-27 ENCOUNTER — TELEPHONE (OUTPATIENT)
Dept: PERINATAL CARE | Facility: CLINIC | Age: 26
End: 2021-08-27

## 2021-08-27 VITALS
HEIGHT: 61 IN | HEART RATE: 91 BPM | BODY MASS INDEX: 35.04 KG/M2 | SYSTOLIC BLOOD PRESSURE: 136 MMHG | DIASTOLIC BLOOD PRESSURE: 76 MMHG | WEIGHT: 185.6 LBS

## 2021-08-27 DIAGNOSIS — O24.414 INSULIN CONTROLLED GESTATIONAL DIABETES MELLITUS (GDM) IN THIRD TRIMESTER: ICD-10-CM

## 2021-08-27 DIAGNOSIS — Z3A.35 35 WEEKS GESTATION OF PREGNANCY: Primary | ICD-10-CM

## 2021-08-27 PROCEDURE — 76815 OB US LIMITED FETUS(S): CPT | Performed by: OBSTETRICS & GYNECOLOGY

## 2021-08-27 PROCEDURE — 59025 FETAL NON-STRESS TEST: CPT | Performed by: OBSTETRICS & GYNECOLOGY

## 2021-08-27 NOTE — LETTER
NST sleeve cover sheet    Patient name: Porsche Landis  : 1995  MRN: [id number]    SILVIA: Estimated Date of Delivery: 21    Obstetrician: _______________________________    Reason(s) for testing:  __________________________________________      Testing frequency:    ___ 2x/wk  ___ 1x/wk  ___ Dopplers  ___ BPP?       Last growth scan: __________________________________________

## 2021-08-27 NOTE — TELEPHONE ENCOUNTER
Checked patient out today at the [place] office  She will call [facility] to schedule her next nst/az for next week

## 2021-08-27 NOTE — LETTER
August 27, 2021     [address] PROVIDER    Patient: Talha Braswell   YOB: 1995   Date of Visit: 8/27/2021       Dear   Provider: Thank you for referring Talha Braswell to me for evaluation  Below are my notes for this consultation  If you have questions, please do not hesitate to call me  I look forward to following your patient along with you  Sincerely,        Alfreda Vazquez MD        CC: No Recipients  Alfreda Vazquez MD  8/26/2021  4:56 PM  Sign when Signing Visit   Please refer to the [facility] ultrasound report in Ob Procedures for additional information regarding today's visit

## 2021-08-27 NOTE — PATIENT INSTRUCTIONS
Kick Counts in Pregnancy   AMBULATORY CARE:   Kick counts  measure how much your baby is moving in your womb  A kick from your baby can be felt as a twist, turn, swish, roll, or jab  It is common to feel your baby kicking at 26 to 28 weeks of pregnancy  You may feel your baby kick as early as 20 weeks of pregnancy  You may want to start counting at 28 weeks  Contact your healthcare provider immediately if:   · You feel a change in the number of kicks or movements of your baby  · You feel fewer than 10 kicks within 2 hours  · You have questions or concerns about your baby's movements  Why measure kick counts:  Your baby's movement may provide information about your baby's health  He or she may move less, or not at all, if there are problems  Your baby may move less if he or she is not getting enough oxygen or nutrition from the placenta  Do not smoke while you are pregnant  Smoking decreases the amount of oxygen that gets to your baby  Talk to your healthcare provider if you need help to quit smoking  Tell your healthcare provider as soon as you feel a change in your baby's movements  When to measure kick counts:   · Measure kick counts at the same time every day  · Measure kick counts when your baby is awake and most active  Your baby may be most active in the evening  How to measure kick counts:  Check that your baby is awake before you measure kick counts  You can wake up your baby by lightly pushing on your belly, walking, or drinking something cold  Your healthcare provider may tell you different ways to measure kick counts  You may be told to do the following:  · Use a chart or clock to keep track of the time you start and finish counting  · Sit in a chair or lie on your left side  · Place your hands on the largest part of your belly  · Count until you reach 10 kicks  Write down how much time it takes to count 10 kicks  · It may take 30 minutes to 2 hours to count 10 kicks  It should not take more than 2 hours to count 10 kicks  Follow up with your healthcare provider as directed:  Write down your questions so you remember to ask them during your visits  © Copyright [employer] 2021 Information is for End User's use only and may not be sold, redistributed or otherwise used for commercial purposes  All illustrations and images included in CareNotes® are the copyrighted property of A [employer] A M , Inc  or [facility] Valeri Wills   The above information is an  only  It is not intended as medical advice for individual conditions or treatments  Talk to your doctor, nurse or pharmacist before following any medical regimen to see if it is safe and effective for you

## 2021-08-29 ENCOUNTER — APPOINTMENT (EMERGENCY)
Dept: RADIOLOGY | Facility: HOSPITAL | Age: 26
End: 2021-08-29
Payer: COMMERCIAL

## 2021-08-29 ENCOUNTER — HOSPITAL ENCOUNTER (EMERGENCY)
Facility: HOSPITAL | Age: 26
Discharge: HOME | End: 2021-08-29
Attending: EMERGENCY MEDICINE | Admitting: EMERGENCY MEDICINE
Payer: COMMERCIAL

## 2021-08-29 VITALS
TEMPERATURE: 98.6 F | RESPIRATION RATE: 20 BRPM | SYSTOLIC BLOOD PRESSURE: 121 MMHG | OXYGEN SATURATION: 97 % | DIASTOLIC BLOOD PRESSURE: 85 MMHG | HEART RATE: 93 BPM

## 2021-08-29 DIAGNOSIS — M25.571 ACUTE RIGHT ANKLE PAIN: Primary | ICD-10-CM

## 2021-08-29 PROCEDURE — 99284 EMERGENCY DEPT VISIT MOD MDM: CPT | Performed by: EMERGENCY MEDICINE

## 2021-08-29 PROCEDURE — 99283 EMERGENCY DEPT VISIT LOW MDM: CPT

## 2021-08-29 PROCEDURE — 73610 X-RAY EXAM OF ANKLE: CPT

## 2021-08-29 RX ORDER — ACETAMINOPHEN 325 MG/1
975 TABLET ORAL ONCE
Status: COMPLETED | OUTPATIENT
Start: 2021-08-29 | End: 2021-08-29

## 2021-08-29 RX ADMIN — ACETAMINOPHEN 975 MG: 325 TABLET, FILM COATED ORAL at 19:06

## 2021-08-29 NOTE — DISCHARGE INSTRUCTIONS
You have been seen for right ankle pain  Please take tylenol and apply ice as needed for pain and swelling  Return to the emergency department if you develop worsening pain, weakness or any other symptoms of concern  Please follow up with orthopedics by calling the number provided

## 2021-08-29 NOTE — ED PROVIDER NOTES
History  Chief Complaint   Patient presents with    Ankle Injury     pt c/o right ankle pain, pt fell of the side of the curb onto right carolyne, pt stated she fell down to the ground, pt is 36wks pregnant      Audra Knox is a 27y o  year old  female @ 36 weeks gestation presenting to the [facility] ED for right ankle pain  Patient was walkign earlier today when she tripped over curve, inverting right ankle  Patient had immediate onset pain and felt a pop in the ankle  Pain is now intense, nonradiating  Patient unable to bear weight  Patient denies associated weakness/numbness in affected ankle  Patient fell and landed on side, did not strike abdomen  No vaginal bleeding, leakage of fluid, contractions or decreased fetal movement since the fall  The patient has not taken/received any medications at home for relief of symptoms  History provided by:  Patient   used: No    Ankle Injury  Associated symptoms: no abdominal pain, no chest pain, no congestion, no cough, no diarrhea, no fever, no headaches, no nausea, no rash, no rhinorrhea, no shortness of breath and no vomiting        Prior to Admission Medications   Prescriptions Last Dose Informant Patient Reported? Taking? Blood Glucose Monitoring Suppl (OneTouch Verio Flex System) w/Device KIT  Self No No   Sig: Test 4 times daily   Insulin Pen Needle 31G X 5 MM MISC  Self No No   Sig: Inject under the skin daily at bedtime Use one a day or as directed  OneTouch Delica Lancets 94X MISC  Self No No   Sig: Test 4 times daily   OneTouch Verio test strip  Self No No   Sig: USE TO TEST 4 TIMES A DAY   Prenatal Multivit-Min-Fe-FA (PRE- FORMULA PO)  Self Yes No   Sig: Take by mouth    insulin glargine (Lantus SoloStar) 100 units/mL injection pen  Self No No   Sig: Inject 20 Units under the skin daily at bedtime At 9 PM daily  To be titrated  GDM     Patient taking differently: Inject 26 Units under the skin daily at bedtime At 9 PM daily  To be titrated  GDM  Facility-Administered Medications: None       Past Medical History:   Diagnosis Date    Depression     Diabetes, gestational        Past Surgical History:   Procedure Laterality Date    EAR TUBE REMOVAL      WISDOM TOOTH EXTRACTION      Age 10       Family History   Problem Relation Age of Onset    Thyroid disease Mother     No Known Problems Father     No Known Problems Sister     No Known Problems Daughter     No Known Problems Sister     Diabetes Family     Heart disease Family      I have reviewed and agree with the history as documented  E-Cigarette/Vaping    E-Cigarette Use Former User     Comments [employer] 01/2021      E-Cigarette/Vaping Substances    Nicotine No     THC No     CBD No     Flavoring No     Other No     Unknown No      Social History     Tobacco Use    Smoking status: Never Smoker    Smokeless tobacco: Former User   Vaping Use    Vaping Use: Former   Substance Use Topics    Alcohol use: No    Drug use: No        Review of Systems   Constitutional: Negative for chills and fever  HENT: Negative for congestion and rhinorrhea  Respiratory: Negative for cough and shortness of breath  Cardiovascular: Negative for chest pain  Gastrointestinal: Negative for abdominal pain, diarrhea, nausea and vomiting  Endocrine: Negative for polyuria  Genitourinary: Negative for difficulty urinating, dysuria, flank pain, hematuria, vaginal bleeding and vaginal discharge  Musculoskeletal: Positive for arthralgias, gait problem and joint swelling  Skin: Negative for rash  Neurological: Negative for syncope, light-headedness and headaches  Hematological: Does not bruise/bleed easily  Psychiatric/Behavioral: Negative for behavioral problems and confusion  All other systems reviewed and are negative        Physical Exam  ED Triage Vitals [08/29/21 1633]   Temperature Pulse Respirations Blood Pressure SpO2   98 6 °F (37 °C) 93 20 121/85 97 %      Temp Source Heart Rate Source Patient Position - Orthostatic VS BP Location FiO2 (%)   Oral Monitor Lying Left arm --      Pain Score       Worst Possible Pain             Orthostatic Vital Signs  Vitals:    08/29/21 1633   BP: 121/85   Pulse: 93   Patient Position - Orthostatic VS: Lying       Physical Exam  Vitals and nursing note reviewed  Constitutional:       General: She is not in acute distress  Appearance: Normal appearance  She is well-developed  She is not ill-appearing, toxic-appearing or diaphoretic  HENT:      Head: Normocephalic and atraumatic  Nose: No congestion or rhinorrhea  Eyes:      General:         Right eye: No discharge  Left eye: No discharge  Cardiovascular:      Rate and Rhythm: Normal rate and regular rhythm  Pulmonary:      Effort: Pulmonary effort is normal  No accessory muscle usage or respiratory distress  Breath sounds: Normal breath sounds  No stridor  No decreased breath sounds, wheezing, rhonchi or rales  Abdominal:      General: There is distension (gravid)  Palpations: Abdomen is soft  Tenderness: There is no abdominal tenderness  There is no guarding or rebound  Musculoskeletal:      Cervical back: Normal range of motion and neck supple  No rigidity  Right knee: No swelling or bony tenderness  No tenderness  Left knee: No swelling or bony tenderness  No tenderness  Right lower leg: No tenderness  No edema  Left lower leg: No tenderness  No edema  Right ankle: Swelling present  Tenderness present over the lateral malleolus  Decreased range of motion  Right foot: Normal capillary refill  No swelling or tenderness  Left foot: No swelling or tenderness  Skin:     Capillary Refill: Capillary refill takes less than 2 seconds  Findings: No rash  Neurological:      Mental Status: She is alert and oriented to person, place, and time     Psychiatric:         Mood and Affect: Mood normal          Behavior: Behavior normal          ED Medications  Medications   acetaminophen (TYLENOL) tablet 975 mg (975 mg Oral Given 8/29/21 1906)       Diagnostic Studies  Results Reviewed     None                 XR ankle 3+ views RIGHT    (Results Pending)         Procedures  Procedures      ED Course                                       MDM  Number of Diagnoses or Management Options  Acute right ankle pain  Diagnosis management comments:   27 y o  female presenting for right ankle pain  Will order xray to evaluate for fracture and Rx with tylenol  Fetal heart rate 140 BPM by doppler  I have reviewed preliminary ED interpretation of x-rays with the patient, injury likely a ligamentous sprain  The patient understands that their x-rays will be interpreted independently by a radiologist at a later time  The patient is agreeable to discharge at this time and understands that they may be called back to the emergency department pending formal xray interpretation by radiology  I have discussed with the patient our plan to discharge them from the ED and the patient is in agreement with this plan  The patient was provided a written after visit summary with strict RTED precautions  Discharge Plan: PRN tylenol, ankle placed in air cast and patient provided crutches  RICE therapy  Followup: I have discussed with the patient plan to follow up with Orthopedics   Contact information provided in AVS        Amount and/or Complexity of Data Reviewed  Tests in the radiology section of CPT®: ordered and reviewed  Review and summarize past medical records: yes  Independent visualization of images, tracings, or specimens: yes    Patient Progress  Patient progress: stable      Disposition  Final diagnoses:   Acute right ankle pain     Time reflects when diagnosis was documented in both MDM as applicable and the Disposition within this note     Time User Action Codes Description Comment    8/29/2021  7:33 PM Yung Joe Add [B44 391] Acute right ankle pain       ED Disposition     ED Disposition Condition Date/Time Comment    Discharge Stable Sun Aug 29, 2021  7:33 PM Sae Costello discharge to home/self care  Follow-up Information     Follow up With Specialties Details Why Contact Info Additional [address] [facility] Orthopedic Surgery Schedule an appointment as soon as possible for a visit  For reevaluation if symptoms do not resolve  [address] [facility], [place], [place], [address]          Discharge Medication List as of 2021  8:57 PM      CONTINUE these medications which have NOT CHANGED    Details   Blood Glucose Monitoring Suppl (OneTouch Verio Flex System) w/Device KIT Test 4 times daily, Normal      insulin glargine (Lantus SoloStar) 100 units/mL injection pen Inject 20 Units under the skin daily at bedtime At 9 PM daily  To be titrated  GDM , Starting Thu 2021, Normal      Insulin Pen Needle 31G X 5 MM MISC Inject under the skin daily at bedtime Use one a day or as directed , Starting Thu 2021, Until Sat 2021, Normal      OneTouch Delica Lancets 30B MISC Test 4 times daily, Normal      OneTouch Verio test strip USE TO TEST 4 TIMES A DAY, Normal      Prenatal Multivit-Min-Fe-FA (PRE-BIMAL FORMULA PO) Take by mouth , Historical Med           No discharge procedures on file  PDMP Review     None           ED Provider  Attending physically available and evaluated Sae Costello  I managed the patient along with the ED Attending      Electronically Signed by         Ally Bonilla DO  [phone]

## 2021-08-29 NOTE — ED ATTENDING ATTESTATION
2021  I, Mili Eugene MD, saw and evaluated the patient  I have discussed the patient with the resident/non-physician practitioner and agree with the resident's/non-physician practitioner's findings, Plan of Care, and MDM as documented in the resident's/non-physician practitioner's note, except where noted  All available labs and Radiology studies were reviewed  I was present for key portions of any procedure(s) performed by the resident/non-physician practitioner and I was immediately available to provide assistance  At this point I agree with the current assessment done in the Emergency Department  I have conducted an independent evaluation of this patient a history and physical is as follows:    ED Course         Critical Care Time  Procedures    26 yo female , currently 38 weeks pregnant, c/o right ankle pain, after inverting on curb  Pt landed on side, not on abdomen   Pt with trouble bearing weight and no previous injury to right ankle  Pt with no vaginal bleeding, no abdominal pain, no fluid leakage  Vss, afebrile, lungs cta, rrr, abdomen soft gravid, nontender, right ankle swelling, tender lateral malleolus  Xray  Tylenol  Fetal heart tones

## 2021-09-01 ENCOUNTER — PATIENT MESSAGE (OUTPATIENT)
Dept: PERINATAL CARE | Facility: CLINIC | Age: 26
End: 2021-09-01

## 2021-09-01 DIAGNOSIS — Z3A.31 31 WEEKS GESTATION OF PREGNANCY: ICD-10-CM

## 2021-09-01 DIAGNOSIS — O99.810 HYPERGLYCEMIA IN PREGNANCY: ICD-10-CM

## 2021-09-01 DIAGNOSIS — O99.213 OBESITY AFFECTING PREGNANCY IN THIRD TRIMESTER: ICD-10-CM

## 2021-09-01 DIAGNOSIS — O24.419 GESTATIONAL DIABETES MELLITUS (GDM) IN THIRD TRIMESTER, GESTATIONAL DIABETES METHOD OF CONTROL UNSPECIFIED: ICD-10-CM

## 2021-09-01 RX ORDER — INSULIN GLARGINE 100 [IU]/ML
30 INJECTION, SOLUTION SUBCUTANEOUS
Qty: 15 ML | Refills: 0 | Status: SHIPPED | OUTPATIENT
Start: 2021-09-01 | End: 2021-09-17 | Stop reason: HOSPADM

## 2021-09-01 NOTE — TELEPHONE ENCOUNTER
----- Message from Edwina Lakhani RN sent at 2021  9:31 AM EDT -----  Regarding: FW: Blood sugar log  Contact: [phone]    ----- Message -----  From: Olive Rodriguez  Sent: 2021   9:19 AM EDT  To: [facility]  Subject: RE: Blood sugar log                              [employer] I messaging in regards to needing a refill on my insulin I'm in my last one   ----- Message -----  From: Mar Maxwell RN  Sent: 21, 12:52 PM  To: Diamond Meza Crain  Subject: Blood sugar log    Thank you for reporting your blood sugars, Diamond Meza Crain  Due to FBS rising again, increase Lantus to 30 units at bedtime  Be sure to have bedtime snack that includes at least 15 grams of carbohydrates and 2 to 3 servings of protein  Remember to eat 3 meals and 3 snacks a day  Eating every 2 to 3 5 hours during the day and no more than 8 to 10 hours of fasting overnight  Continue reporting via [employer] glucose flowsheet (under "track my health") on a weekly basis until you deliver  If ok by your OB try adding a 20-30 minute walk in evening after dinner to help keep fasting glucose within range  Remember insulin requirements increase week by week and resistance also increases  You have orders to complete for hemoglobin A1c and CMP, both tests are not fasting  Please complete at your earliest convenvience  If you must go to [facility] or [facility] due to insurance, please provide us with the facilities fax number so we can fax your lab slips  Any issues you can always give us a call at [phone]  Try to check your blood sugar - fasting and 1 hour post start of each meal going forward  Blood Sugar Ranges              Fastin-90 mg/dL   1 hour after the start of each meal: 140 mg/dL or <   2 hours after start of each meal: 120 mg/dL or <     If you have any questions or concerns, please do not hesitate to call us at [phone]      Mar Maxwell RN  [facility]  Cl

## 2021-09-07 PROBLEM — Z3A.37 37 WEEKS GESTATION OF PREGNANCY: Status: ACTIVE | Noted: 2021-03-05

## 2021-09-08 ENCOUNTER — ULTRASOUND (OUTPATIENT)
Dept: PERINATAL CARE | Facility: OTHER | Age: 26
End: 2021-09-08
Payer: COMMERCIAL

## 2021-09-08 ENCOUNTER — ROUTINE PRENATAL (OUTPATIENT)
Dept: PERINATAL CARE | Facility: OTHER | Age: 26
End: 2021-09-08
Payer: COMMERCIAL

## 2021-09-08 ENCOUNTER — PATIENT OUTREACH (OUTPATIENT)
Dept: OBGYN CLINIC | Facility: CLINIC | Age: 26
End: 2021-09-08

## 2021-09-08 VITALS
DIASTOLIC BLOOD PRESSURE: 80 MMHG | WEIGHT: 189.8 LBS | SYSTOLIC BLOOD PRESSURE: 126 MMHG | HEART RATE: 94 BPM | HEIGHT: 61 IN | BODY MASS INDEX: 35.83 KG/M2

## 2021-09-08 DIAGNOSIS — Z3A.37 37 WEEKS GESTATION OF PREGNANCY: ICD-10-CM

## 2021-09-08 DIAGNOSIS — O99.213 OBESITY AFFECTING PREGNANCY IN THIRD TRIMESTER: ICD-10-CM

## 2021-09-08 DIAGNOSIS — O24.414 INSULIN CONTROLLED GESTATIONAL DIABETES MELLITUS (GDM) IN THIRD TRIMESTER: Primary | ICD-10-CM

## 2021-09-08 DIAGNOSIS — O36.63X0 MACROSOMIA OF FETUS AFFECTING MANAGEMENT OF MOTHER IN THIRD TRIMESTER, SINGLE OR UNSPECIFIED FETUS: ICD-10-CM

## 2021-09-08 PROCEDURE — 59025 FETAL NON-STRESS TEST: CPT | Performed by: OBSTETRICS & GYNECOLOGY

## 2021-09-08 PROCEDURE — 99214 OFFICE O/P EST MOD 30 MIN: CPT | Performed by: OBSTETRICS & GYNECOLOGY

## 2021-09-08 PROCEDURE — NC001 PR NO CHARGE: Performed by: OBSTETRICS & GYNECOLOGY

## 2021-09-08 PROCEDURE — 76816 OB US FOLLOW-UP PER FETUS: CPT | Performed by: OBSTETRICS & GYNECOLOGY

## 2021-09-08 NOTE — PATIENT INSTRUCTIONS
Kick Counts in Pregnancy   AMBULATORY CARE:   Kick counts  measure how much your baby is moving in your womb  A kick from your baby can be felt as a twist, turn, swish, roll, or jab  It is common to feel your baby kicking at 26 to 28 weeks of pregnancy  You may feel your baby kick as early as 20 weeks of pregnancy  You may want to start counting at 28 weeks  Contact your healthcare provider immediately if:   · You feel a change in the number of kicks or movements of your baby  · You feel fewer than 10 kicks within 2 hours  · You have questions or concerns about your baby's movements  Why measure kick counts:  Your baby's movement may provide information about your baby's health  He or she may move less, or not at all, if there are problems  Your baby may move less if he or she is not getting enough oxygen or nutrition from the placenta  Do not smoke while you are pregnant  Smoking decreases the amount of oxygen that gets to your baby  Talk to your healthcare provider if you need help to quit smoking  Tell your healthcare provider as soon as you feel a change in your baby's movements  When to measure kick counts:   · Measure kick counts at the same time every day  · Measure kick counts when your baby is awake and most active  Your baby may be most active in the evening  How to measure kick counts:  Check that your baby is awake before you measure kick counts  You can wake up your baby by lightly pushing on your belly, walking, or drinking something cold  Your healthcare provider may tell you different ways to measure kick counts  You may be told to do the following:  · Use a chart or clock to keep track of the time you start and finish counting  · Sit in a chair or lie on your left side  · Place your hands on the largest part of your belly  · Count until you reach 10 kicks  Write down how much time it takes to count 10 kicks  · It may take 30 minutes to 2 hours to count 10 kicks  It should not take more than 2 hours to count 10 kicks  Follow up with your healthcare provider as directed:  Write down your questions so you remember to ask them during your visits  © Copyright [employer] 2021 Information is for End User's use only and may not be sold, redistributed or otherwise used for commercial purposes  All illustrations and images included in CareNotes® are the copyrighted property of A D A M , Inc  or [facility] Valeri Wills   The above information is an  only  It is not intended as medical advice for individual conditions or treatments  Talk to your doctor, nurse or pharmacist before following any medical regimen to see if it is safe and effective for you

## 2021-09-08 NOTE — LETTER
NST sleeve cover sheet    Patient name: Erica Rich  : 1995  MRN: [id number]    SILVIA: Estimated Date of Delivery: 21    Obstetrician: ______Maria Isabel Johnston) for testing:  _____A2GDM      Testing frequency:    _x__ 2x/wk  ___ 1x/wk  ___ Dopplers  ___ BPP?       Last growth scan: __________________________________________

## 2021-09-08 NOTE — PROGRESS NOTES
Renan Cormier has no complaints today  She reports regular fetal movements and does not report any problems  She is here today at 37w3d for an ultrasound for fetal growth  Risk factors include GDM A2 currently on Lantus 30 units at bedtime and a prior ultrasound that showed a fetus that was in the 91st percentile  Her last baby weighed 7 pounds 13 8 ounces and had a 15 minute 2nd stage  Ultrasound findings: The ultrasound today shows fetus in the 86th percentile with a fetal abdomen in greater than 97 percentile  The JAYLA appears normal   Estimated fetal weight is 3565 grams  NST is reactive  Pregnancy ultrasound has limitations and is unable to detect all forms of fetal congenital abnormalities  The inaccuracy in the EFW can be off by 1 lb either way in the third trimester  A macrosomic baby found in a diabetic can increase her risk for a fetal shoulder dystocia  Most of these can be resolved by the different maneuvers that her provider is trained to do  There are some cases though that will be unable to be resolved without some danger to the fetus such as hypoxemia, neurologic damage, fetal death or  nerve injury to an arm ect  ACOG recommends a  section in a mother who has diabetes and her fetus has an estimated fetal weight of 4500 gms or more which is larger then her baby measures today  Follow up recommended:   1  Recommend continue twice weekly NSTs and weekly JAYLA till delivery  2  Recommend delivery after 39 weeks  3    She should take 1/2 of her lantus dose the night before her induction  Serial blood sugars should be checked in labor every 1-2 hrs  4    Recommend resuming a normal diet after birth and completing a 6 week 2hr glucose tolerance test to prove her diabetes resolves  Then recommend she be screened for diabetes yearly for life as she has a higher risk to develop diabetes in the future        Pre visit time reviewing her records   5 minutes  Face to face time 15 minutes  Post visit time on documentation of note, updating her problem list, adding orders and prescriptions 5 minutes  Procedures that were completed today were charged separately  The level of decision making was low level complexity      Tania Evangelista MD

## 2021-09-08 NOTE — LETTER
2021     [place] [address], [address] [address]    Patient: Audra Knox   YOB: 1995   Date of Visit: 2021       Dear Dr Alvaro Hall: Thank you for referring Audra Knox to me for evaluation  Below are my notes for this consultation  If you have questions, please do not hesitate to call me  I look forward to following your patient along with you  Sincerely,        Ibis Warner MD        CC: No Recipients  Ibis Warner MD  2021  2:22 AM  Sign when Signing Visit    Audra Knox has no complaints today  She reports regular fetal movements and does not report any problems  She is here today at 37w3d for an ultrasound for fetal growth  Risk factors include GDM A2 currently on Lantus 30 units at bedtime and a prior ultrasound that showed a fetus that was in the 91st percentile  Her last baby weighed 7 pounds 13 8 ounces and had a 15 minute 2nd stage  Ultrasound findings: The ultrasound today shows fetus in the 86th percentile with a fetal abdomen in greater than 97 percentile  The JAYLA appears normal   Estimated fetal weight is 3565 grams  NST is reactive  Pregnancy ultrasound has limitations and is unable to detect all forms of fetal congenital abnormalities  The inaccuracy in the EFW can be off by 1 lb either way in the third trimester  A macrosomic baby found in a diabetic can increase her risk for a fetal shoulder dystocia  Most of these can be resolved by the different maneuvers that her provider is trained to do  There are some cases though that will be unable to be resolved without some danger to the fetus such as hypoxemia, neurologic damage, fetal death or  nerve injury to an arm ect  ACOG recommends a  section in a mother who has diabetes and her fetus has an estimated fetal weight of 4500 gms or more which is larger then her baby measures today      Follow up recommended:   1  Recommend continue twice weekly NSTs and weekly JAYLA till delivery  2  Recommend delivery after 39 weeks  3    She should take 1/2 of her lantus dose the night before her induction  Serial blood sugars should be checked in labor every 1-2 hrs  4    Recommend resuming a normal diet after birth and completing a 6 week 2hr glucose tolerance test to prove her diabetes resolves  Then recommend she be screened for diabetes yearly for life as she has a higher risk to develop diabetes in the future  Pre visit time reviewing her records   5 minutes  Face to face time 15 minutes  Post visit time on documentation of note, updating her problem list, adding orders and prescriptions 5 minutes  Procedures that were completed today were charged separately  The level of decision making was low level complexity      Rose Yanez MD

## 2021-09-10 ENCOUNTER — ROUTINE PRENATAL (OUTPATIENT)
Dept: OBGYN CLINIC | Facility: CLINIC | Age: 26
End: 2021-09-10

## 2021-09-10 VITALS
SYSTOLIC BLOOD PRESSURE: 126 MMHG | WEIGHT: 189 LBS | HEART RATE: 98 BPM | DIASTOLIC BLOOD PRESSURE: 72 MMHG | HEIGHT: 61 IN | BODY MASS INDEX: 35.68 KG/M2

## 2021-09-10 DIAGNOSIS — O36.63X0 MACROSOMIA OF FETUS AFFECTING MANAGEMENT OF MOTHER IN THIRD TRIMESTER, SINGLE OR UNSPECIFIED FETUS: ICD-10-CM

## 2021-09-10 DIAGNOSIS — Z34.93 PRENATAL CARE IN THIRD TRIMESTER: Primary | ICD-10-CM

## 2021-09-10 DIAGNOSIS — O24.414 INSULIN CONTROLLED GESTATIONAL DIABETES MELLITUS (GDM) IN THIRD TRIMESTER: ICD-10-CM

## 2021-09-10 DIAGNOSIS — Z3A.37 37 WEEKS GESTATION OF PREGNANCY: ICD-10-CM

## 2021-09-10 PROCEDURE — 87150 DNA/RNA AMPLIFIED PROBE: CPT | Performed by: NURSE PRACTITIONER

## 2021-09-10 PROCEDURE — PNV: Performed by: NURSE PRACTITIONER

## 2021-09-10 NOTE — PATIENT INSTRUCTIONS
LABOR PRECAUTIONS  Call our office at [phone] for any of the following:    * I need to call immediately I if I have even a small amount of LIQUID  leaking from my vagina, with or without contractions  * I need to call if I am BLEEDING an amount equal to or more than a period  A small amount of bloody vaginal discharge is normal at the end of the pregnancy  * I need to call if I am having CONTRACTIONS  every five minutes for at least an hour  I will need a watch in order to time them  I should time them from the beginning of one contraction until the beginning of the next one  * I need to call BEFORE  I go to the hospital    * I need to have a plan for TRANSPORTATION  to get to the hospital when I am in labor  Labor is generally not an emergency which requires an ambulance  FETAL KICK COUNTS    In the third trimester (after 28 weeks gestation) you should be performing fetal kick counts every day  Your baby should move at least 10 times in 2 hours during an active time, once a day  Choose atime of day when your baby is most active  Try to do this around the same time each day  Get into a comfortable position and then write down the time your baby first moves  Count each movement until the baby moves 10 times  These movements include kicks, punches, nudges, flutters, or rolls  This can take anywhere from 5 minutes to 2 hours  Write down the time you feel the baby's 10th movement  If 2 hours has passed and your baby has not moved at least 10 times, you should CALL THE OFFICE RIGHT AWAY  [phone]        PERINEAL / VAGINAL MASSAGE    What can I do now to decrease my chances of tearing during delivery? Massaging around the vaginal opening by you (or your partner), either antepartum (before birth) or during the second stage of labor, can reduce the likelihood of perineal tearing during childbirth    Likewise, the use of warm packs held on the perineum during the pushing stage of labor can reduce the severity of your tear  This will happen during the pushing stage of labor  At home, you can also help reduce the chances of injury that may occur during the birth of your child through perineal massage  When should I do this? Starting around or shortly after 34 weeks of pregnancy, you or your partner should provide 5-10 minutes of vaginal massage 1-4 times per week  How? Use either almond, coconut, or olive oil and water mixture on 1 or 2 fingers (depending on comfort)  Insert finger(s) 3-5cm into the vagina  Apply sweeping downward/sideward pressure from 3 to 9 o'clock for 5-10 minutes, 1-4 times per week  GROUP B STREP    Group B Strep (GBS) is a common vaginal bacteria  Approximately 25% of women normally have GBS bacteria present in the vagina  It is NOT a sexually-transmitted infection  In fact, it is not an infection AT ALL! It is just a normal vaginal bacteria for many women  However, the GBS bacteria can be dangerous to a  baby if the baby is exposed to that particular bacteria during labor and birth AND develops an infection from it  The likelihood of a  GBS infection for a woman who has GBS bacteria in the vagina is about 1%-2%  But if it does occur, a baby could become severely ill  For this reason, we do a vaginal culture (Q-tip swab of the vagina and rectum) for ALL pregnant women at approximately 36 weeks of pregnancy  If the culture shows that there is GBS bacteria present, it is NOT a reason to panic! Because in this situation we will give this woman antibiotics through her IV while she is in labor  When a mother is treated with antibiotics during labor and delivery, her baby ALMOST NEVER becomes infected with GBS bacteria

## 2021-09-10 NOTE — PROGRESS NOTES
Akosua Matute presents today for routine OB visit at 37w5d  Blood Pressure: 126/72  Wt=85 7 kg (189 lb); Body mass index is 35 71 kg/m² ; TWG=4 99 kg (11 lb)  Fetal Heart Rate: 152; Fundal Height (cm): 39 cm  SVE today: Cervical Dilation: 1-2 /Cervical Effacement: 20 /Fetal Station: -3  Abdomen: gravid, soft, non-tender  She reports occasional pelvic pressure  Denies uterine contractions  Denies vaginal bleeding or leaking of fluid  Reports adequate fetal movement of at least 10 movements in 2 hours once daily  Reviewed labor precautions and fetal kick counts as well as pre-eclampsia warning signs  Reviewed perineal massage for decreasing risk of perineal lacerations during delivery  GBS collected today  Continue with twice weekly NST  Advised to continue medications and return in 1 week  Current Outpatient Medications   Medication Instructions    Blood Glucose Monitoring Suppl (OneTouch Verio Flex System) w/Device KIT Test 4 times daily    Insulin Pen Needle 31G X 5 MM MISC Subcutaneous, Daily at bedtime, Use one a day or as directed   Lantus SoloStar 30 Units, Subcutaneous, Daily at bedtime, At 9 PM daily  To be titrated  GDM   OneTouch Delica Lancets 72F MISC Test 4 times daily    OneTouch Verio test strip USE TO TEST 4 TIMES A DAY    Prenatal Multivit-Min-Fe-FA (PRE-BIMAL FORMULA PO) Oral      Laboratory workup: initial OB labs (WNL); 28 week labs (1hr GTT 2077); 36 week cultures (GBS collected 9/10/21)    Vaccinations: Tdap given 21    Postpartum contraception: desires OCPs    Fetal Ultrasounds:  21: EFW [id number] (91%)  21: 86th percentile with a fetal abdomen in greater than 97 percentile  The JAYLA appears normal   Estimated fetal weight is 3565 grams  Next US 21    Recommend continue twice weekly NSTs and weekly JAYLA till delivery  Recommend delivery after 39 weeks        Pregnancy Problems (from 21 to present)     Problem Noted Resolved    Macrosomia affecting management of mother in third trimester 2021 by Ever Oreilly MD No    Overview Addendum 9/10/2021  9:05 AM by Christina Linton      21: 86th percentile with a fetal abdomen in greater than 97 percentile  The JAYLA appears normal   Estimated fetal weight is 3565 grams  FOB was 9 pounds 13 ounces at birth  21: fetus in the 91st percentile at 29 weeks  Fetus is symmetric and JAYLA is normal suggesting fetal size is likely secondary to family genetics         Previous Version    BMI 35 0-35 9,adult 2021 by CARLY Cox No    Hyperglycemia in pregnancy 2021 by CARLY Cox No    Insulin controlled gestational diabetes mellitus (GDM) in third trimester 2021 by Miah Chase MD No    Overview Addendum 2021  9:08 AM by Ashanti Garcia MD     1 hour GTT of 207  Continues to have elevated fasting glucose >95  Titration of insulin per Diabetes Educators  Biweekly NST , JAYLA          Previous Version    Vaccine counseling 2021 by Savannah Hansen MD No    Obesity affecting pregnancy in third trimester 3/30/2021 by Savannah Hansen MD No    37 weeks gestation of pregnancy 3/5/2021 by Steve Gonzalez DO No    Overview Addendum 9/10/2021  9:31 AM by CARLY Linton     Last Ultrasounds  -21: EFW [id number] (91%)  -21 86th percentile with a fetal abdomen in greater than   97 percentile     S/p TDAP vaccine 21  Delivery consent signed 21  Contraception: Micronor  Breastfeeding: yes  Birth plan: declines epidural  Discussed  labor precautions and fetal kick counts    Continue prenatal vitamins   Return to clinic for NST in 3 days           Previous Version    Third trimester pregnancy 3/5/2021 by Steve Gonzalez DO No

## 2021-09-12 LAB — GP B STREP DNA SPEC QL NAA+PROBE: NEGATIVE

## 2021-09-14 ENCOUNTER — ULTRASOUND (OUTPATIENT)
Dept: PERINATAL CARE | Facility: CLINIC | Age: 26
End: 2021-09-14
Payer: COMMERCIAL

## 2021-09-14 VITALS
HEART RATE: 81 BPM | DIASTOLIC BLOOD PRESSURE: 78 MMHG | SYSTOLIC BLOOD PRESSURE: 127 MMHG | HEIGHT: 61 IN | BODY MASS INDEX: 36.02 KG/M2 | WEIGHT: 190.8 LBS

## 2021-09-14 DIAGNOSIS — Z3A.38 38 WEEKS GESTATION OF PREGNANCY: Primary | ICD-10-CM

## 2021-09-14 DIAGNOSIS — O24.414 INSULIN CONTROLLED GESTATIONAL DIABETES MELLITUS (GDM) IN THIRD TRIMESTER: ICD-10-CM

## 2021-09-14 PROCEDURE — 76815 OB US LIMITED FETUS(S): CPT | Performed by: OBSTETRICS & GYNECOLOGY

## 2021-09-14 PROCEDURE — 59025 FETAL NON-STRESS TEST: CPT | Performed by: OBSTETRICS & GYNECOLOGY

## 2021-09-14 NOTE — PROGRESS NOTES
Repeat Non-Stress Testing:    Pt verbalizes +FM  Pt denies ALL:               Leaking of fluid   Contractions   Vaginal bleeding   Decreased fetal movement    Patient has no questions or concerns  Dr Deysi Oconnell viewed NST prior to completion of appointment

## 2021-09-14 NOTE — LETTER
September 14, 2021     [address] [place] PROVIDER    Patient: Audra Knox   YOB: 1995   Date of Visit: 9/14/2021       Dear   Provider: Thank you for referring Audra Session to me for evaluation  Below are my notes for this consultation  If you have questions, please do not hesitate to call me  I look forward to following your patient along with you  Sincerely,        Preston Ortiz MD        CC: No Recipients  Preston Ortiz MD  9/13/2021  8:20 AM  Sign when Signing Visit   Please refer to the [facility] ultrasound report in Ob Procedures for additional information regarding today's visit

## 2021-09-15 ENCOUNTER — DOCUMENTATION (OUTPATIENT)
Dept: PERINATAL CARE | Facility: CLINIC | Age: 26
End: 2021-09-15

## 2021-09-15 ENCOUNTER — HOSPITAL ENCOUNTER (INPATIENT)
Facility: HOSPITAL | Age: 26
LOS: 1 days | Discharge: HOME | End: 2021-09-17
Attending: OBSTETRICS & GYNECOLOGY | Admitting: OBSTETRICS & GYNECOLOGY
Payer: COMMERCIAL

## 2021-09-15 PROCEDURE — 4A1HXCZ MONITORING OF PRODUCTS OF CONCEPTION, CARDIAC RATE, EXTERNAL APPROACH: ICD-10-PCS | Performed by: OBSTETRICS & GYNECOLOGY

## 2021-09-15 NOTE — PROGRESS NOTES
Date:  09/15/21  RE: Haroldo Diaz    : 1995  Estimated Date of Delivery: 21  EGA: 38w3d  Referring Provider: CARLY Ayers       Patient reported rest of blood sugars over the telephone today  Date Fasting Post-  breakfast Post-  lunch Post-  dinner Before bedtime Carbs Comments    89 115 84 118       92 107 105 120       87 91 93 112       80 114 90 125      9/15 92 100 104                             Plan:  Continue Lantus to 30 units at bedtime given fasting glucose over the last 5 days trending below 90 mg/dL  Pt stated over the telephone today that she has an upcoming apt on Friday this week with her OB to discuss plans for induction  Asked patient to notify us of those plans come Monday  Diet:  Gestational diabetes meal plan; 3 meals and 3 snacks     -bedtime snack to 1 serving CHO and 2-3 ounces of protein  SMBG: Checks blood sugar 4 times per day; fasting and 2 hour start of each meal    Meter: One Touch Verio  glucose meter  Activity: Walks 30 minutes daily, follow OB recommendations  Support System: Significant Other  Patient Goal: I will walk 20-30 minutes after dinner daily  Labs  Labs ordered 2021, needs to complete    Ultrasounds  21 US: fetal growth and JAYLA appeared normal  2021 Normal JAYLA - growth 91st percentile   2021 US: "The ultrasound today shows fetus in the 86th percentile with a fetal  abdomen in greater than 97 percentile    The JAYLA appears normal   Estimated  fetal weight is 3565 grams"    Further fetal surveillance  NST / JAYLA twice a week at [facility] office and [address], [id number]

## 2021-09-16 PROBLEM — Z3A.38 38 WEEKS GESTATION OF PREGNANCY: Status: ACTIVE | Noted: 2021-03-05

## 2021-09-16 LAB
BASE EXCESS BLDCOA CALC-SCNC: -1.1 MMOL/L (ref 3–11)
BASE EXCESS BLDCOV CALC-SCNC: -1.9 MMOL/L (ref 1–9)
BASOPHILS # BLD AUTO: 0.01 THOUSANDS/ΜL (ref 0–0.1)
BASOPHILS NFR BLD AUTO: 0 % (ref 0–1)
DME PARACHUTE DELIVERY DATE ACTUAL: NORMAL
DME PARACHUTE DELIVERY DATE REQUESTED: NORMAL
DME PARACHUTE ITEM DESCRIPTION: NORMAL
DME PARACHUTE ORDER STATUS: NORMAL
DME PARACHUTE SUPPLIER NAME: NORMAL
DME PARACHUTE SUPPLIER PHONE: NORMAL
EOSINOPHIL # BLD AUTO: 0.08 THOUSAND/ΜL (ref 0–0.61)
EOSINOPHIL NFR BLD AUTO: 1 % (ref 0–6)
ERYTHROCYTE [DISTWIDTH] IN BLOOD BY AUTOMATED COUNT: 14.6 % (ref 11.6–15.1)
ERYTHROCYTE [DISTWIDTH] IN BLOOD BY AUTOMATED COUNT: 14.7 % (ref 11.6–15.1)
HCO3 BLDCOA-SCNC: 26.7 MMOL/L (ref 17.3–27.3)
HCO3 BLDCOV-SCNC: 23.7 MMOL/L (ref 12.2–28.6)
HCT VFR BLD AUTO: 32.1 % (ref 34.8–46.1)
HCT VFR BLD AUTO: 35 % (ref 34.8–46.1)
HGB BLD-MCNC: 10.6 G/DL (ref 11.5–15.4)
HGB BLD-MCNC: 11.4 G/DL (ref 11.5–15.4)
IMM GRANULOCYTES # BLD AUTO: 0.06 THOUSAND/UL (ref 0–0.2)
IMM GRANULOCYTES NFR BLD AUTO: 1 % (ref 0–2)
LYMPHOCYTES # BLD AUTO: 2.05 THOUSANDS/ΜL (ref 0.6–4.47)
LYMPHOCYTES NFR BLD AUTO: 21 % (ref 14–44)
MCH RBC QN AUTO: 27.6 PG (ref 26.8–34.3)
MCH RBC QN AUTO: 27.8 PG (ref 26.8–34.3)
MCHC RBC AUTO-ENTMCNC: 32.6 G/DL (ref 31.4–37.4)
MCHC RBC AUTO-ENTMCNC: 33 G/DL (ref 31.4–37.4)
MCV RBC AUTO: 84 FL (ref 82–98)
MCV RBC AUTO: 85 FL (ref 82–98)
MONOCYTES # BLD AUTO: 0.82 THOUSAND/ΜL (ref 0.17–1.22)
MONOCYTES NFR BLD AUTO: 9 % (ref 4–12)
NEUTROPHILS # BLD AUTO: 6.62 THOUSANDS/ΜL (ref 1.85–7.62)
NEUTS SEG NFR BLD AUTO: 68 % (ref 43–75)
NRBC BLD AUTO-RTO: 0 /100 WBCS
O2 CT VFR BLDCOA CALC: 6.8 ML/DL
OXYHGB MFR BLDCOA: 32.2 %
OXYHGB MFR BLDCOV: 71.8 %
PCO2 BLDCOA: 57.3 MM[HG] (ref 30–60)
PCO2 BLDCOV: 43.5 MM HG (ref 27–43)
PH BLDCOA: 7.29 [PH] (ref 7.23–7.43)
PH BLDCOV: 7.36 [PH] (ref 7.19–7.49)
PLATELET # BLD AUTO: 197 THOUSANDS/UL (ref 149–390)
PLATELET # BLD AUTO: 211 THOUSANDS/UL (ref 149–390)
PMV BLD AUTO: 11.5 FL (ref 8.9–12.7)
PMV BLD AUTO: 11.5 FL (ref 8.9–12.7)
PO2 BLDCOA: 16.6 MM HG (ref 5–25)
PO2 BLDCOV: 30.1 MM HG (ref 15–45)
RBC # BLD AUTO: 3.84 MILLION/UL (ref 3.81–5.12)
RBC # BLD AUTO: 4.1 MILLION/UL (ref 3.81–5.12)
RPR SER QL: NORMAL
SAO2 % BLDCOV: 15.6 ML/DL
WBC # BLD AUTO: 14.96 THOUSAND/UL (ref 4.31–10.16)
WBC # BLD AUTO: 9.64 THOUSAND/UL (ref 4.31–10.16)

## 2021-09-16 PROCEDURE — 86592 SYPHILIS TEST NON-TREP QUAL: CPT | Performed by: OBSTETRICS & GYNECOLOGY

## 2021-09-16 PROCEDURE — 82805 BLOOD GASES W/O2 SATURATION: CPT | Performed by: OBSTETRICS & GYNECOLOGY

## 2021-09-16 PROCEDURE — NC001 PR NO CHARGE: Performed by: OBSTETRICS & GYNECOLOGY

## 2021-09-16 PROCEDURE — 85027 COMPLETE CBC AUTOMATED: CPT

## 2021-09-16 PROCEDURE — 0HQ9XZZ REPAIR PERINEUM SKIN, EXTERNAL APPROACH: ICD-10-PCS | Performed by: OBSTETRICS & GYNECOLOGY

## 2021-09-16 PROCEDURE — 59400 OBSTETRICAL CARE: CPT | Performed by: OBSTETRICS & GYNECOLOGY

## 2021-09-16 PROCEDURE — 99214 OFFICE O/P EST MOD 30 MIN: CPT

## 2021-09-16 PROCEDURE — 85025 COMPLETE CBC W/AUTO DIFF WBC: CPT | Performed by: OBSTETRICS & GYNECOLOGY

## 2021-09-16 PROCEDURE — G0463 HOSPITAL OUTPT CLINIC VISIT: HCPCS

## 2021-09-16 RX ORDER — SODIUM CHLORIDE 9 MG/ML
125 INJECTION, SOLUTION INTRAVENOUS CONTINUOUS
Status: DISCONTINUED | OUTPATIENT
Start: 2021-09-16 | End: 2021-09-16

## 2021-09-16 RX ORDER — DIAPER,BRIEF,INFANT-TODD,DISP
1 EACH MISCELLANEOUS 4 TIMES DAILY PRN
Status: DISCONTINUED | OUTPATIENT
Start: 2021-09-16 | End: 2021-09-17 | Stop reason: HOSPADM

## 2021-09-16 RX ORDER — DIPHENHYDRAMINE HCL 25 MG
25 TABLET ORAL EVERY 6 HOURS PRN
Status: DISCONTINUED | OUTPATIENT
Start: 2021-09-16 | End: 2021-09-17 | Stop reason: HOSPADM

## 2021-09-16 RX ORDER — OXYTOCIN/RINGER'S LACTATE 30/500 ML
250 PLASTIC BAG, INJECTION (ML) INTRAVENOUS CONTINUOUS
Status: ACTIVE | OUTPATIENT
Start: 2021-09-16 | End: 2021-09-16

## 2021-09-16 RX ORDER — DOCUSATE SODIUM 100 MG/1
100 CAPSULE, LIQUID FILLED ORAL 2 TIMES DAILY
Status: DISCONTINUED | OUTPATIENT
Start: 2021-09-16 | End: 2021-09-17 | Stop reason: HOSPADM

## 2021-09-16 RX ORDER — ONDANSETRON 2 MG/ML
4 INJECTION INTRAMUSCULAR; INTRAVENOUS EVERY 8 HOURS PRN
Status: DISCONTINUED | OUTPATIENT
Start: 2021-09-16 | End: 2021-09-17 | Stop reason: HOSPADM

## 2021-09-16 RX ORDER — OXYTOCIN/RINGER'S LACTATE 30/500 ML
PLASTIC BAG, INJECTION (ML) INTRAVENOUS
Status: COMPLETED
Start: 2021-09-16 | End: 2021-09-16

## 2021-09-16 RX ORDER — IBUPROFEN 600 MG/1
600 TABLET ORAL EVERY 6 HOURS PRN
Status: DISCONTINUED | OUTPATIENT
Start: 2021-09-16 | End: 2021-09-17 | Stop reason: HOSPADM

## 2021-09-16 RX ORDER — SIMETHICONE 80 MG
80 TABLET,CHEWABLE ORAL 4 TIMES DAILY PRN
Status: DISCONTINUED | OUTPATIENT
Start: 2021-09-16 | End: 2021-09-17 | Stop reason: HOSPADM

## 2021-09-16 RX ORDER — ACETAMINOPHEN 325 MG/1
650 TABLET ORAL EVERY 4 HOURS PRN
Status: DISCONTINUED | OUTPATIENT
Start: 2021-09-16 | End: 2021-09-17 | Stop reason: HOSPADM

## 2021-09-16 RX ORDER — ONDANSETRON 2 MG/ML
4 INJECTION INTRAMUSCULAR; INTRAVENOUS EVERY 6 HOURS PRN
Status: DISCONTINUED | OUTPATIENT
Start: 2021-09-16 | End: 2021-09-16

## 2021-09-16 RX ADMIN — SODIUM CHLORIDE 999 ML/HR: 0.9 INJECTION, SOLUTION INTRAVENOUS at 00:38

## 2021-09-16 RX ADMIN — WITCH HAZEL 1 PAD: 500 SOLUTION RECTAL; TOPICAL at 01:59

## 2021-09-16 RX ADMIN — IBUPROFEN 600 MG: 600 TABLET ORAL at 04:22

## 2021-09-16 RX ADMIN — Medication 250 UNITS: at 01:08

## 2021-09-16 RX ADMIN — IBUPROFEN 600 MG: 600 TABLET ORAL at 16:29

## 2021-09-16 RX ADMIN — Medication 1 APPLICATION: at 01:59

## 2021-09-16 NOTE — L&D DELIVERY NOTE
Vaginal Delivery Summary - OB/GYN   Dorthea Bound 27 y o  female MRN: [id number]  Unit/Bed#: -01 Encounter: [id number]    Predelivery Diagnosis:  1  Pregnancy at 38w4d  2  A2gDM     Postdelivery Diagnosis:  1  Same as above  2  Delivery of term     Procedure: Spontaneous Vaginal Delivery, repair of first degree perineal laceration    Attending: Noemi Lala    Assistant: Rosa    Anesthesia: Epidural    EBL: 150cc  Admission H 4  Admission platelets: [phone]    Complications: none apparent    Specimens: cord blood, arterial and venous cord blood gasses, placenta to storage    Findings:   1  Viable male at 01:01, with APGARS of 9 and 9 at 1 and 5 minutes respectively,  2  Spontaneous delivery of intact placenta   3  1 degree laceration repaired with 3-0 Vicryl  4  Blood gases:    Umbilical Cord Venous Blood Gas:  Results from last 7 days   Lab Units 21  0106   PH COV  7 355   PCO2 COV mm HG 43 5*   HCO3 COV mmol/L 23 7   BASE EXC COV mmol/L -1 9*   O2 CT CD VB mL/dL 15 6   O2 HGB, VENOUS CORD % 67 5     Umbilical Cord Arterial Blood Gas:  Results from last 7 days   Lab Units 21  0106   PH COA  7 286   PCO2 COA  57 3   PO2 COA mm HG 16 6   HCO3 COA mmol/L 26 7   BASE EXC COA mmol/L -1 1*   O2 CONTENT CORD ART ml/dl 6 8   O2 HGB, ARTERIAL CORD % 32 2       Disposition:  Patient tolerated the procedure well and was recovering in labor and delivery room     Description of procedure    After pushing for 3 minutes, at 01:01 patient delivered a viable male , wt pending, apgars of 9 (1 min) and 9 (5 min)  The fetal vertex delivered spontaneously  Baby was checked for nuchal  There was no nuchal cord  The anterior shoulder delivered atraumatically with maternal expulsive forces and the assistance of downward traction  The posterior shoulder delivered with maternal expulsive forces and the assistance of upward traction  The remainder of the fetus delivered spontaneously       Upon delivery, the infant was placed on the mothers abdomen and the cord was clamped and cut  Delayed cord clamping was performed  The infant was noted to cry spontaneously and was moving all extremities appropriately  There was no evidence for injury  Awaiting nurse resuscitators evaluated the   Arterial and venous cord blood gases and cord blood was collected for analysis  These were promptly sent to the lab  In the immediate post-partum, 30 units of IV pitocin was administered, and the uterus was noted to contract down well with massage and pitocin  The placenta delivered spontaneously at 01:03 and was noted to have a centrally inserted 3 vessel cord  The vagina, cervix, perineum, and rectum were inspected and there was noted to be a small first degree laceration that was repaired with a figure of eight suture  At the conclusion of the procedure, all needle, sponge, and instrument counts were noted to be correct  Patient tolerated the procedure well and was allowed to recover in labor and delivery room with family and  before being transferred to the post-partum floor  Dr De León was present and participated in all key portions of the case          Katlyn Aldridge MD  2021  1:26 AM

## 2021-09-16 NOTE — LACTATION NOTE
This note was copied from a baby's chart  CONSULT - LACTATION  Baby Boy (Diamond) Trice Mems 0 days male MRN: [id number]    [address] Room / Bed: (N)/(N) Encounter: [id number]    Maternal Information     MOTHER:  Claudia Zuniga  Maternal Age: 27 y o    OB History: # 1 - Date: 16, Sex: Female, Weight: 3565 g (7 lb 13 8 oz), GA: 40w2d, Delivery: Vaginal, Spontaneous, Apgar1: 9, Apgar5: 9, Living: Living, Birth Comments: None    # 2 - Date: None, Sex: None, Weight: None, GA: None, Delivery: None, Apgar1: None, Apgar5: None, Living: None, Birth Comments: None   Previouse breast reduction surgery? No    Lactation history:   Has patient previously breast fed: Yes   How long had patient previously breast fed: 4 mo   Previous breast feeding complications: Low milk supply     Past Surgical History:   Procedure Laterality Date    EAR TUBE REMOVAL      WISDOM TOOTH EXTRACTION      Age 10        Birth information:  YOB: 2023   Time of birth: 1:01 AM   Sex: male   Delivery type:     Birth Weight: 3910 g (8 lb 9 9 oz)   Percent of Weight Change: 0%     Gestational Age: 38w3d   [unfilled]    Assessment     Breast and nipple assessment: normal assessment    [place] Assessment: normal assessment    Feeding assessment: feeding well  LATCH:  Latch: Grasps breast, tongue down, lips flanged, rhythmic sucking   Audible Swallowing: Spontaneous and intermittent (24 hours old)   Type of Nipple: Everted (After stimulation)   Comfort (Breast/Nipple): Soft/non-tender   Hold (Positioning): Partial assist, teach one side, mother does other, staff holds   LATCH Score: 9          Feeding recommendations:  pump every 2-3 hours as per moms request; breastfeed on demand  Baby is LGA and on sugar protocol  Early feeding cues demonstrated  Education on early feeding cues and feeding log education provided      **mom will inform lactation what pump to order from ins  **    Discussed risks for early supplementation: over feeding, longer digestion times, engorgement for mom, lower milk supply for mom, and nipple confusion  Benefits of breast feeding for infant's intestinal tract, less engorgement for mom, protection from multiple disease processes as infant develops, avoidance of over feeding for infant, less nipple confusion, and increased health benefits for mom  Discussed with MOB how to utilize feeding log & monitor baby's output  Education on signs of satiation during a feeding, size of baby's belly, and offering both breasts at every feeding session provided  Hand expression demonstration with minimal teach back  Information on hand expression given  Discussed benefits of knowing how to manually express breast including stimulating milk supply, softening nipple for latch and evacuating breast in the event of engorgement  Baby is placed on right breast in football hold  Pillows are used to bring baby up to breast level  Pay close attention to positioning for a deeper latch  Provided education on alignment of nose to breast; bring baby to breast and not breast to baby; support head with opp  Hand in cross cradle; use pillows to lift baby to be belly to belly; ear, shoulder, hip alignment; Support mother's back and place self in comfortable position to support bringing baby to the breast  Shoulders should be down and away from ears  Reviewed early signs of hunger, including tensing of hands and shoulders - no need to wait for open eyes  Crying is a late hunger sign  If baby is crying, soothe baby first and then attempt to latch  Reviewed normal sucking patterns: transition from stimulation to nutritive to release or non-nutritive  The goal is to see and hear lots of swallowing  Reviewed normal nursing pattern: infant could latch on one breast up to 30 minutes or until releases on own   Signs of satiation is open hand with fingers that do not grab your finger  Discussed difference in sensation of non-nutritive v nutritive sucking    All babies are born to breastfeed due to upturned nose and flared nostrils  Mom will always see tip of nose  Babies will unlatch when they can't breathe  Met with mother  Provided mother with Ready, Set, Baby booklet  Discussed Skin to Skin contact an benefits to mom and baby  Talked about the delay of the first bath until baby has adjusted  Spoke about the benefits of rooming in  Feeding on cue and what that means for recognizing infant's hunger  Avoidance of pacifiers for the first month discussed  Talked about exclusive breastfeeding for the first 6 months  Positioning and latch reviewed as well as showing images of other feeding positions  Discussed the properties of a good latch in any position  Reviewed hand/manual expression  Discussed s/s that baby is getting enough milk and some s/s that breastfeeding dyad may need further help  Gave information on common concerns, what to expect the first few weeks after delivery, preparing for other caregivers, and how partners can help  Resources for support also provided    [place], [address] [place] 9/16/2021 10:56 AM

## 2021-09-16 NOTE — DISCHARGE SUMMARY
Discharge Summary - January Retana 27 y o  female MRN: [id number]    Unit/Bed#: -01 Encounter: [id number]    Admission Date: 9/15/2021     Discharge Date: 2021    Admitting Attending: Dr Giovanni Thompson   Delivering Attending: Dr Giovanni Thompson   Discharge Attending: Dr Edwar Echeverria    Diagnosis: IUP at 38w5d     Procedures: Spontaneous Vaginal Delivery    Complications: none apparent    Hospital Course: Patient was admitted for spontaneous rupture of membranes  She quickly progressed to completely dilated with anesthesia  She then pushed for a few minutes  She had a first degree laceration that was repaired with a figure of eight with 3-0 Vicryl  Condition at discharge: good     On day of discharge, patient was tolerating PO, passing flatus, urinating, and ambulating  Her pain was well controlled with oral analgesics  She was discharged home on postpartum day #1 with standard post partum instructions to follow up with her physician in 3-6 weeks for a postpartum appointment  Discharge instructions/Information to patient and family:   - Do not place anything (no partner, tampons or douche) in your vagina for 6 weeks  -You may walk for exercise for the first 6 weeks then gradually return to your usual activities    -Please do not drive for 1 week if you have no stitches and for 2 weeks if you have stitches or underwent a  delivery     -You may take baths or shower per your preference    -Please look at your bust (breasts) in the mirror daily and call for redness or tenderness or increased warmth    -Please call us for temperature > 100 4*F or 38* C, worsening pain or a foul discharge  Discharge Medications:   Prenatal vitamin daily for 6 months or the duration of nursing whichever is longer    Motrin 600 mg orally every 6 hours as needed for pain  Tylenol (over the counter) per bottle directions as needed for pain: do NOT use with percocet  Hydrocortisone cream 1% (over the counter) applied 1-2x daily to hemorrhoids as needed    Provisions for Follow-Up Care: Follow up with your doctor in 3-6 weeks for postpartum care       Planned Readmission: No    Natalia Mckeon MD  Obstetrics & Gynecology PGY-1  9/17/2021  6:48 AM

## 2021-09-16 NOTE — CASE MANAGEMENT
Breast pump consult: [facility] order entered for [employer]omee pump for room delivery via [facility]; no additional needs noted

## 2021-09-16 NOTE — PLAN OF CARE
Problem: PAIN - ADULT  Goal: Verbalizes/displays adequate comfort level or baseline comfort level  Description: Interventions:  - Encourage patient to monitor pain and request assistance  - Assess pain using appropriate pain scale  - Administer analgesics based on type and severity of pain and evaluate response  - Implement non-pharmacological measures as appropriate and evaluate response  - Consider cultural and social influences on pain and pain management  - Notify physician/advanced practitioner if interventions unsuccessful or patient reports new pain  Outcome: Progressing     Problem: INFECTION - ADULT  Goal: Absence or prevention of progression during hospitalization  Description: INTERVENTIONS:  - Assess and monitor for signs and symptoms of infection  - Monitor lab/diagnostic results  - Monitor all insertion sites, i e  indwelling lines, tubes, and drains  - Monitor endotracheal if appropriate and nasal secretions for changes in amount and color  - Hubertus appropriate cooling/warming therapies per order  - Administer medications as ordered  - Instruct and encourage patient and family to use good hand hygiene technique  - Identify and instruct in appropriate isolation precautions for identified infection/condition  Outcome: Progressing  Goal: Absence of fever/infection during neutropenic period  Description: INTERVENTIONS:  - Monitor WBC    Outcome: Progressing     Problem: SAFETY ADULT  Goal: Patient will remain free of falls  Description: INTERVENTIONS:  - Educate patient/family on patient safety including physical limitations  - Instruct patient to call for assistance with activity   - Consult OT/PT to assist with strengthening/mobility   - Keep Call bell within reach  - Keep bed low and locked with side rails adjusted as appropriate  - Keep care items and personal belongings within reach  - Initiate and maintain comfort rounds  - Make Fall Risk Sign visible to staff    - Apply yellow socks and bracelet for high fall risk patients  - Consider moving patient to room near nurses station  Outcome: Progressing  Goal: Maintain or return to baseline ADL function  Description: INTERVENTIONS:  -  Assess patient's ability to carry out ADLs; assess patient's baseline for ADL function and identify physical deficits which impact ability to perform ADLs (bathing, care of mouth/teeth, toileting, grooming, dressing, etc )  - Assess/evaluate cause of self-care deficits   - Assess range of motion  - Assess patient's mobility; develop plan if impaired  - Assess patient's need for assistive devices and provide as appropriate  - Encourage maximum independence but intervene and supervise when necessary  - Involve family in performance of ADLs  - Assess for home care needs following discharge   - Consider OT consult to assist with ADL evaluation and planning for discharge  - Provide patient education as appropriate  Outcome: Progressing  Goal: Maintains/Returns to pre admission functional level  Description: INTERVENTIONS:  - Perform BMAT or MOVE assessment daily    - Set and communicate daily mobility goal to care team and patient/family/caregiver  - Collaborate with rehabilitation services on mobility goals if consulted    - Record patient progress and toleration of activity level   Outcome: Progressing     Problem: Knowledge Deficit  Goal: Patient/family/caregiver demonstrates understanding of disease process, treatment plan, medications, and discharge instructions  Description: Complete learning assessment and assess knowledge base    Interventions:  - Provide teaching at level of understanding  - Provide teaching via preferred learning methods  Outcome: Progressing     Problem: DISCHARGE PLANNING  Goal: Discharge to home or other facility with appropriate resources  Description: INTERVENTIONS:  - Identify barriers to discharge w/patient and caregiver  - Arrange for needed discharge resources and transportation as appropriate  - Identify discharge learning needs (meds, wound care, etc )  - Arrange for interpretive services to assist at discharge as needed  - Refer to Case Management Department for coordinating discharge planning if the patient needs post-hospital services based on physician/advanced practitioner order or complex needs related to functional status, cognitive ability, or social support system  Outcome: Progressing     Problem: POSTPARTUM  Goal: Experiences normal postpartum course  Description: INTERVENTIONS:  - Monitor maternal vital signs  - Assess uterine involution and lochia  Outcome: Progressing  Goal: Appropriate maternal -  bonding  Description: INTERVENTIONS:  - Identify family support  - Assess for appropriate maternal/infant bonding   -Encourage maternal/infant bonding opportunities  - Referral to  or  as needed  Outcome: Progressing  Goal: Establishment of infant feeding pattern  Description: INTERVENTIONS:  - Assess breast/bottle feeding  - Refer to lactation as needed  Outcome: Progressing  Goal: Incision(s), wounds(s) or drain site(s) healing without S/S of infection  Description: INTERVENTIONS  - Assess and document dressing, incision, wound bed, drain sites and surrounding tissue  - Provide patient and family education    Outcome: Progressing

## 2021-09-16 NOTE — DISCHARGE INSTRUCTIONS
Self Care After Delivery   AMBULATORY CARE:   The postpartum period  is the period of time from delivery to about 6 weeks  During this time you may experience many physical and emotional changes  It is important to understand what is normal and when you need to call your healthcare provider  It is also important to know how to care for yourself during this time  Call your local emergency number (911 in the [address] [facility]) for any of the following:   · You see or hear things that are not there, or have thoughts of harming yourself or your baby  · You soak through 1 pad in 15 minutes, have blurry vision, clammy or pale skin, and feel faint  · You faint or lose consciousness  · You have trouble breathing  · You cough up blood  · Your  incision comes apart  Seek care immediately if:   · Your heart is beating faster than usual     · You have a bad headache or changes in your vision  · Your episiotomy or  incision is red, swollen, bleeding, or draining pus  · You have severe abdominal pain  Call your doctor or obstetrician if:   · Your leg is painful, red, and larger than usual     · You soak through 1 or more pads in an hour, or pass blood clots larger than a quarter from your vagina  · You have a fever  · You have new or worsening pain in your abdomen or vagina  · You continue to have depression 1 to 2 weeks after you deliver  · You have trouble sleeping  · You have foul-smelling discharge from your vagina  · You have pain or burning when you urinate  · You do not have a bowel movement for 3 days or more  · You have nausea or are vomiting  · You have hard lumps or red streaks over your breasts  · You have cracked nipples or bleed from your nipples  · You have questions or concerns about your condition or care  Physical changes:   The following are normal changes after you give birth:  · Pain in the area between your anus and vagina    · Breast pain    · Constipation or hemorrhoids    · Hot or cold flashes    · Vaginal bleeding or discharge    · Mild to moderate abdominal cramping    · Difficulty controlling bowel movements or urine    Emotional changes:  A drop in hormone levels after you deliver may cause changes in your emotions  You may feel irritable, sad, or anxious  You may cry easily or for no reason  You may also feel depressed  Depression that continues can be a sign of postpartum depression, a condition that can be treated  Treatment may include talk therapy, medicines, or both  Healthcare providers will ask how you are feeling and if you have any depression  These talks can happen during appointments for your medical care and for your baby's care, such as well child visits  Providers can help you find ways to care for yourself and your baby  Talk to your providers about the following:  · When emotional changes or depression started, and if it is getting worse over time    · Problems you are having with daily activities, sleep, or caring for your baby    · If anything makes you feel worse, or makes you feel better    · Feeling that you are not bonding with your baby the way you want    · Any problems your baby has with sleeping or feeding    · Your baby is fussy or cries a lot    · Support you have from friends, family, or others    Breast care for breastfeeding mothers: You may have sore breasts for 3 to 6 days after you give birth  This happens as your milk begins to fill your breasts  You may also have sore breasts if you do not breastfeed frequently  Do the following to care for your breasts:  · Apply a moist, warm, compress to your breast as directed  This may help soothe your breasts  Make sure the washcloth is not too hot before you apply it to your breast     · Nurse your baby or pump your milk frequently  This may prevent clogged milk ducts  Ask your healthcare provider how often to nurse or pump      · Massage your breasts as directed  This may help increase your milk flow  Gently rub your breasts in a circular motion before you breastfeed  You may need to gently squeeze your breast or nipple to help release milk  You can also use a breast pump to help release milk from your breast     · Wash your breasts with warm water only  Do not put soap on your nipples  Soap may cause your nipples to become dry  · Apply lanolin cream to your nipples as directed  Lanolin cream may add moisture to your skin and prevent nipple dryness  Always  wash off lanolin cream with warm water before you breastfeed  · Place pads in your bra  Your nipples may leak milk when you are not breastfeeding  You can place pads inside of your bra to help prevent leaking onto your clothing  Ask your healthcare provider where to purchase bra pads  · Get breastfeeding support if needed  Healthcare providers can answer questions about breastfeeding and provide you with support  Ask your healthcare provider who you can contact if you need breastfeeding support  Breast care for non-breastfeeding mothers:  Milk will fill your breasts even if you bottle feed your baby  Do the following to help stop your milk from filling your breasts and causing pain:  · Wear a bra with support at all times  A sports bra or a tight-fitting bra will help stop your milk from coming in  · Apply ice on each breast for 15 to 20 minutes every hour or as directed  Use an ice pack, or put crushed ice in a plastic bag  Cover it with a towel before you apply it to your breast  Ice helps your milk ducts shrink  · Keep your breasts away from warm water  Warm water will make it easier for milk to fill your breasts  Stand with your breasts away from warm water in the shower  · Limit how much you touch your breasts  This will prevent them from filling with milk  Perineum care: Your perineum is the area between your rectum and vagina   It is normal to have swelling and pain in this area after you give birth  If you had an episiotomy, your healthcare provider may give you special instructions  · Clean your perineum after you use the bathroom  This may prevent infection and help with healing  Use a spray bottle with warm water to clean your perineum  You may also gently spray warm water against your perineum when you urinate  Always wipe front to back  · Take a sitz bath as directed  A sitz bath may help relieve swelling and pain  Fill your bath tub or bucket with water up to your hips and sit in the water  Use cold water for 2 days after you deliver  Then use warm water  Ask your healthcare provider for more information about a sitz bath  · Apply ice packs for the first 24 hours or as directed  Use a plastic glove filled with ice or buy an ice pack  Wrap the ice pack or plastic glove in a small towel or wash cloth  Place the ice pack on your perineum for 20 minutes at a time  · Sit on a donut-shaped pillow  This may relieve pressure on your perineum when you sit  · Use wipes that contain medicine or take pills as directed  Your healthcare provider may tell you to use witch hazel pads  You can place witch hazel pads in the refrigerator before you apply them to your perineum  Your provider may also tell you to take NSAIDs  Ask him or her how often to take pills or use the wipes  · Do not go swimming or take tub baths for 4 to 6 weeks or as directed  This will help prevent an infection in your vagina or uterus  Bowel and bladder care: It may take 3 to 5 days to have a bowel movement after you deliver your baby  You can do the following to prevent or manage constipation, and get control of your bowel or bladder:  · Take stool softeners as directed  A stool softener is medicine that will make your bowel movements softer  This may prevent or relieve constipation  A stool softener may also make bowel movements less painful  · Drink plenty of liquids    Ask how much liquid to drink each day and which liquids are best for you  Liquids may help prevent constipation  · Eat foods high in fiber  Examples include fruits, vegetables, grains, beans, and lentils  Ask your healthcare provider how much fiber you need each day  Fiber may prevent constipation  · Do Kegel exercises as directed  Kegel exercises will help strengthen the muscles that control bowel movements and urination  Ask your healthcare provider for more information on Kegel exercises  · Apply cold compresses or medicine to hemorrhoids as directed  This may relieve swelling and pain  Your healthcare provider may tell you to apply ice or wipes that contain medicine to your hemorrhoids  He or she may also tell you to use a sitz bath  Ask your provider for more information on how to manage hemorrhoids  Nutrition:  Good nutrition is important in the postpartum period  It will help you return to a healthy weight, increase your energy levels, and prevent constipation  It will also help you get enough nutrients and calories if you are going to breastfeed your baby  · Eat a variety of healthy foods  Healthy foods include fruits, vegetables, whole-grain breads, low-fat dairy products, beans, lean meats, and fish  You may need 500 to 700 extra calories each day if you breastfeed your baby  You may also need extra protein  · Limit foods with added sugar and high amounts of fat  These foods are high in calories and low in healthy nutrients  Read food labels so you know how much sugar and fat is in the food you want to eat  · Drink 8 to 10 glasses of water per day  Water will help you make plenty of milk for your baby  It will also help prevent constipation  Drink a glass of water every time you breastfeed your baby  · Take vitamins as directed  Ask your healthcare provider what vitamins you need  · Limit caffeine and alcohol if you are breastfeeding    Caffeine and alcohol can get into your breast milk  Caffeine and alcohol can make your baby fussy  They can also interfere with your baby's sleep  Ask your healthcare provider if you can drink alcohol or caffeine  Rest and sleep: You may feel very tired in the postpartum period  Enough sleep will help you heal and give you energy to care for your baby  The following may help you get sleep and rest:  · Nap when your baby naps  Your baby may nap several times during the day  Get rest during this time  · Limit visitors  Many people may want to see you and your baby  Ask friends or family to visit on different days  This will give you time to rest     · Do not plan too much for one day  Put off household chores so that you have time to rest  Gradually do more each day  · Ask for help from family, friends, or neighbors  Ask them to help you with laundry, cleaning, or errands  Also ask someone to watch the baby while you take a nap or relax  Ask your partner to help with the care of your baby  Pump some of your breast milk so your partner can feed your baby during the night  Exercise after delivery:  Wait until your healthcare provider says it is okay to exercise  Exercise can help you lose weight, increase your energy levels, and manage your mood  It can also prevent constipation and blood clots  Start with gentle exercises such as walking  Do more as you have more energy  You may need to avoid abdominal exercises for 1 to 2 weeks after you deliver  Talk to your healthcare provider about an exercise plan that is right for you  Sexual activity after delivery:   · Do not have sex until your healthcare provider says it is okay  You may need to wait 4 to 6 weeks before you have sex  This may prevent infection and allow time to heal     · Your menstrual cycle may begin as soon as 3 weeks after you deliver  Your period may be delayed if you breastfeed your baby  You can become pregnant before you get your first postpartum period   Talk to your healthcare provider about birth control that is right for you  Some types of birth control are not safe during breastfeeding  For support and more information:  Join a support group for new mothers  Ask for help from family and friends with chores, errands, and care of your baby  · Office of Women's Health,  Department of Health and Human Services  [address], [address] [facility] , [place] [address]  [address], [address] [facility] [place] [address]  Phone: 0- [phone]  Web Address: www womenshealth gov  · [facility] [address] [facility] , [address] [facility]  [address] [facility] , [address] [facility]  Web Address: [email or web address]  [email or web address]  Follow up with your doctor or obstetrician as directed: You will need to follow up within 2 to 6 weeks of delivery  Write down your questions so you remember to ask them at your visits  © Copyright [employer] 2021 Information is for End User's use only and may not be sold, redistributed or otherwise used for commercial purposes  All illustrations and images included in CareNotes® are the copyrighted property of [employer]  or [facility] [place]   The above information is an  only  It is not intended as medical advice for individual conditions or treatments  Talk to your doctor, nurse or pharmacist before following any medical regimen to see if it is safe and effective for you

## 2021-09-17 VITALS
HEART RATE: 78 BPM | DIASTOLIC BLOOD PRESSURE: 68 MMHG | RESPIRATION RATE: 18 BRPM | WEIGHT: 189 LBS | BODY MASS INDEX: 35.68 KG/M2 | OXYGEN SATURATION: 100 % | TEMPERATURE: 98.5 F | SYSTOLIC BLOOD PRESSURE: 121 MMHG | HEIGHT: 61 IN

## 2021-09-17 PROCEDURE — 99024 POSTOP FOLLOW-UP VISIT: CPT | Performed by: OBSTETRICS & GYNECOLOGY

## 2021-09-17 RX ORDER — ACETAMINOPHEN 325 MG/1
650 TABLET ORAL EVERY 4 HOURS PRN
Refills: 0
Start: 2021-09-17 | End: 2021-12-17 | Stop reason: ALTCHOICE

## 2021-09-17 RX ORDER — ACETAMINOPHEN AND CODEINE PHOSPHATE 120; 12 MG/5ML; MG/5ML
1 SOLUTION ORAL DAILY
Qty: 28 TABLET | Refills: 0
Start: 2021-09-17 | End: 2021-12-17 | Stop reason: ALTCHOICE

## 2021-09-17 RX ORDER — IBUPROFEN 600 MG/1
600 TABLET ORAL EVERY 6 HOURS PRN
Qty: 30 TABLET | Refills: 0
Start: 2021-09-17 | End: 2021-12-17 | Stop reason: ALTCHOICE

## 2021-09-17 NOTE — PLAN OF CARE
Problem: PAIN - ADULT  Goal: Verbalizes/displays adequate comfort level or baseline comfort level  Description: Interventions:  - Encourage patient to monitor pain and request assistance  - Assess pain using appropriate pain scale  - Administer analgesics based on type and severity of pain and evaluate response  - Implement non-pharmacological measures as appropriate and evaluate response  - Consider cultural and social influences on pain and pain management  - Notify physician/advanced practitioner if interventions unsuccessful or patient reports new pain  Outcome: Completed     Problem: INFECTION - ADULT  Goal: Absence or prevention of progression during hospitalization  Description: INTERVENTIONS:  - Assess and monitor for signs and symptoms of infection  - Monitor lab/diagnostic results  - Monitor all insertion sites, i e  indwelling lines, tubes, and drains  - Monitor endotracheal if appropriate and nasal secretions for changes in amount and color  - [place] appropriate cooling/warming therapies per order  - Administer medications as ordered  - Instruct and encourage patient and family to use good hand hygiene technique  - Identify and instruct in appropriate isolation precautions for identified infection/condition  Outcome: Completed  Goal: Absence of fever/infection during neutropenic period  Description: INTERVENTIONS:  - Monitor WBC    Outcome: Completed     Problem: SAFETY ADULT  Goal: Patient will remain free of falls  Description: INTERVENTIONS:  - Educate patient/family on patient safety including physical limitations  - Instruct patient to call for assistance with activity   - Consult OT/PT to assist with strengthening/mobility   - Keep Call bell within reach  - Keep bed low and locked with side rails adjusted as appropriate  - Keep care items and personal belongings within reach  - Initiate and maintain comfort rounds  - Make Fall Risk Sign visible to staff  - Offer Toileting every Hours, in advance of need  - Initiate/Maintain alarm  - Obtain necessary fall risk management equipment:   - Apply yellow socks and bracelet for high fall risk patients  - Consider moving patient to room near nurses station  Outcome: Completed  Goal: Maintain or return to baseline ADL function  Description: INTERVENTIONS:  -  Assess patient's ability to carry out ADLs; assess patient's baseline for ADL function and identify physical deficits which impact ability to perform ADLs (bathing, care of mouth/teeth, toileting, grooming, dressing, etc )  - Assess/evaluate cause of self-care deficits   - Assess range of motion  - Assess patient's mobility; develop plan if impaired  - Assess patient's need for assistive devices and provide as appropriate  - Encourage maximum independence but intervene and supervise when necessary  - Involve family in performance of ADLs  - Assess for home care needs following discharge   - Consider OT consult to assist with ADL evaluation and planning for discharge  - Provide patient education as appropriate  Outcome: Completed  Goal: Maintains/Returns to pre admission functional level  Description: INTERVENTIONS:  - Perform BMAT or MOVE assessment daily    - Set and communicate daily mobility goal to care team and patient/family/caregiver  - Collaborate with rehabilitation services on mobility goals if consulted  - Perform Range of Motion times a day  - Reposition patient every hours    - Dangle patient times a day  - Stand patient times a day  - Ambulate patient times a day  - Out of bed to chair times a day   - Out of bed for meals times a day  - Out of bed for toileting  - Record patient progress and toleration of activity level   Outcome: Completed     Problem: Knowledge Deficit  Goal: Patient/family/caregiver demonstrates understanding of disease process, treatment plan, medications, and discharge instructions  Description: Complete learning assessment and assess knowledge base   Interventions:  - Provide teaching at level of understanding  - Provide teaching via preferred learning methods  Outcome: Completed     Problem: DISCHARGE PLANNING  Goal: Discharge to home or other facility with appropriate resources  Description: INTERVENTIONS:  - Identify barriers to discharge w/patient and caregiver  - Arrange for needed discharge resources and transportation as appropriate  - Identify discharge learning needs (meds, wound care, etc )  - Arrange for interpretive services to assist at discharge as needed  - Refer to Case Management Department for coordinating discharge planning if the patient needs post-hospital services based on physician/advanced practitioner order or complex needs related to functional status, cognitive ability, or social support system  Outcome: Completed     Problem: POSTPARTUM  Goal: Experiences normal postpartum course  Description: INTERVENTIONS:  - Monitor maternal vital signs  - Assess uterine involution and lochia  Outcome: Completed  Goal: Appropriate maternal -  bonding  Description: INTERVENTIONS:  - Identify family support  - Assess for appropriate maternal/infant bonding   -Encourage maternal/infant bonding opportunities  - Referral to  or  as needed  Outcome: Completed  Goal: Establishment of infant feeding pattern  Description: INTERVENTIONS:  - Assess breast/bottle feeding  - Refer to lactation as needed  Outcome: Completed  Goal: Incision(s), wounds(s) or drain site(s) healing without S/S of infection  Description: INTERVENTIONS  - Assess and document dressing, incision, wound bed, drain sites and surrounding tissue  - Provide patient and family education  - Perform skin care/dressing changes every   Outcome: Completed

## 2021-09-17 NOTE — PLAN OF CARE
Problem: PAIN - ADULT  Goal: Verbalizes/displays adequate comfort level or baseline comfort level  Description: Interventions:  - Encourage patient to monitor pain and request assistance  - Assess pain using appropriate pain scale  - Administer analgesics based on type and severity of pain and evaluate response  - Implement non-pharmacological measures as appropriate and evaluate response  - Consider cultural and social influences on pain and pain management  - Notify physician/advanced practitioner if interventions unsuccessful or patient reports new pain  Outcome: Progressing     Problem: INFECTION - ADULT  Goal: Absence or prevention of progression during hospitalization  Description: INTERVENTIONS:  - Assess and monitor for signs and symptoms of infection  - Monitor lab/diagnostic results  - Monitor all insertion sites, i e  indwelling lines, tubes, and drains  - Monitor endotracheal if appropriate and nasal secretions for changes in amount and color  - [place] appropriate cooling/warming therapies per order  - Administer medications as ordered  - Instruct and encourage patient and family to use good hand hygiene technique  - Identify and instruct in appropriate isolation precautions for identified infection/condition  Outcome: Progressing     Problem: INFECTION - ADULT  Goal: Absence of fever/infection during neutropenic period  Description: INTERVENTIONS:  - Monitor WBC    Outcome: Progressing     Problem: SAFETY ADULT  Goal: Patient will remain free of falls  Description: INTERVENTIONS:  - Educate patient/family on patient safety including physical limitations  - Instruct patient to call for assistance with activity   - Consult OT/PT to assist with strengthening/mobility   - Keep Call bell within reach  - Keep bed low and locked with side rails adjusted as appropriate  - Keep care items and personal belongings within reach  - Initiate and maintain comfort rounds  - Make Fall Risk Sign visible to staff  - Offer Toileting every  Hours, in advance of need  - Initiate/Maintain alarm  - Obtain necessary fall risk management equipment:   - Apply yellow socks and bracelet for high fall risk patients  - Consider moving patient to room near nurses station  Outcome: Progressing  Goal: Maintain or return to baseline ADL function  Description: INTERVENTIONS:  -  Assess patient's ability to carry out ADLs; assess patient's baseline for ADL function and identify physical deficits which impact ability to perform ADLs (bathing, care of mouth/teeth, toileting, grooming, dressing, etc )  - Assess/evaluate cause of self-care deficits   - Assess range of motion  - Assess patient's mobility; develop plan if impaired  - Assess patient's need for assistive devices and provide as appropriate  - Encourage maximum independence but intervene and supervise when necessary  - Involve family in performance of ADLs  - Assess for home care needs following discharge   - Consider OT consult to assist with ADL evaluation and planning for discharge  - Provide patient education as appropriate  Outcome: Progressing  Goal: Maintains/Returns to pre admission functional level  Description: INTERVENTIONS:  - Perform BMAT or MOVE assessment daily    - Set and communicate daily mobility goal to care team and patient/family/caregiver  - Collaborate with rehabilitation services on mobility goals if consulted  - Perform Range of Motion  times a day  - Reposition patient every  hours    - Dangle patient  times a day  - Stand patient  times a day  - Ambulate patient  times a day  - Out of bed to chair  times a day   - Out of bed for meals  times a day  - Out of bed for toileting  - Record patient progress and toleration of activity level   Outcome: Progressing     Problem: Knowledge Deficit  Goal: Patient/family/caregiver demonstrates understanding of disease process, treatment plan, medications, and discharge instructions  Description: Complete learning assessment and assess knowledge base    Interventions:  - Provide teaching at level of understanding  - Provide teaching via preferred learning methods  Outcome: Progressing     Problem: DISCHARGE PLANNING  Goal: Discharge to home or other facility with appropriate resources  Description: INTERVENTIONS:  - Identify barriers to discharge w/patient and caregiver  - Arrange for needed discharge resources and transportation as appropriate  - Identify discharge learning needs (meds, wound care, etc )  - Arrange for interpretive services to assist at discharge as needed  - Refer to Case Management Department for coordinating discharge planning if the patient needs post-hospital services based on physician/advanced practitioner order or complex needs related to functional status, cognitive ability, or social support system  Outcome: Progressing     Problem: POSTPARTUM  Goal: Experiences normal postpartum course  Description: INTERVENTIONS:  - Monitor maternal vital signs  - Assess uterine involution and lochia  Outcome: Progressing  Goal: Appropriate maternal -  bonding  Description: INTERVENTIONS:  - Identify family support  - Assess for appropriate maternal/infant bonding   -Encourage maternal/infant bonding opportunities  - Referral to  or  as needed  Outcome: Progressing  Goal: Establishment of infant feeding pattern  Description: INTERVENTIONS:  - Assess breast/bottle feeding  - Refer to lactation as needed  Outcome: Progressing  Goal: Incision(s), wounds(s) or drain site(s) healing without S/S of infection  Description: INTERVENTIONS  - Assess and document dressing, incision, wound bed, drain sites and surrounding tissue  - Provide patient and family education  - Perform skin care/dressing changes every   Outcome: Progressing

## 2021-09-17 NOTE — PROGRESS NOTES
Progress Note - OB/GYN   Shaggy Rochester 27 y o  female MRN: [id number]  Unit/Bed#: -01 Encounter: [id number]    Assessment:  PPD#1 s/p Spontaneous Vaginal Delivery, stable    Plan:    1) Postpartum care  - Encourage ambulation  - Encourage breastfeeding  - Continue current meds      2) Postpartum Shortness of Breath   - Seen shortly after delivery for shortness of breath   - Hgb 11 4 -> 10 6   - Since resolved     3) Contraception   - plans for OCPs at 6 wk postpartum     4) A2GDM   - 6 week postpartum glucose challenge     5) Disposition  - Anticipate discharge home today    Subjective/Objective     Subjective: Feels well  Would like discharge home today  Breast feeding and supplementing with bottle  Would like OCPs at postpartum visit  Pain: no  Tolerating PO: yes  Voiding: yes  Flatus: yes  BM: yes  Ambulating: yes  Breastfeeding: Breastfeeding and Bottle feeding  Chest pain: no  Shortness of breath: no  Leg pain: no  Lochia: minimal     Objective:     Vitals:  Vitals:    09/16/21 1252 09/16/21 1554 09/16/21 2016 09/17/21 0015   BP: 131/75 120/69 139/64 110/63   BP Location: Right arm  Right arm Right arm   Pulse: 81 84 88 75   Resp:  18 18 18   Temp: 98 4 °F (36 9 °C) 98 8 °F (37 1 °C) 98 7 °F (37 1 °C) 98 3 °F (36 8 °C)   TempSrc: Oral Oral Oral Oral   SpO2: 100%  100% 100%   Weight:       Height:           Physical Exam:   GEN: The patient was alert, pleasant well-appearing female in no acute distress     CV: Regular rate  PULM: nonlabored respirations  MSK: Normal gait  Skin: warm, dry  Neuro: no focal deficits  Psych: normal affect and judgement, cooperative  ABDOMEN: soft, no tenderness, no distention, Uterine fundus firm and non-tender, -1 cm below the umbilicus         Lab Results   Component Value Date    WBC 14 96 (H) 09/16/2021    HGB 10 6 (L) 09/16/2021    HCT 32 1 (L) 09/16/2021    MCV 84 09/16/2021     09/16/2021         Slava Garcia MD, PGY-1  Obstetrics & Gynecology  09/17/21

## 2021-09-17 NOTE — LACTATION NOTE
This note was copied from a baby's chart  Discharge Lactation: mom had questions about lack of active suckling and how to achieve a deep latch  Baby was latched to the right breast in a football hold  With education of alignment, deep latched achieved  Benefits of non-nutritive suck provided  Assistance was provided in bringing the baby to the breast with the nipple aligned to the infant's nose  The baby was encouraged to steffany the nipple to his/her chin to achieve a wide, open mouth  Education and information provided about non-nutritive suck, role of colostrum, and benefits of skin to skin  - Start feedings on breast that last feeding ended   - allow no more than 3 hours between breast feeding sessions   - time between feedings is counted from the beginning of the first feed to the beginning of the next feeding session    Discussed 2nd night syndrome and ways to calm infant  Hand out given  Information on hand expression given  Discussed benefits of knowing how to manually express breast including stimulating milk supply, softening nipple for latch and evacuating breast in the event of engorgement  Education provided on growth spurts and cluster feedings  There are 4 growth spurts before 12 weeks  Cluster feedings may occur a few days before the growth spurt begins  Small, frequent feeds at the breast are encouraged    Met with mother to go over discharge breastfeeding booklet including the feeding log  Emphasized 8 or more (12) feedings in a 24 hour period, what to expect for the number of diapers per day of life and the progression of properties of the  stooling pattern  Reviewed breastfeeding and your lifestyle, storage and preparation of breast milk, how to keep you breast pump clean, the employed breastfeeding mother and paced bottle feeding handouts       Booklet included Breastfeeding Resources for after discharge including access to the number for the [facility]  Encouraged parents to call for assistance, questions, and concerns about breastfeeding  Extension provided      Mom denies appt at [facility] at this time

## 2021-09-18 LAB — PLACENTA IN STORAGE: NORMAL

## 2021-09-20 NOTE — UTILIZATION REVIEW
Inpatient Admission Authorization Request   Notification of Maternity/Delivery &  Birth Information for Admission   SERVICING FACILITY:   [address]  Tax ID: [id number]  NPI: [id number]  Place of Service: Inpatient [facility]  [address]  Place of Service Code: 24     ATTENDING PROVIDER:  Attending Name and NPI#: Kirill Jaquez Md [[id number]]  Address: [place] Ac  [address]  Phone: [phone]     UTILIZATION REVIEW CONTACT:  Hiram Perez Utilization   Network Utilization Review Department  Phone: [phone]  Fax [phone]  Email: [email or web address]     PHYSICIAN ADVISORY SERVICES:  FOR [id number]-ZA-UDTK REVIEW - MEDICAL NECESSITY DENIAL  Phone: [phone]  Fax: [phone]  Email: [email or web address]     TYPE OF REQUEST:  Inpatient Status     ADMISSION INFORMATION:  ADMISSION DATE/TIME: 21 12:17 AM  PATIENT DIAGNOSIS CODE/DESCRIPTION:  Pregnant [Z34 90]  38 weeks gestation of pregnancy [Z3A 38]  Encounter for full-term uncomplicated delivery [M11] The encounter diagnosis was Spontaneous vaginal delivery  1  Spontaneous vaginal delivery      DISCHARGE DATE/TIME: 2021  5:56 PM  DISCHARGE DISPOSITION (IF DISCHARGED): Home/Self Care     MOTHER AND  INFORMATION:  Mother: Makeda Connor 1993   Delivering clinician:    OB History        2    Para   2    Term   2            AB        Living   2       SAB        TAB        Ectopic        Multiple   0    Live Births   2           Obstetric Comments   Menstrual cycle: 15            Name & MRN:   Information for the patient's :  Silvia Jimenez [[id number]]      Delivery Information:  Sex: male  Delivered 2021 1:01 AM by Vaginal, Spontaneous; Gestational Age: 38w3d     Measurements:  Weight: 8 lb 9 9 oz (3910 g);   Height: 20 25"    APGAR 1 minute 5 minutes 10 minutes   Totals: 9 9       Birth Information: 27 y o  female MRN: [id number] Unit/Bed#: -01 Estimated Date of Delivery: 21  Birthweight: No birth weight on file  Gestational Age: <None> Delivery Type: Vaginal, Spontaneous          APGARS  One minute Five minutes Ten minutes   Totals:               IMPORTANT INFORMATION:  Please contact the [facility] directly with any questions or concerns regarding this request  Department voicemails are confidential     Send requests for admission clinical reviews, concurrent reviews, approvals, and administrative denials due to lack of clinical to fax [phone]

## 2021-09-20 NOTE — UTILIZATION REVIEW
Edna Ramirez MD   Physician   OB/GYN   Discharge Summary      Addendum   Date of Service:  2021  7:39 AM                       Discharge Summary - Erin Gu 27 y o  female MRN: [id number]     Unit/Bed#: -01 Encounter: [id number]     Admission Date: 9/15/2021      Discharge Date: 2021     Admitting Attending: Dr Dary Castañeda   Delivering Attending: Dr Dary Castañeda   Discharge Attending: Dr Augusto monique     Diagnosis: IUP at 38w5d      Procedures: Spontaneous Vaginal Delivery     Complications: none apparent     Hospital Course: Patient was admitted for spontaneous rupture of membranes  She quickly progressed to completely dilated with anesthesia  She then pushed for a few minutes  She had a first degree laceration that was repaired with a figure of eight with 3-0 Vicryl       Condition at discharge: good      On day of discharge, patient was tolerating PO, passing flatus, urinating, and ambulating  Her pain was well controlled with oral analgesics  She was discharged home on postpartum day #1 with standard post partum instructions to follow up with her physician in 3-6 weeks for a postpartum appointment       Discharge instructions/Information to patient and family:   - Do not place anything (no partner, tampons or douche) in your vagina for 6 weeks  -You may walk for exercise for the first 6 weeks then gradually return to your usual activities    -Please do not drive for 1 week if you have no stitches and for 2 weeks if you have stitches or underwent a  delivery     -You may take baths or shower per your preference    -Please look at your bust (breasts) in the mirror daily and call for redness or tenderness or increased warmth    -Please call us for temperature > 100 4*F or 38* C, worsening pain or a foul discharge       Discharge Medications:   Prenatal vitamin daily for 6 months or the duration of nursing whichever is longer    Motrin 600 mg orally every 6 hours as needed for pain  Tylenol (over the counter) per bottle directions as needed for pain: do NOT use with percocet  Hydrocortisone cream 1% (over the counter) applied 1-2x daily to hemorrhoids as needed     Provisions for Follow-Up Care:   Follow up with your doctor in 3-6 weeks for postpartum care       Planned Readmission: No     Carol Ann MD  Obstetrics & Gynecology PGY-1  9/17/2021  6:48 AM                        Revision History

## 2021-09-24 ENCOUNTER — DOCUMENTATION (OUTPATIENT)
Dept: OBGYN CLINIC | Facility: CLINIC | Age: 26
End: 2021-09-24

## 2021-09-24 NOTE — PROGRESS NOTES
Post Partum Transition of Care  Received: Today  FCO Mccoy, Generic    Hi Toby,     I tried to call the number on file and was unable to reach you  I wanted to Congratulate you on your delivery and also ask you a few questions regarding how your Post Partum recovery is going  Please give the office a call back at your earliest convenience  I also see you have a post partum visit scheduled on 10/8/21- please reach out to us if you have any questions or concerns prior to your scheduled visit       Look forward to hearing from you,     Jackie Reynolds RN     ** Message sent to patient via [address]

## 2021-10-08 ENCOUNTER — PATIENT OUTREACH (OUTPATIENT)
Dept: OBGYN CLINIC | Facility: CLINIC | Age: 26
End: 2021-10-08

## 2021-10-08 ENCOUNTER — POSTPARTUM VISIT (OUTPATIENT)
Dept: OBGYN CLINIC | Facility: CLINIC | Age: 26
End: 2021-10-08

## 2021-10-08 ENCOUNTER — OFFICE VISIT (OUTPATIENT)
Dept: POSTPARTUM | Facility: CLINIC | Age: 26
End: 2021-10-08
Payer: COMMERCIAL

## 2021-10-08 VITALS
WEIGHT: 171 LBS | HEART RATE: 86 BPM | SYSTOLIC BLOOD PRESSURE: 118 MMHG | BODY MASS INDEX: 32.28 KG/M2 | DIASTOLIC BLOOD PRESSURE: 72 MMHG | HEIGHT: 61 IN

## 2021-10-08 DIAGNOSIS — O24.414 INSULIN CONTROLLED GESTATIONAL DIABETES MELLITUS (GDM) IN THIRD TRIMESTER: ICD-10-CM

## 2021-10-08 DIAGNOSIS — O92.79 PAIN AGGRAVATED BY BREAST FEEDING: ICD-10-CM

## 2021-10-08 DIAGNOSIS — Z71.89 ENCOUNTER FOR BREAST FEEDING COUNSELING: Primary | ICD-10-CM

## 2021-10-08 DIAGNOSIS — R52 PAIN AGGRAVATED BY BREAST FEEDING: ICD-10-CM

## 2021-10-08 PROCEDURE — 99404 PREV MED CNSL INDIV APPRX 60: CPT | Performed by: PEDIATRICS

## 2021-10-12 ENCOUNTER — OFFICE VISIT (OUTPATIENT)
Dept: FAMILY MEDICINE CLINIC | Facility: CLINIC | Age: 26
End: 2021-10-12

## 2021-10-12 VITALS
WEIGHT: 173.6 LBS | OXYGEN SATURATION: 99 % | HEIGHT: 61 IN | BODY MASS INDEX: 32.77 KG/M2 | DIASTOLIC BLOOD PRESSURE: 70 MMHG | HEART RATE: 75 BPM | RESPIRATION RATE: 18 BRPM | SYSTOLIC BLOOD PRESSURE: 110 MMHG | TEMPERATURE: 97.9 F

## 2021-10-12 PROCEDURE — PNV: Performed by: FAMILY MEDICINE

## 2021-10-12 PROCEDURE — 3008F BODY MASS INDEX DOCD: CPT | Performed by: OBSTETRICS & GYNECOLOGY

## 2021-10-20 ENCOUNTER — OFFICE VISIT (OUTPATIENT)
Dept: POSTPARTUM | Facility: CLINIC | Age: 26
End: 2021-10-20
Payer: COMMERCIAL

## 2021-10-20 DIAGNOSIS — Z71.89 ENCOUNTER FOR BREAST FEEDING COUNSELING: Primary | ICD-10-CM

## 2021-10-20 PROCEDURE — 99404 PREV MED CNSL INDIV APPRX 60: CPT | Performed by: PEDIATRICS

## 2021-10-27 ENCOUNTER — OFFICE VISIT (OUTPATIENT)
Dept: POSTPARTUM | Facility: CLINIC | Age: 26
End: 2021-10-27

## 2021-11-10 NOTE — UTILIZATION REVIEW
Inpatient Admission Authorization Request   Notification of Maternity/Delivery &  Birth Information for Admission   SERVICING FACILITY:   [facility]  [address]  Tax ID: [id number]  NPI: [id number]  Place of Service: [facility]patient [facility]  [address]  Place of Service Code: 24     ATTENDING PROVIDER:  Attending Name and NPI#: Clarisa Josue Md [[id number]]  Address: [place] [address]  Phone: [phone]     UTILIZATION REVIEW CONTACT:  Thomas Wan Utilization   Network Utilization Review Department  Phone: [phone]  Fax [phone]  Email: [email or web address]     PHYSICIAN ADVISORY SERVICES:  FOR VBJV-TB-UQRS REVIEW - MEDICAL NECESSITY DENIAL  Phone: [phone]  Fax: [phone]  Email: [email or web address]     TYPE OF REQUEST:  Inpatient Status     ADMISSION INFORMATION:  ADMISSION DATE/TIME: 21 12:17 AM  PATIENT DIAGNOSIS CODE/DESCRIPTION:  Pregnant [Z34 90]  38 weeks gestation of pregnancy [Z3A 38]  Encounter for full-term uncomplicated delivery [B70] The encounter diagnosis was Spontaneous vaginal delivery  1  Spontaneous vaginal delivery      DISCHARGE DATE/TIME: 2021  5:56 PM  DISCHARGE DISPOSITION (IF DISCHARGED): Home/Self Care     MOTHER AND  INFORMATION:  Mother: Talha Braswell 1993   Delivering clinician:    OB History        2    Para   2    Term   2            AB        Living   2       SAB        IAB        Ectopic        Multiple   0    Live Births   2           Obstetric Comments   Menstrual cycle: 15           Elmer Name & MRN:   Information for the patient's :  Danie Mc [[id number]]     Elmer Delivery Information:  Sex: male  Delivered 2021 1:01 AM by Vaginal, Spontaneous; Gestational Age: 38w3d    Elmer Measurements:  Weight: 8 lb 9 9 oz (3910 g);   Height: 20 25"    APGAR 1 minute 5 minutes 10 minutes   Totals: 9 9       Birth Information: 27 y o  female MRN: [id number] Unit/Bed#: -01 Estimated Date of Delivery: 21  Birthweight: No birth weight on file  Gestational Age: <None> Delivery Type: Vaginal, Spontaneous          APGARS  One minute Five minutes Ten minutes   Totals:               IMPORTANT INFORMATION:  Please contact the Juliana Vernon directly with any questions or concerns regarding this request  Department voicemails are confidential     Send requests for admission clinical reviews, concurrent reviews, approvals, and administrative denials due to lack of clinical to fax [phone]

## 2021-11-11 ENCOUNTER — OFFICE VISIT (OUTPATIENT)
Dept: POSTPARTUM | Facility: CLINIC | Age: 26
End: 2021-11-11
Payer: COMMERCIAL

## 2021-11-11 DIAGNOSIS — Z71.89 ENCOUNTER FOR BREAST FEEDING COUNSELING: Primary | ICD-10-CM

## 2021-11-11 PROCEDURE — 99404 PREV MED CNSL INDIV APPRX 60: CPT | Performed by: PEDIATRICS

## 2021-11-22 ENCOUNTER — OFFICE VISIT (OUTPATIENT)
Dept: FAMILY MEDICINE CLINIC | Facility: CLINIC | Age: 26
End: 2021-11-22

## 2021-11-22 VITALS
RESPIRATION RATE: 16 BRPM | HEIGHT: 61 IN | SYSTOLIC BLOOD PRESSURE: 110 MMHG | WEIGHT: 174 LBS | BODY MASS INDEX: 32.85 KG/M2 | TEMPERATURE: 97.9 F | HEART RATE: 75 BPM | OXYGEN SATURATION: 99 % | DIASTOLIC BLOOD PRESSURE: 80 MMHG

## 2021-11-22 DIAGNOSIS — Z30.09 ENCOUNTER FOR COUNSELING REGARDING CONTRACEPTION: Primary | ICD-10-CM

## 2021-11-22 PROCEDURE — 3008F BODY MASS INDEX DOCD: CPT | Performed by: PEDIATRICS

## 2021-11-22 PROCEDURE — 3008F BODY MASS INDEX DOCD: CPT | Performed by: FAMILY MEDICINE

## 2021-11-22 PROCEDURE — 1036F TOBACCO NON-USER: CPT | Performed by: FAMILY MEDICINE

## 2021-11-22 PROCEDURE — 3725F SCREEN DEPRESSION PERFORMED: CPT | Performed by: FAMILY MEDICINE

## 2021-11-22 PROCEDURE — 99213 OFFICE O/P EST LOW 20 MIN: CPT | Performed by: FAMILY MEDICINE

## 2021-12-17 ENCOUNTER — TELEPHONE (OUTPATIENT)
Dept: FAMILY MEDICINE CLINIC | Facility: CLINIC | Age: 26
End: 2021-12-17

## 2021-12-17 ENCOUNTER — OFFICE VISIT (OUTPATIENT)
Dept: FAMILY MEDICINE CLINIC | Facility: CLINIC | Age: 26
End: 2021-12-17

## 2021-12-17 VITALS
DIASTOLIC BLOOD PRESSURE: 86 MMHG | BODY MASS INDEX: 32.1 KG/M2 | OXYGEN SATURATION: 98 % | HEART RATE: 79 BPM | TEMPERATURE: 97.7 F | HEIGHT: 61 IN | SYSTOLIC BLOOD PRESSURE: 120 MMHG | WEIGHT: 170 LBS | RESPIRATION RATE: 18 BRPM

## 2021-12-17 DIAGNOSIS — Z11.59 ENCOUNTER FOR HEPATITIS C SCREENING TEST FOR LOW RISK PATIENT: ICD-10-CM

## 2021-12-17 DIAGNOSIS — Z00.00 ANNUAL PHYSICAL EXAM: Primary | ICD-10-CM

## 2021-12-17 DIAGNOSIS — E66.9 OBESITY (BMI 30.0-34.9): ICD-10-CM

## 2021-12-17 PROBLEM — E66.811 OBESITY (BMI 30.0-34.9): Status: ACTIVE | Noted: 2021-07-29

## 2021-12-17 PROCEDURE — 3008F BODY MASS INDEX DOCD: CPT | Performed by: PEDIATRICS

## 2021-12-17 PROCEDURE — 1036F TOBACCO NON-USER: CPT | Performed by: FAMILY MEDICINE

## 2021-12-17 PROCEDURE — 3008F BODY MASS INDEX DOCD: CPT | Performed by: FAMILY MEDICINE

## 2021-12-17 PROCEDURE — 3725F SCREEN DEPRESSION PERFORMED: CPT | Performed by: FAMILY MEDICINE

## 2021-12-17 PROCEDURE — 99395 PREV VISIT EST AGE 18-39: CPT | Performed by: FAMILY MEDICINE

## 2021-12-27 ENCOUNTER — TELEPHONE (OUTPATIENT)
Dept: FAMILY MEDICINE CLINIC | Facility: CLINIC | Age: 26
End: 2021-12-27

## 2022-01-07 ENCOUNTER — PROCEDURE VISIT (OUTPATIENT)
Dept: FAMILY MEDICINE CLINIC | Facility: CLINIC | Age: 27
End: 2022-01-07

## 2022-01-07 VITALS
BODY MASS INDEX: 32.1 KG/M2 | TEMPERATURE: 97.6 F | RESPIRATION RATE: 16 BRPM | HEIGHT: 61 IN | OXYGEN SATURATION: 98 % | HEART RATE: 84 BPM | WEIGHT: 170 LBS | DIASTOLIC BLOOD PRESSURE: 70 MMHG | SYSTOLIC BLOOD PRESSURE: 110 MMHG

## 2022-01-07 DIAGNOSIS — Z30.017 NEXPLANON INSERTION: Primary | ICD-10-CM

## 2022-01-07 LAB — SL AMB POCT URINE HCG: NEGATIVE

## 2022-01-07 PROCEDURE — 81025 URINE PREGNANCY TEST: CPT | Performed by: FAMILY MEDICINE

## 2022-01-07 PROCEDURE — 3008F BODY MASS INDEX DOCD: CPT | Performed by: FAMILY MEDICINE

## 2022-01-07 PROCEDURE — 99213 OFFICE O/P EST LOW 20 MIN: CPT | Performed by: FAMILY MEDICINE

## 2022-01-07 PROCEDURE — 11981 INSERTION DRUG DLVR IMPLANT: CPT | Performed by: FAMILY MEDICINE

## 2022-01-07 NOTE — PROGRESS NOTES
CLINIC VISIT NOTE - Kian Crain 27 y o  female MRN: [id number]  Encounter: [id number],  Primary Care Provider: Deana Ruvalcaba MD      Date: 01/07/22    Assessments & Plans:     Problem List Items Addressed This Visit        Other    Nexplanon insertion - Primary     Patient desires long-term reversible contraception and has decided on Nexplanon as she has previous positive experiences  - Risk reviewed and consent form signed  - Nexplanon placed today without complications  - Scheduled for removal after 3 years (1/6/2025)         Relevant Medications    etonogestrel (NEXPLANON) subdermal implant 68 mg (Completed)    Other Relevant Orders    POCT urine HCG (Completed)    Remove and insert drug implant            Universal Protocol:  Procedure performed by: (Dr Sandip Weber)  Consent: Verbal consent obtained  Written consent obtained  Risks and benefits: risks, benefits and alternatives were discussed  Consent given by: patient  Patient understanding: patient states understanding of the procedure being performed  Site marked: the operative site was marked  Patient identity confirmed: verbally with patient    Remove and insert drug implant    Date/Time: 1/7/2022 12:32 PM  Performed by: Deana Ruvalcaba MD  Authorized by: Deana Ruvalcaba MD     Indication:     Indication: Insertion of non-biodegradable drug delivery implant    Pre-procedure:     Prepped with: alcohol 70% and povidone-iodine      Local anesthetic:  Lidocaine with epinephrine    The site was cleaned and prepped in a sterile fashion: yes    Procedure:     Procedure:   Insertion    Small stab incision was made in arm: no      Left/right:  Right    Preloaded contraceptive capsule trocar was placed subdermally: yes      Visualization of implant was obtained: yes      Contraceptive capsule was inserted and trocar removed: yes      Visualization of notch in stylet and palpation of device: yes      Palpation confirms placement by provider and patient: yes      Site was closed with steri-strips and pressure bandage applied: yes              Follow-up: No follow-ups on file  Patient Active Problem List   Diagnosis    Spontaneous vaginal delivery    Third trimester pregnancy    Obesity affecting pregnancy in third trimester    Depression during pregnancy in third trimester    Encounter for counseling regarding contraception    Vaccine counseling    Insulin controlled gestational diabetes mellitus (GDM) in third trimester    Obesity (BMI 30 0-34  9)    Hyperglycemia in pregnancy    Macrosomia affecting management of mother in third trimester    Annual physical exam    Nexplanon insertion         Recent Visits:     Recent Visits  No visits were found meeting these conditions  Showing recent visits within past 7 days and meeting all other requirements  Future Appointments  No visits were found meeting these conditions  Showing future appointments within next 150 days and meeting all other requirements      Subjective:     Chief Complaint   Patient presents with    Procedure     Erickson Madden       HPI:  27 y o  female presents for Nexplanon insertion  Patient desires long-term reversible contraception and has decided on Nexplanon as she has prior positive experiences  Review of System:   Pt denies any fever, chill, excessive fatigue, unexpected weight-loss, headache, dizziness, blurry vision, rhinorrhea/epistasis nausea/vomiting, cough, hoarse voice, chest-pain, shortness of breath, exertional dyspnea, abdominal pain, dysuria, diarrhea, or new extremity weakness/paraesthesia       A 12-point, complete review of systems was reviewed and negative except as stated above   History:     Past Medical History:   Diagnosis Date    Depression     Diabetes, gestational      Past Surgical History:   Procedure Laterality Date    EAR TUBE REMOVAL      WISDOM TOOTH EXTRACTION      Age 10     Social History   Social History     Substance and Sexual Activity   Alcohol Use No     Social History     Substance and Sexual Activity   Drug Use No     Social History     Tobacco Use   Smoking Status Never Smoker   Smokeless Tobacco Former User       Medications & Allergies:   No Known Allergies    PTA Medications:  No current outpatient medications on file prior to visit  No current facility-administered medications on file prior to visit  Objective & Vitals:   Vitals: /70 (BP Location: Left arm, Patient Position: Sitting, Cuff Size: Large)   Pulse 84   Temp 97 6 °F (36 4 °C) (Temporal)   Resp 16   Ht 5' 1" (1 549 m)   Wt 77 1 kg (170 lb)   SpO2 98%   BMI 32 12 kg/m²       Labs, Imagings, and other studies:   I have personally reviewed pertinent reports  Recent Results (from the past 24 hour(s))   POCT urine HCG    Collection Time: 01/07/22 12:03 PM   Result Value Ref Range    URINE HCG Negative      No results found  Future Labs & Appointments:     FUTURE LABS:  Lab Frequency Next Occurrence       FUTURE CONFIRMED APPOINTMENTS:  No future appointments  Beny Morel MD  PGY-2, Family Medicine  01/07/22  6:52 PM    Dear reader, please be aware that portions of my note contain dictated text  I have done my best to proof-read this note prior to signing  However, there may be occasional unnoticed errors pertaining to "sound-alike" words and/or grammar during my dictation process  If there is any words or information that is unclear or appears erroneous, please kindly let me know and I will clarify and/or addend my notes accordingly  Thank you for your understanding

## 2022-01-07 NOTE — ASSESSMENT & PLAN NOTE
Patient desires long-term reversible contraception and has decided on Nexplanon as she has prior positive experiences  - POC UPT negative  - Risk reviewed and consent form signed  - Nexplanon placed today without complications  - Scheduled for removal after 3 years (1/6/2025)

## 2022-03-04 ENCOUNTER — OFFICE VISIT (OUTPATIENT)
Dept: FAMILY MEDICINE CLINIC | Facility: CLINIC | Age: 27
End: 2022-03-04

## 2022-03-04 VITALS
SYSTOLIC BLOOD PRESSURE: 118 MMHG | WEIGHT: 175 LBS | RESPIRATION RATE: 18 BRPM | BODY MASS INDEX: 33.04 KG/M2 | DIASTOLIC BLOOD PRESSURE: 74 MMHG | OXYGEN SATURATION: 98 % | HEART RATE: 95 BPM | TEMPERATURE: 97.8 F | HEIGHT: 61 IN

## 2022-03-04 DIAGNOSIS — O92.4 DECREASED MILK PRODUCTION: Primary | ICD-10-CM

## 2022-03-04 DIAGNOSIS — N93.9 ABNORMAL UTERINE BLEEDING: ICD-10-CM

## 2022-03-04 PROCEDURE — 99213 OFFICE O/P EST LOW 20 MIN: CPT | Performed by: FAMILY MEDICINE

## 2022-03-04 RX ORDER — METOCLOPRAMIDE 10 MG/1
10 TABLET ORAL 2 TIMES DAILY
Qty: 28 TABLET | Refills: 0 | Status: SHIPPED | OUTPATIENT
Start: 2022-03-04 | End: 2022-03-18

## 2022-03-04 NOTE — ASSESSMENT & PLAN NOTE
Pt c/o decreased milk supply (18oz to 3-4 oz per day) after recent Nexplanon placement  - Pt pump every 2 hours and upplementing with [employer] formula but prefer not to   Baby is 10 months old  - Denies any breast tenderness, abnormal discharge, or other red-flag symptoms  - Has tried OTC Munguia's yeast, Flax seed, lactation cookies, and increase hydration without relief  - Symptoms likely side effect of Nexplanon, but pt does not want to remove Nexplanon if can increased milk production  - Will trial short course of Reglan 10mg BID x14 days for galactogogues  - RTC in 2 weeks to reassess

## 2022-03-04 NOTE — PROGRESS NOTES
CLINIC VISIT NOTE - [address] 27 y o  female MRN: [id number]  Encounter: [id number],  Primary Care Provider: Royal Tracy MD      Date: 03/04/22    Assessments & Plans:     Problem List Items Addressed This Visit        Genitourinary    Abnormal uterine bleeding     Pt c/o increased vaginal bleeding everyday, saturating approx 4-5 pads per day, after recent Nexplanon insertion on 1/7/2022  - Denies any pelvic pain, clots, or other vaginal discharge  - Pt is aware that AUB is possible possible side effect of Nexplanon and is not concern at this time and does not want to remove Nexplanon  -  patient on side-effect and expect to improve with time  - Ordered CBC to assess for possible anemia  - RTC if bleeding is worse or if prolonged more than 3-4 months         Relevant Orders    CBC and Platelet       Other    Decreased milk production - Primary     Pt c/o decreased milk supply (18oz to 3-4 oz per day) after recent Nexplanon placement  - Pt pump every 2 hours and upplementing with [employer] formula but prefer not to  Baby is 10 months old  - Denies any breast tenderness, abnormal discharge, or other red-flag symptoms  - Has tried OTC Munguia's yeast, Flax seed, lactation cookies, and increase hydration without relief  - Symptoms likely side effect of Nexplanon, but pt does not want to remove Nexplanon if can increased milk production  - Will trial short course of Reglan 10mg BID x14 days for galactogogues  - RTC in 2 weeks to reassess         Relevant Medications    metoclopramide (REGLAN) 10 mg tablet          Follow-up: Return in about 2 weeks (around 3/18/2022) for abnormal uterine bleeding and decreased milk product      Patient Active Problem List   Diagnosis    Spontaneous vaginal delivery    Third trimester pregnancy    Obesity affecting pregnancy in third trimester    Depression during pregnancy in third trimester    Encounter for counseling regarding contraception    Vaccine counseling    Insulin controlled gestational diabetes mellitus (GDM) in third trimester    Obesity (BMI 30 0-34  9)    Hyperglycemia in pregnancy    Macrosomia affecting management of mother in third trimester    Annual physical exam    Nexplanon insertion    Decreased milk production    Abnormal uterine bleeding         Recent Visits:     Recent Visits  No visits were found meeting these conditions  Showing recent visits within past 7 days and meeting all other requirements  Today's Visits  Date Type Provider Dept   03/04/22 Office Visit MD Parul Dietrich today's visits and meeting all other requirements  Future Appointments  No visits were found meeting these conditions  Showing future appointments within next 150 days and meeting all other requirements      Subjective:     Chief Complaint   Patient presents with    Birth Control Problem     since they placed Oct/Nov Nexplanon shes bleeding a lot(No blood CLots)        HPI:  27 y o  female presents for contraception follow-up with c/o increased vaginal bleeding everyday, saturating approx 4-5 pads per day, after recent Nexplanon insertion on 1/7/22  Denies any pelvic pain, clots, or other vaginal discharge  Pt is aware that AUB is possible possible side effect of Nexplanon and is not concern at this time and does not want to remove Nexplanon  Also c/o decreased milk supply (18oz to 3-4 oz per day) after recent Nexplanon placement  Pt pump every 2 hours and upplementing with [employer] formula but prefer not to  Baby is 10 months old  Denies any breast tenderness, abnormal breast discharge, or other red-flag symptoms  Has tried OTC Munguia's yeast, Flax seed, lactation cookies, and increase hydration without relief  Review of System:     Review of Systems   Constitutional: Negative for activity change, chills, diaphoresis, fatigue, fever and unexpected weight change     HENT: Negative for congestion, ear discharge, ear pain, nosebleeds, postnasal drip, rhinorrhea, sore throat and tinnitus  Eyes: Negative for pain, discharge, redness and visual disturbance  Respiratory: Negative for cough, chest tightness and shortness of breath  Cardiovascular: Negative for chest pain, palpitations and leg swelling  Gastrointestinal: Negative for abdominal pain, blood in stool, constipation, diarrhea, nausea and vomiting  Endocrine: Negative for cold intolerance and heat intolerance  Decreased milk production   Genitourinary: Positive for menstrual problem and vaginal bleeding  Negative for dysuria and hematuria  Musculoskeletal: Negative for arthralgias, back pain and myalgias  Skin: Negative for pallor, rash and wound  Neurological: Negative for dizziness, light-headedness and headaches  Psychiatric/Behavioral: Negative for agitation, behavioral problems and confusion  The patient is not nervous/anxious  A 12-point, complete review of systems was reviewed and negative except as stated above   History:     Past Medical History:   Diagnosis Date    Depression     Diabetes, gestational      Past Surgical History:   Procedure Laterality Date    EAR TUBE REMOVAL      WISDOM TOOTH EXTRACTION      Age 10     Social History   Social History     Substance and Sexual Activity   Alcohol Use No     Social History     Substance and Sexual Activity   Drug Use No     Social History     Tobacco Use   Smoking Status Never Smoker   Smokeless Tobacco Former User       Medications & Allergies:   No Known Allergies    PTA Medications:  No current outpatient medications on file prior to visit  No current facility-administered medications on file prior to visit         Objective & Vitals:   Vitals: /74 (BP Location: Left arm, Patient Position: Sitting, Cuff Size: Large)   Pulse 95   Temp 97 8 °F (36 6 °C) (Temporal)   Resp 18   Ht 5' 1" (1 549 m)   Wt 79 4 kg (175 lb) SpO2 98%   BMI 33 07 kg/m²       Labs, Imagings, and other studies:   I have personally reviewed pertinent reports  No results found for this or any previous visit (from the past 24 hour(s))  No results found  Physical Exam:     Physical Exam  Constitutional:       General: She is not in acute distress  Appearance: Normal appearance  She is well-developed and normal weight  She is not ill-appearing, toxic-appearing or diaphoretic  HENT:      Head: Normocephalic and atraumatic  Right Ear: External ear normal       Left Ear: External ear normal       Nose: Nose normal       Mouth/Throat:      Mouth: Mucous membranes are moist       Pharynx: Oropharynx is clear  Eyes:      General:         Right eye: No discharge  Left eye: No discharge  Extraocular Movements: Extraocular movements intact  Conjunctiva/sclera: Conjunctivae normal       Pupils: Pupils are equal, round, and reactive to light  Cardiovascular:      Rate and Rhythm: Normal rate and regular rhythm  Pulses: Normal pulses  Heart sounds: Normal heart sounds  No murmur heard  No friction rub  No gallop  Pulmonary:      Effort: Pulmonary effort is normal  No respiratory distress  Breath sounds: Normal breath sounds  No stridor  No wheezing, rhonchi or rales  Chest:      Chest wall: No tenderness  Abdominal:      General: Abdomen is flat  Bowel sounds are normal  There is no distension  Palpations: Abdomen is soft  There is no mass  Tenderness: There is no abdominal tenderness  Musculoskeletal:         General: Normal range of motion  Cervical back: Normal range of motion and neck supple  Right lower leg: No edema  Left lower leg: No edema  Skin:     General: Skin is warm  Coloration: Skin is not jaundiced or pale  Neurological:      Mental Status: She is alert and oriented to person, place, and time  Mental status is at baseline     Psychiatric:         Mood and Affect: Mood normal          Behavior: Behavior normal          Thought Content: Thought content normal          Judgment: Judgment normal            Future Labs & Appointments:     FUTURE LABS:  Lab Frequency Next Occurrence       FUTURE CONFIRMED APPOINTMENTS:  No future appointments  Violet Watts MD  PGY-2, Family Medicine  03/04/22  12:35 PM    Dear reader, please be aware that portions of my note contain dictated text  I have done my best to proof-read this note prior to signing  However, there may be occasional unnoticed errors pertaining to "sound-alike" words and/or grammar during my dictation process  If there is any words or information that is unclear or appears erroneous, please kindly let me know and I will clarify and/or addend my notes accordingly  Thank you for your understanding

## 2022-03-04 NOTE — ASSESSMENT & PLAN NOTE
Pt c/o increased vaginal bleeding everyday, saturating approx 4-5 pads per day, after recent Nexplanon insertion on 1/7/2022  - Denies any pelvic pain, clots, or other vaginal discharge  - Pt is aware that AUB is possible possible side effect of Nexplanon and is not concern at this time and does not want to remove Nexplanon  -  patient on side-effect and expect to improve with time  - Ordered CBC to assess for possible anemia  - RTC if bleeding is worse or if prolonged more than 3-4 months

## 2022-04-05 ENCOUNTER — APPOINTMENT (OUTPATIENT)
Dept: LAB | Facility: HOSPITAL | Age: 27
End: 2022-04-05
Payer: MEDICARE

## 2022-04-05 DIAGNOSIS — Z11.59 ENCOUNTER FOR HEPATITIS C SCREENING TEST FOR LOW RISK PATIENT: ICD-10-CM

## 2022-04-05 DIAGNOSIS — E66.9 OBESITY (BMI 30.0-34.9): ICD-10-CM

## 2022-04-05 DIAGNOSIS — N93.9 ABNORMAL UTERINE BLEEDING: ICD-10-CM

## 2022-04-05 DIAGNOSIS — Z00.00 ANNUAL PHYSICAL EXAM: ICD-10-CM

## 2022-04-05 LAB
ANION GAP SERPL CALCULATED.3IONS-SCNC: 5 MMOL/L (ref 4–13)
BUN SERPL-MCNC: 15 MG/DL (ref 5–25)
CALCIUM SERPL-MCNC: 9.3 MG/DL (ref 8.3–10.1)
CHLORIDE SERPL-SCNC: 108 MMOL/L (ref 100–108)
CHOLEST SERPL-MCNC: 166 MG/DL
CO2 SERPL-SCNC: 26 MMOL/L (ref 21–32)
CREAT SERPL-MCNC: 0.66 MG/DL (ref 0.6–1.3)
ERYTHROCYTE [DISTWIDTH] IN BLOOD BY AUTOMATED COUNT: 12.7 % (ref 11.6–15.1)
EST. AVERAGE GLUCOSE BLD GHB EST-MCNC: 123 MG/DL
GFR SERPL CREATININE-BSD FRML MDRD: 120 ML/MIN/1.73SQ M
GLUCOSE P FAST SERPL-MCNC: 98 MG/DL (ref 65–99)
HBA1C MFR BLD: 5.9 %
HCT VFR BLD AUTO: 40.2 % (ref 34.8–46.1)
HCV AB SER QL: NORMAL
HDLC SERPL-MCNC: 41 MG/DL
HGB BLD-MCNC: 13.6 G/DL (ref 11.5–15.4)
LDLC SERPL CALC-MCNC: 107 MG/DL (ref 0–100)
MCH RBC QN AUTO: 29.1 PG (ref 26.8–34.3)
MCHC RBC AUTO-ENTMCNC: 33.8 G/DL (ref 31.4–37.4)
MCV RBC AUTO: 86 FL (ref 82–98)
PLATELET # BLD AUTO: 273 THOUSANDS/UL (ref 149–390)
PMV BLD AUTO: 11.3 FL (ref 8.9–12.7)
POTASSIUM SERPL-SCNC: 3.7 MMOL/L (ref 3.5–5.3)
RBC # BLD AUTO: 4.68 MILLION/UL (ref 3.81–5.12)
SODIUM SERPL-SCNC: 139 MMOL/L (ref 136–145)
TRIGL SERPL-MCNC: 88 MG/DL
WBC # BLD AUTO: 8.03 THOUSAND/UL (ref 4.31–10.16)

## 2022-04-05 PROCEDURE — 80048 BASIC METABOLIC PNL TOTAL CA: CPT

## 2022-04-05 PROCEDURE — 36415 COLL VENOUS BLD VENIPUNCTURE: CPT | Performed by: NURSE PRACTITIONER

## 2022-04-05 PROCEDURE — 83036 HEMOGLOBIN GLYCOSYLATED A1C: CPT | Performed by: NURSE PRACTITIONER

## 2022-04-05 PROCEDURE — 86803 HEPATITIS C AB TEST: CPT

## 2022-04-05 PROCEDURE — 80061 LIPID PANEL: CPT

## 2022-04-05 PROCEDURE — 85027 COMPLETE CBC AUTOMATED: CPT

## 2022-07-07 ENCOUNTER — OFFICE VISIT (OUTPATIENT)
Dept: FAMILY MEDICINE CLINIC | Facility: CLINIC | Age: 27
End: 2022-07-07

## 2022-07-07 VITALS
HEIGHT: 61 IN | OXYGEN SATURATION: 97 % | BODY MASS INDEX: 33.04 KG/M2 | WEIGHT: 175 LBS | DIASTOLIC BLOOD PRESSURE: 80 MMHG | HEART RATE: 84 BPM | SYSTOLIC BLOOD PRESSURE: 120 MMHG | TEMPERATURE: 98 F | RESPIRATION RATE: 20 BRPM

## 2022-07-07 DIAGNOSIS — N64.4 PAIN IN BREAST: Primary | ICD-10-CM

## 2022-07-07 DIAGNOSIS — S93.401S SPRAIN OF RIGHT ANKLE, UNSPECIFIED LIGAMENT, SEQUELA: ICD-10-CM

## 2022-07-07 PROBLEM — S93.401A SPRAIN OF RIGHT ANKLE: Status: ACTIVE | Noted: 2022-07-07

## 2022-07-07 PROCEDURE — 99213 OFFICE O/P EST LOW 20 MIN: CPT | Performed by: FAMILY MEDICINE

## 2022-07-07 NOTE — ASSESSMENT & PLAN NOTE
Pt reports multiple sprain of right ankle  Was evaluated in the ED in [address]  2021  Negative imaging  Able to bear weight with no difficulties  Request referral to PT for strengthening of ankle  Referral provided

## 2022-07-07 NOTE — PROGRESS NOTES
Assessment/Plan:    Pain in breast  - Patient with complain of bilateral breast pain  Onset was about a month ago  Pain/ discomfort is intermittent and resolves on its own  Occurs about 2-3 times weekly  Of note, patient had been breast feeding for 6 months but stopped in march due to decreased milk supply  - Physical exam unremarkable for lumps/mass, skin changes, nipple discharge  Dense breast tissue appreciated  - Unclear Etiology  Could be from stopping breat feeding/engorged breast   -  However, pt's history is significant for breast cancer in her mother  Diagnosed at age 19   - Due to positive family hx, patient request further evaluation  - will order bilateral ultrasound  And will follow up on results    - RTC if symptoms worsen  Sprain of right ankle  Pt reports multiple sprain of right ankle  Was evaluated in the ED in [address]  2021  Negative imaging  Able to bear weight with no difficulties  Request referral to PT for strengthening of ankle  Referral provided  Diagnoses and all orders for this visit:    Pain in breast  -     US breast left limited (diagnostic); Future  -     US breast right limited (diagnostic); Future    Sprain of right ankle, unspecified ligament, sequela  -     Ambulatory Referral to Physical Therapy; Future        Subjective:      Patient ID: Rodrigo Carrillo is a 32 y o  female  Patient presents to the clinic today due to complains of bilateral breast pain  Started a month and half ago  Pain is intermittent last for a couple of hours but resolves on its own  Patient reports having pain/discomfort about  2-3 week  Denies any symptoms including breast lumps/masses, skin changes, nipple discharge  Of note,  Patient  was breast feeding and  stopped breast feeding in March due to decreased milk supply  Her son is now 6 months old  She is unsure if she is still expressing milk and she hasn't tried  History is significant for breast cancer in her mother    She reports that her mother had a breast mass that was cancerous and was taken out  Age of diagnosis 19years old  Mother is alive  She is now cancer free  The following portions of the patient's history were reviewed and updated as appropriate: allergies, current medications, past family history, past medical history, past social history, past surgical history and problem list     Review of Systems   Constitutional: Negative for chills and fever  HENT: Negative for ear pain and sore throat  Eyes: Negative for pain and visual disturbance  Respiratory: Negative for cough and shortness of breath  Cardiovascular: Negative for chest pain and palpitations  Gastrointestinal: Negative for abdominal pain and vomiting  Genitourinary: Negative for dysuria and hematuria  Musculoskeletal: Negative for arthralgias and back pain  Skin: Negative for color change and rash  Neurological: Negative for seizures and syncope  All other systems reviewed and are negative  Objective:      /80 (BP Location: Right arm, Patient Position: Sitting, Cuff Size: Standard)   Pulse 84   Temp 98 °F (36 7 °C) (Temporal)   Resp 20   Ht 5' 1" (1 549 m)   Wt 79 4 kg (175 lb)   SpO2 97%   BMI 33 07 kg/m²          Physical Exam  Vitals reviewed  Constitutional:       General: She is not in acute distress  Appearance: Normal appearance  She is not ill-appearing, toxic-appearing or diaphoretic  HENT:      Head: Normocephalic and atraumatic  Eyes:      General:         Right eye: No discharge  Left eye: No discharge  Conjunctiva/sclera: Conjunctivae normal    Cardiovascular:      Rate and Rhythm: Normal rate and regular rhythm  Pulses: Normal pulses  Heart sounds: Normal heart sounds  Pulmonary:      Effort: Pulmonary effort is normal  No respiratory distress  Breath sounds: Normal breath sounds  No wheezing  Abdominal:      General: Abdomen is flat   Bowel sounds are normal  There is no distension  Palpations: Abdomen is soft  Tenderness: There is no abdominal tenderness  There is no guarding  Genitourinary:     Comments: Breast Exam: Normal bilaerally  Dense breast tissues noted  But no lumps or masses felt  No nipple discharge  Scar from nipple piercing noted  Musculoskeletal:         General: Normal range of motion  Right lower leg: No edema  Left lower leg: No edema  Skin:     General: Skin is warm and dry  Neurological:      Mental Status: She is alert  Mental status is at baseline  Psychiatric:         Mood and Affect: Mood normal          Behavior: Behavior normal          Thought Content:  Thought content normal          Judgment: Judgment normal

## 2022-07-07 NOTE — ASSESSMENT & PLAN NOTE
- Patient with complain of bilateral breast pain  Onset was about a month ago  Pain/ discomfort is intermittent and resolves on its own  Occurs about 2-3 times weekly  Of note, patient had been breast feeding for 6 months but stopped in march due to decreased milk supply  - Physical exam unremarkable for lumps/mass, skin changes, nipple discharge  Dense breast tissue appreciated  - Unclear Etiology  Could be from stopping breat feeding/engorged breast   -  However, pt's history is significant for breast cancer in her mother  Diagnosed at age 19   - Due to positive family hx, patient request further evaluation  - will order bilateral ultrasound  And will follow up on results    - RTC if symptoms worsen

## 2022-07-12 ENCOUNTER — APPOINTMENT (OUTPATIENT)
Dept: URGENT CARE | Age: 27
End: 2022-07-12
Payer: MEDICARE

## 2022-07-12 ENCOUNTER — OFFICE VISIT (OUTPATIENT)
Dept: URGENT CARE | Age: 27
End: 2022-07-12
Payer: MEDICARE

## 2022-07-12 VITALS
DIASTOLIC BLOOD PRESSURE: 79 MMHG | TEMPERATURE: 97.9 F | OXYGEN SATURATION: 98 % | WEIGHT: 178 LBS | BODY MASS INDEX: 33.63 KG/M2 | SYSTOLIC BLOOD PRESSURE: 127 MMHG | RESPIRATION RATE: 20 BRPM | HEART RATE: 86 BPM

## 2022-07-12 DIAGNOSIS — R05.1 ACUTE COUGH: ICD-10-CM

## 2022-07-12 DIAGNOSIS — J02.9 ACUTE PHARYNGITIS, UNSPECIFIED ETIOLOGY: Primary | ICD-10-CM

## 2022-07-12 LAB — S PYO AG THROAT QL: NEGATIVE

## 2022-07-12 PROCEDURE — 87880 STREP A ASSAY W/OPTIC: CPT

## 2022-07-12 PROCEDURE — U0005 INFEC AGEN DETEC AMPLI PROBE: HCPCS

## 2022-07-12 PROCEDURE — 99213 OFFICE O/P EST LOW 20 MIN: CPT

## 2022-07-12 PROCEDURE — 87070 CULTURE OTHR SPECIMN AEROBIC: CPT

## 2022-07-12 PROCEDURE — U0003 INFECTIOUS AGENT DETECTION BY NUCLEIC ACID (DNA OR RNA); SEVERE ACUTE RESPIRATORY SYNDROME CORONAVIRUS 2 (SARS-COV-2) (CORONAVIRUS DISEASE [COVID-19]), AMPLIFIED PROBE TECHNIQUE, MAKING USE OF HIGH THROUGHPUT TECHNOLOGIES AS DESCRIBED BY CMS-2020-01-R: HCPCS

## 2022-07-13 LAB — SARS-COV-2 RNA RESP QL NAA+PROBE: NEGATIVE

## 2022-07-13 NOTE — PATIENT INSTRUCTIONS
Please use throat lozenges, Chloraseptic spray or warm saltwater gargles as needed for relief of sore throat  Please let 24-48 hours free of COVID test and throat culture to results

## 2022-07-13 NOTE — PROGRESS NOTES
[facility]        NAME: Quinton Corral is a 32 y o  female  : 1995    MRN: [id number]  DATE: 2022  TIME: 8:16 PM    Assessment and Plan   Acute pharyngitis, unspecified etiology [J02 9]  1  Acute pharyngitis, unspecified etiology  POCT rapid strepA    COVID Only - Collected at [employer] or [facility]    Throat culture   2  Acute cough  COVID Only - Collected at [facility] or [facility]       Rapid strep negative, awaiting results throat culture and COVID PCR  Patient to use Chloraseptic spray, throat lozenges and warm saltwater gargles as needed for sore throat  She may trial over-the-counter cough medicine if needed  Patient Instructions     Supportive care as discussed  Follow up with PCP in 3-5 days  Proceed to  ER if symptoms worsen  Chief Complaint     Chief Complaint   Patient presents with    Sore Throat     Sore throat for 2 days  History of Present Illness       Patient presenting for evaluation of sore throat that has been worsening over the past 2 days  She states that she has a mild cough increased mucus production  She denies any chest pain, shortness of breath, fevers or chills  Patient states that her children were recently ill with similar symptoms  Review of Systems   Review of Systems   Constitutional: Negative for chills and fever  HENT: Positive for sore throat  Negative for ear pain  Eyes: Negative for pain and visual disturbance  Respiratory: Positive for cough  Negative for shortness of breath  Cardiovascular: Negative for chest pain and palpitations  Gastrointestinal: Negative for abdominal pain and vomiting  Genitourinary: Negative for dysuria and hematuria  Musculoskeletal: Negative for arthralgias and back pain  Skin: Negative for color change and rash  Neurological: Negative for seizures and syncope  All other systems reviewed and are negative          Current Medications     No current outpatient medications on file  Current Allergies     Allergies as of 07/12/2022    (No Known Allergies)            The following portions of the patient's history were reviewed and updated as appropriate: allergies, current medications, past family history, past medical history, past social history, past surgical history and problem list      Past Medical History:   Diagnosis Date    Depression     Diabetes, gestational        Past Surgical History:   Procedure Laterality Date    EAR TUBE REMOVAL      WISDOM TOOTH EXTRACTION      Age 10       Family History   Problem Relation Age of Onset    Thyroid disease Mother     No Known Problems Father     No Known Problems Sister     No Known Problems Daughter     No Known Problems Sister     Diabetes Family     Heart disease Family          Medications have been verified  Objective   /79   Pulse 86   Temp 97 9 °F (36 6 °C) (Temporal)   Resp 20   Wt 80 7 kg (178 lb)   LMP 06/16/2022 (Approximate)   SpO2 98%   BMI 33 63 kg/m²        Physical Exam     Physical Exam  Vitals and nursing note reviewed  Constitutional:       General: She is not in acute distress  Appearance: Normal appearance  She is well-developed and normal weight  She is not ill-appearing  HENT:      Head: Normocephalic and atraumatic  Right Ear: Tympanic membrane normal       Left Ear: Tympanic membrane normal       Nose: Nose normal  No congestion or rhinorrhea  Mouth/Throat:      Mouth: Mucous membranes are moist       Pharynx: Oropharynx is clear  Posterior oropharyngeal erythema present  No oropharyngeal exudate  Tonsils: No tonsillar exudate or tonsillar abscesses  1+ on the right  1+ on the left  Eyes:      General:         Right eye: No discharge  Left eye: No discharge  Extraocular Movements: Extraocular movements intact  Conjunctiva/sclera: Conjunctivae normal       Pupils: Pupils are equal, round, and reactive to light  Cardiovascular:      Rate and Rhythm: Normal rate and regular rhythm  Pulses: Normal pulses  Heart sounds: Normal heart sounds  No murmur heard  No friction rub  No gallop  Pulmonary:      Effort: No respiratory distress  Breath sounds: Normal breath sounds  No wheezing, rhonchi or rales  Abdominal:      General: Abdomen is flat  Bowel sounds are normal       Palpations: Abdomen is soft  Tenderness: There is no abdominal tenderness  There is no guarding or rebound  Musculoskeletal:         General: No tenderness  Normal range of motion  Cervical back: Normal range of motion and neck supple  No tenderness  Skin:     General: Skin is warm and dry  Capillary Refill: Capillary refill takes less than 2 seconds  Neurological:      General: No focal deficit present  Mental Status: She is alert and oriented to person, place, and time     Psychiatric:         Mood and Affect: Mood normal          Behavior: Behavior normal

## 2022-07-14 LAB — BACTERIA THROAT CULT: NORMAL

## 2022-07-15 ENCOUNTER — OFFICE VISIT (OUTPATIENT)
Dept: FAMILY MEDICINE CLINIC | Facility: CLINIC | Age: 27
End: 2022-07-15

## 2022-07-15 ENCOUNTER — TELEPHONE (OUTPATIENT)
Dept: ADMINISTRATIVE | Facility: OTHER | Age: 27
End: 2022-07-15

## 2022-07-15 VITALS
DIASTOLIC BLOOD PRESSURE: 65 MMHG | WEIGHT: 175 LBS | BODY MASS INDEX: 33.04 KG/M2 | OXYGEN SATURATION: 98 % | HEART RATE: 78 BPM | TEMPERATURE: 97.5 F | SYSTOLIC BLOOD PRESSURE: 105 MMHG | RESPIRATION RATE: 16 BRPM | HEIGHT: 61 IN

## 2022-07-15 DIAGNOSIS — J02.9 PHARYNGITIS, UNSPECIFIED ETIOLOGY: Primary | ICD-10-CM

## 2022-07-15 PROCEDURE — 99212 OFFICE O/P EST SF 10 MIN: CPT | Performed by: FAMILY MEDICINE

## 2022-07-15 NOTE — TELEPHONE ENCOUNTER
----- Message from [facility] sent at 7/15/2022 10:47 AM EDT -----  Regarding: [id number]-[id number] Vaccines  07/15/22 10:47 AM    Hello, our patient Carmen Hatch has had the COVID-19 vaccines completed/performed  Please assist in updating the patient chart by making an External outreach to [address] [place] [address] located in [facility] ([address])      Thank you,  [facility]  SABRINA Butler

## 2022-07-15 NOTE — PATIENT INSTRUCTIONS
Pharyngitis   WHAT YOU NEED TO KNOW:   Pharyngitis, or sore throat, is inflammation of the tissues and structures in your pharynx (throat)  Pharyngitis is most often caused by bacteria  It may also be caused by a cold or flu virus  Other causes include smoking, allergies, or acid reflux  DISCHARGE INSTRUCTIONS:   Call 911 for any of the following: You have trouble breathing or swallowing because your throat is swollen or sore  Return to the emergency department if:   You are drooling because it hurts too much to swallow  Your fever is higher than 102? F (39?C) or lasts longer than 3 days  You are confused  You taste blood in your throat  Contact your healthcare provider if:   Your throat pain gets worse  You have a painful lump in your throat that does not go away after 5 days  Your symptoms do not improve after 5 days  You have questions or concerns about your condition or care  Medicines:  Viral pharyngitis will go away on its own without treatment  Your sore throat should start to feel better in 3 to 5 days for both viral and bacterial infections  You may need any of the following:  Antibiotics  treat a bacterial infection  NSAIDs , such as ibuprofen, help decrease swelling, pain, and fever  NSAIDs can cause stomach bleeding or kidney problems in certain people  If you take blood thinner medicine, always ask your healthcare provider if NSAIDs are safe for you  Always read the medicine label and follow directions  Acetaminophen  decreases pain and fever  It is available without a doctor's order  Ask how much to take and how often to take it  Follow directions  Acetaminophen can cause liver damage if not taken correctly  Take your medicine as directed  Contact your healthcare provider if you think your medicine is not helping or if you have side effects  Tell him or her if you are allergic to any medicine  Keep a list of the medicines, vitamins, and herbs you take   Include the amounts, and when and why you take them  Bring the list or the pill bottles to follow-up visits  Carry your medicine list with you in case of an emergency  Manage your symptoms:   Gargle salt water  Mix ¼ teaspoon salt in an 8 ounce glass of warm water and gargle  This may help decrease swelling in your throat  Drink liquids as directed  You may need to drink more liquids than usual  Liquids may help soothe your throat and prevent dehydration  Ask how much liquid to drink each day and which liquids are best for you  Use a cool-steam humidifier  to help moisten the air in your room and calm your cough  Soothe your throat  with cough drops, ice, soft foods, or popsicles  Prevent the spread of pharyngitis:  Cover your mouth and nose when you cough or sneeze  Do not share food or drinks  Wash your hands often  Use soap and water  If soap and water are unavailable, use an alcohol based hand   Follow up with your doctor as directed:  Write down your questions so you remember to ask them during your visits  © Copyright [employer] 2022 Information is for End User's use only and may not be sold, redistributed or otherwise used for commercial purposes  All illustrations and images included in CareNotes® are the copyrighted property of A D A M , Inc  or Dalia Fragoso  The above information is an  only  It is not intended as medical advice for individual conditions or treatments  Talk to your doctor, nurse or pharmacist before following any medical regimen to see if it is safe and effective for you

## 2022-07-15 NOTE — ASSESSMENT & PLAN NOTE
Presenting for 5-day history of sore throat, some cough/congestion  Previously seen in urgent care on 7/12 - Rapid strep, throat culture, and COVID test negative   Reports her throat is still sore, usually worse at night   Able to swallow, maintaining good PO intake  Positive sick contacts - her children were initially sick first   - Likely viral etiology, expect symptoms to last for a few days  - Continue symptomatic care  - Follow up as needed

## 2022-07-15 NOTE — TELEPHONE ENCOUNTER
Upon review of the In Basket request we have found that we are unable to obtain a copy of the exclusion documentation requested because [facility] located in [address] [place] [address] on 2021  Per Judit Padilla at [facility] on [facility] patients is not coming up on their files  additional questions or concerns should be emailed to the Practice Liaisons via [email or web address] email, please do not reply via In Basket      Thank you  Chiquita Ayala

## 2022-07-15 NOTE — PROGRESS NOTES
CLINIC VISIT NOTE - [address] Crain 32 y o  female MRN: [id number]  Encounter: [id number],  Primary Care Provider: Sabine Richmond MD      Date: 07/15/22    Assessments & Plans:     Problem List Items Addressed This Visit        Digestive    Pharyngitis - Primary     Presenting for 5-day history of sore throat, some cough/congestion  Previously seen in urgent care on 7/12 - Rapid strep, throat culture, and COVID test negative   Reports her throat is still sore, usually worse at night  Able to swallow, maintaining good PO intake  Positive sick contacts - her children were initially sick first   - Likely viral etiology, expect symptoms to last for a few days  - Continue symptomatic care  - Follow up as needed           Relevant Orders    COVID Only- Office Collect    POCT rapid strepA          Patient Active Problem List   Diagnosis    Spontaneous vaginal delivery    Third trimester pregnancy    Obesity affecting pregnancy in third trimester    Depression during pregnancy in third trimester    Encounter for counseling regarding contraception    Vaccine counseling    Insulin controlled gestational diabetes mellitus (GDM) in third trimester    Obesity (BMI 30 0-34  9)    Hyperglycemia in pregnancy    Macrosomia affecting management of mother in third trimester    Annual physical exam    Nexplanon insertion    Decreased milk production    Abnormal uterine bleeding    Pain in breast    Sprain of right ankle    Pharyngitis         Recent Visits:     Recent Visits  No visits were found meeting these conditions  Showing recent visits within past 7 days and meeting all other requirements  Today's Visits  Date Type Provider Dept   07/15/22 Office Visit Shirley Albrecht MD [facility] today's visits and meeting all other requirements  Future Appointments  No visits were found meeting these conditions    Showing future appointments within next 150 days and meeting all other requirements      Subjective:     Chief Complaint   Patient presents with    Sore Throat     Started about a week ago, pt is taking dayquil and drinking tea which is not helping       HPI:  32year old female presenting for sore-throat, cough for the past week  Reports positive sick contacts in her children, who were sick last week with similar symptoms  Has been trying DayQuil and ibuprofen, drinking tea, marielle and honey which have been providing mild relief  The sore throat tends to get worse at night (L > R) but she is able to swallow ok, having good PO intake  No GI symptoms  No history of asthma, COPD, and not on inhalers  Review of System:     Review of systems was reviewed and negative unless stated above in HPI/24-hour events     History:     Past Medical History:   Diagnosis Date    Depression     Diabetes, gestational      Past Surgical History:   Procedure Laterality Date    EAR TUBE REMOVAL      WISDOM TOOTH EXTRACTION      Age 10     Social History   Social History     Substance and Sexual Activity   Alcohol Use No     Social History     Substance and Sexual Activity   Drug Use No     Social History     Tobacco Use   Smoking Status Never Smoker   Smokeless Tobacco Former User       Medications & Allergies:   No Known Allergies    PTA Medications:  No current outpatient medications on file prior to visit  No current facility-administered medications on file prior to visit  Objective & Vitals:   Vitals: /65 (BP Location: Right arm, Patient Position: Sitting, Cuff Size: Large)   Pulse 78   Temp 97 5 °F (36 4 °C) (Temporal)   Resp 16   Ht 5' 1" (1 549 m)   Wt 79 4 kg (175 lb)   LMP 06/16/2022 (Approximate)   SpO2 98%   BMI 33 07 kg/m²       Labs & Imagings:   I have personally reviewed pertinent reports  No results found for this or any previous visit (from the past 24 hour(s))  Physical Exam:     Physical Exam  Vitals reviewed     Constitutional: General: She is not in acute distress  Appearance: Normal appearance  She is not ill-appearing  HENT:      Head: Normocephalic and atraumatic  Nose: Nose normal       Mouth/Throat:      Lips: No lesions  Mouth: Mucous membranes are dry  Pharynx: Oropharynx is clear  Uvula midline  Posterior oropharyngeal erythema present  No pharyngeal swelling, oropharyngeal exudate or uvula swelling  Tonsils: No tonsillar exudate or tonsillar abscesses  Eyes:      Extraocular Movements: Extraocular movements intact  Conjunctiva/sclera: Conjunctivae normal    Cardiovascular:      Rate and Rhythm: Normal rate and regular rhythm  Heart sounds: Normal heart sounds  No murmur heard  Pulmonary:      Effort: Pulmonary effort is normal  No respiratory distress  Breath sounds: Normal breath sounds  Abdominal:      General: Abdomen is flat  Palpations: Abdomen is soft  Tenderness: There is no abdominal tenderness  Musculoskeletal:         General: Normal range of motion  Cervical back: Normal range of motion  Skin:     General: Skin is warm and dry  Neurological:      Mental Status: She is alert and oriented to person, place, and time  Psychiatric:         Mood and Affect: Mood normal          Behavior: Behavior normal            Future Labs & Appointments:     FUTURE LABS:  Lab Frequency Next Occurrence   US breast left limited (diagnostic) Once 07/07/2022   US breast right limited (diagnostic) Once 07/07/2022       FUTURE CONFIRMED APPOINTMENTS:  No future appointments  Renetta Montanez MD  PGY-2, Family Medicine  07/15/22  1:12 PM      Dear reader, please be aware that portions of my note contain dictated text  I have done my best to proof-read this note prior to signing  However, there may be occasional unnoticed errors pertaining to "sound-alike" words and/or grammar during my dictation process   If there is any words or information that is unclear or appears erroneous, please kindly let me know and I will clarify and/or addend my notes accordingly  Thank you for your understanding

## 2022-07-18 ENCOUNTER — TELEPHONE (OUTPATIENT)
Dept: FAMILY MEDICINE CLINIC | Facility: CLINIC | Age: 27
End: 2022-07-18

## 2022-07-19 NOTE — TELEPHONE ENCOUNTER
Called patient and verify, patient states we do not collect the specimen on 07/15/22 appointment day  Called [facility] to cancelled order because we do not collect specimen at that time

## 2023-03-30 ENCOUNTER — OFFICE VISIT (OUTPATIENT)
Dept: OBGYN CLINIC | Facility: CLINIC | Age: 28
End: 2023-03-30

## 2023-03-30 VITALS
SYSTOLIC BLOOD PRESSURE: 116 MMHG | DIASTOLIC BLOOD PRESSURE: 78 MMHG | BODY MASS INDEX: 32.28 KG/M2 | WEIGHT: 171 LBS | HEIGHT: 61 IN

## 2023-03-30 DIAGNOSIS — N76.0 BV (BACTERIAL VAGINOSIS): Primary | ICD-10-CM

## 2023-03-30 DIAGNOSIS — B96.89 BV (BACTERIAL VAGINOSIS): Primary | ICD-10-CM

## 2023-03-30 RX ORDER — METRONIDAZOLE 500 MG/1
TABLET ORAL
Qty: 14 TABLET | Refills: 1 | Status: SHIPPED | OUTPATIENT
Start: 2023-03-30 | End: 2023-04-06

## 2023-03-30 NOTE — PATIENT INSTRUCTIONS
Patient was informed of probable bacterial vaginosis  We will start her on Flagyl 500 mg twice daily for 7 days  We will await the results of the molecular culture to see if it in the office is present  She is contemplating having the Nexplanon removed because of irregular bleeding pattern  She will wait 1 more month  It is still having irregular patterns we may consider removal   She may consider the IUD for contraception in the future

## 2023-03-30 NOTE — PROGRESS NOTES
This is a 56-year-old female, she is a  2 para 2 with 2 prior vaginal deliveries  She has a history of gestational diabetes controlled with insulin  Her current method of contraception includes Nexplanon this was placed over a year ago at another facility  She is now have 1 episode of prolonged bleeding  She is contemplating changing her method of contraception  She is also noticing increasing vaginal discharge with odor  Examination shows the positive yellow discharge in the vagina  Suspicious for bacterial vaginosis  A culture was done  We will treat her with Flagyl 500 mg twice daily  We will await the results of the molecular culture if anything else needs to be done  She will keep me informed of her bleeding pattern  She may ask to have the Nexplanon removed and consider the IUD  A brochure about the IUD was given  She should return to my office in 1 month for yearly exam   She will be informed results when they return  Previsit time 5 minutes  Face-to-face time 5 minutes  Post visit and charting 2 minutes ordering prescription or 1 minute

## 2023-03-31 LAB
C GLABRATA DNA VAG QL NAA+PROBE: NEGATIVE
C KRUSEI DNA VAG QL NAA+PROBE: NEGATIVE
CANDIDA SP 6 PNL VAG NAA+PROBE: NEGATIVE
T VAGINALIS DNA VAG QL NAA+PROBE: NEGATIVE
VAGINOSIS/ITIS DNA PNL VAG PROBE+SIG AMP: POSITIVE

## 2023-09-13 ENCOUNTER — TELEPHONE (OUTPATIENT)
Dept: PSYCHIATRY | Facility: CLINIC | Age: 28
End: 2023-09-13

## 2023-09-13 NOTE — TELEPHONE ENCOUNTER
Patient has been added to the Talk Therapy wait list without a referral.    Insurance: [id number]  [facility] Type:    Commercial []   Medicaid [x]   [place] (if applicable)   Medicare []  Location Preference: [facility]  Provider Preference: Keila Yeager  Virtual: Yes [x] No []    Advised the patient to contact her PCP for a referral.

## 2024-05-08 ENCOUNTER — OFFICE VISIT (OUTPATIENT)
Dept: FAMILY MEDICINE CLINIC | Facility: CLINIC | Age: 29
End: 2024-05-08
Payer: COMMERCIAL

## 2024-05-08 VITALS
HEART RATE: 85 BPM | HEIGHT: 61 IN | TEMPERATURE: 96 F | DIASTOLIC BLOOD PRESSURE: 78 MMHG | SYSTOLIC BLOOD PRESSURE: 108 MMHG | OXYGEN SATURATION: 96 % | BODY MASS INDEX: 34.17 KG/M2 | WEIGHT: 181 LBS

## 2024-05-08 DIAGNOSIS — B35.1 TOENAIL FUNGUS: Primary | ICD-10-CM

## 2024-05-08 DIAGNOSIS — Z76.89 ENCOUNTER TO ESTABLISH CARE: ICD-10-CM

## 2024-05-08 PROBLEM — J02.9 PHARYNGITIS: Status: RESOLVED | Noted: 2022-07-15 | Resolved: 2024-05-08

## 2024-05-08 PROCEDURE — 99203 OFFICE O/P NEW LOW 30 MIN: CPT | Performed by: NURSE PRACTITIONER

## 2024-05-08 RX ORDER — CICLOPIROX 80 MG/ML
SOLUTION TOPICAL
Qty: 6 ML | Refills: 1 | Status: SHIPPED | OUTPATIENT
Start: 2024-05-08

## 2024-05-08 NOTE — PROGRESS NOTES
"Name: Diamond Crain      : 1995      MRN: [id number]  Encounter Provider: CARLY Hummel  Encounter Date: 2024   Encounter department: [facility]    Assessment & Plan     1. Toenail fungus  -     ciclopirox (PENLAC) 8 % solution; Apply topically daily at bedtime    -trial Penlac.  -Return for annual physical.      Depression Screening and Follow-up Plan: Patient was screened for depression during today's encounter. They screened negative with a PHQ-9 score of 0.        Subjective      New patient to establish care. C/o toenail fungus. No other concerns. No significant history, no medications. Due annual physical.      Review of Systems   Constitutional:  Negative for activity change and fever.   Respiratory:  Negative for shortness of breath.    Cardiovascular:  Negative for chest pain.   Gastrointestinal:  Negative for abdominal pain.   Genitourinary:  Negative for difficulty urinating.   Skin:  Positive for color change (nail discoloration-right foot).   Psychiatric/Behavioral:  Negative for dysphoric mood.        No current outpatient medications on file prior to visit.       Objective     /78 (BP Location: Left arm, Patient Position: Sitting, Cuff Size: Standard)   Pulse 85   Temp (!) 96 °F (35.6 °C) (Tympanic)   Ht 5' 1\" (1.549 m)   Wt 82.1 kg (181 lb)   SpO2 96%   BMI 34.20 kg/m²     Physical Exam  Vitals and nursing note reviewed.   Constitutional:       General: She is not in acute distress.     Appearance: Normal appearance. She is not ill-appearing.   HENT:      Head: Normocephalic.   Eyes:      Extraocular Movements: Extraocular movements intact.   Cardiovascular:      Rate and Rhythm: Normal rate and regular rhythm.      Heart sounds: Normal heart sounds.   Pulmonary:      Effort: Pulmonary effort is normal. No respiratory distress.      Breath sounds: Normal breath sounds.   Skin:     General: Skin is warm and dry.      " Coloration: Skin is not pale.      Findings: No erythema.      Comments: 4th toe right foot-thick, discolored   Neurological:      Mental Status: She is alert and oriented to person, place, and time.   Psychiatric:         Mood and Affect: Mood normal.         Behavior: Behavior normal.       CARLY Hummel

## 2024-05-14 ENCOUNTER — TELEPHONE (OUTPATIENT)
Age: 29
End: 2024-05-14

## 2024-05-14 NOTE — TELEPHONE ENCOUNTER
Patient called to schedule derm appt. Offered next available and wait list, patient declined and will go to her PCP.

## 2024-08-16 ENCOUNTER — TELEPHONE (OUTPATIENT)
Age: 29
End: 2024-08-16

## 2024-08-16 NOTE — TELEPHONE ENCOUNTER
"Behavioral Health Outpatient Intake Questions    Referred By   : SELF    Please advise interviewee that they need to answer all questions truthfully to allow for best care, and any misrepresentations of information may affect their ability to be seen at this clinic   => Was this discussed? Yes     If Minor Child (under age 18)    Who is/are the legal guardian(s) of the child?     Is there a custody agreement?      If \"YES\"- Custody orders must be obtained prior to scheduling the first appointment  In addition, Consent to Treatment must be signed by all legal guardians prior to scheduling the first appointment    If \"NO\"- Consent to Treatment must be signed by all legal guardians prior to scheduling the first appointment    Behavioral Health Outpatient Intake History -     Presenting Problem (in patient's own words):     Pt stated that it's \"just life\" and thinks everyone needs therapy especially with the world right now.  Pt stated that she had mild depression prior to pregnancies.  When pt was pregnant with both children, pt experienced  and post partum depression.  Pt stated that she attended some sessions with boyfriend and therapist mentioned that it would benefit pt to attend therapy as well.    Are there any communication barriers for this patient?     No                                               If yes, please describe barriers:   If there is a unique situation, please refer to Escobar Tipton/Kristina Hankins for final determination.    Are you taking any psychiatric medications? No     If \"YES\" -What are they      If \"YES\" -Who prescribes?     Has the Patient previously received outpatient Talk Therapy or Medication Management from [facility]  No        If \"YES\"- When, Where and with Whom?         If \"NO\" -Has Patient received these services elsewhere?       If \"YES\" -When, Where, and with Whom? Therapy in the past for grief (2 sessions, )    Has the Patient abused alcohol or other substances in the " "last 6 months ? No  No concerns of substance abuse are reported.     If \"YES\" -What substance, How much, How often?     If illegal substance: Refer to [facility] (for [facility]) or [facility] Offices.   If Alcohol in excess of 10 drinks per week:  Refer to [facility] (for [facility]) or [facility] Offices    Legal History-     Is this treatment court ordered? No   If \"yes \"send to :  Talk Therapy : Send to Escobar Tipton/Kristina Hankins for final determination   Med Management: Send to Dr Dickey for final determination     Has the Patient been convicted of a felony?  No   If \"Yes\" send to -When, What?  Talk Therapy: Send to Escobar Tipton/Kristina Hankins for final determination   Med Management: Send to Dr Dickey for final determination     ACCEPTED as a patient Yes  If \"Yes\" Appointment Date:     Pt would like to have an appt at [facility]. Pt will callback in Sept to schedule an appt.    Referred Elsewhere? No  If “Yes” - (Where? Ex: [facility], [facility], [facility], [facility], etc.)       Name of Insurance Co: [facility] [employer] [facility]  Insurance ID#[id number]   Insurance Phone #  If ins is primary or secondary? PRIMARY  If patient is a minor, parents information such as Name, D.O.B of guarantor.  "

## 2024-08-16 NOTE — TELEPHONE ENCOUNTER
Contacted patient for Talk Therapy  to verify needs of services in attempts to offer patient an appointment at Available Office. Writer verified Full Name, , Callback Number, Address, and Insurance. Writer spoke with pt stated still interested in being scheduled.    1st call attempt

## 2024-09-19 ENCOUNTER — ANNUAL EXAM (OUTPATIENT)
Dept: OBGYN CLINIC | Facility: CLINIC | Age: 29
End: 2024-09-19
Payer: COMMERCIAL

## 2024-09-19 VITALS — DIASTOLIC BLOOD PRESSURE: 74 MMHG | WEIGHT: 175 LBS | SYSTOLIC BLOOD PRESSURE: 112 MMHG | BODY MASS INDEX: 33.07 KG/M2

## 2024-09-19 DIAGNOSIS — Z01.419 WOMEN'S ANNUAL ROUTINE GYNECOLOGICAL EXAMINATION: Primary | ICD-10-CM

## 2024-09-19 PROCEDURE — S0612 ANNUAL GYNECOLOGICAL EXAMINA: HCPCS | Performed by: OBSTETRICS & GYNECOLOGY

## 2024-09-19 PROCEDURE — G0145 SCR C/V CYTO,THINLAYER,RESCR: HCPCS | Performed by: PATHOLOGY

## 2024-09-19 PROCEDURE — G0476 HPV COMBO ASSAY CA SCREEN: HCPCS | Performed by: OBSTETRICS & GYNECOLOGY

## 2024-09-19 NOTE — PATIENT INSTRUCTIONS
Patient was informed of a stable GYN examination.  A Pap smear was performed.  She will make arranges for removal of the Nexplanon.  She is contemplating another pregnancy soon.  She does have a history of gestational diabetes.  She will be followed carefully.

## 2024-09-19 NOTE — PROGRESS NOTES
Ambulatory Visit  Name: Diamond Crain      : 1995      MRN: [id number]  Encounter Provider: Gavin Sheppard MD  Encounter Date: 2024   Encounter department: OB GYN [facility]    Assessment & Plan  Women's annual routine gynecological examination       The patient was informed of a stable GYN examination.  A Pap smear was performed.  She is not happy with her weight she is going try to work harder lose more.  With diet and exercise.  She is taking a having of the baby.  She has a history of gestational diabetes.  Her last child weighed over 9 pounds.  She feels safe at home.  She sees a dentist on a regular basis.  Denies any prior depression or anxiety.  There are no new major family illnesses reported at this time.  She will need a Pap smear today.  A Pap smear was performed.  She will make arrangements for removal of the Nexplanon.    History of Present Illness     Diamond Crain is a 29 y.o. female who presents for annual GYN examination.  She is a  2 para 2 with 2 prior vaginal deliveries.  Her children's ages are 3 and 8 years of age.  Her current method of contraception includes Nexplanon.  She is due to have this removed soon.  She is contemplating another pregnancy in the future.  She will have the Nexplanon removed at her decision.  She feels safe at home.  She sees a dentist on a regular basis.  Denies any prior depression or anxiety.  She is not happy with her weight has been working out.  There is no problem with intimacy.  She will need to have a Pap smear.  She declines STD screening.  There are no new major family illnesses to report.  Currently not using any prescription medication except for the Nexplanon.      Review of Systems   All other systems reviewed and are negative.          Objective     /74   Wt 79.4 kg (175 lb)   LMP 2024 (Exact Date)   BMI 33.07 kg/m²     Physical Exam  Vitals reviewed. Exam conducted with a chaperone present.    Constitutional:       Appearance: Normal appearance. She is normal weight.   HENT:      Head: Normocephalic and atraumatic.      Nose: Nose normal.      Mouth/Throat:      Mouth: Mucous membranes are moist.   Eyes:      Extraocular Movements: Extraocular movements intact.      Pupils: Pupils are equal, round, and reactive to light.   Cardiovascular:      Rate and Rhythm: Normal rate and regular rhythm.      Pulses: Normal pulses.      Heart sounds: Normal heart sounds.   Pulmonary:      Effort: Pulmonary effort is normal.      Breath sounds: Normal breath sounds.   Chest:   Breasts:     Breasts are symmetrical.      Right: Normal.      Left: Normal.   Abdominal:      General: Abdomen is flat. Bowel sounds are normal.      Palpations: Abdomen is soft. There is no hepatomegaly, splenomegaly, mass or pulsatile mass.      Tenderness: There is no abdominal tenderness.      Hernia: No hernia is present. There is no hernia in the umbilical area, ventral area, left inguinal area, right femoral area, left femoral area or right inguinal area.   Genitourinary:     General: Normal vulva.      Pubic Area: No rash or pubic lice.       Labia:         Right: No rash, tenderness, lesion or injury.         Left: No rash, tenderness, lesion or injury.       Urethra: No prolapse, urethral pain, urethral swelling or urethral lesion.      Vagina: Normal. No signs of injury and foreign body. No vaginal discharge, erythema, tenderness, bleeding, lesions or prolapsed vaginal walls.      Cervix: Normal.      Uterus: Normal.       Adnexa: Right adnexa normal.      Rectum: Normal.      Comments: The external genitalia are within normal limits.  The vagina is clean.  Cervix is parous but closed.  Uterus is anterior normal size.  A Pap smear was performed.  There is no evidence of prolapse.  The adnexa is clear bilaterally.  Musculoskeletal:         General: Normal range of motion.      Cervical back: Normal range of motion and neck supple.    Lymphadenopathy:      Upper Body:      Right upper body: No supraclavicular adenopathy.      Left upper body: No supraclavicular adenopathy.      Lower Body: No right inguinal adenopathy. No left inguinal adenopathy.   Skin:     General: Skin is warm and dry.   Neurological:      General: No focal deficit present.      Mental Status: She is alert and oriented to person, place, and time.   Psychiatric:         Mood and Affect: Mood normal.         Behavior: Behavior normal.         Thought Content: Thought content normal.

## 2024-09-20 LAB
HPV HR 12 DNA CVX QL NAA+PROBE: NEGATIVE
HPV16 DNA CVX QL NAA+PROBE: NEGATIVE
HPV18 DNA CVX QL NAA+PROBE: NEGATIVE

## 2024-09-26 LAB
LAB AP GYN PRIMARY INTERPRETATION: NORMAL
Lab: NORMAL
PATH INTERP SPEC-IMP: NORMAL

## 2024-10-16 ENCOUNTER — OFFICE VISIT (OUTPATIENT)
Dept: FAMILY MEDICINE CLINIC | Facility: CLINIC | Age: 29
End: 2024-10-16
Payer: COMMERCIAL

## 2024-10-16 VITALS
TEMPERATURE: 99.1 F | WEIGHT: 176 LBS | SYSTOLIC BLOOD PRESSURE: 110 MMHG | HEIGHT: 61 IN | DIASTOLIC BLOOD PRESSURE: 80 MMHG | BODY MASS INDEX: 33.23 KG/M2 | RESPIRATION RATE: 16 BRPM

## 2024-10-16 DIAGNOSIS — Z00.00 ANNUAL PHYSICAL EXAM: Primary | ICD-10-CM

## 2024-10-16 DIAGNOSIS — Z13.21 ENCOUNTER FOR SCREENING FOR NUTRITIONAL DISORDER: ICD-10-CM

## 2024-10-16 DIAGNOSIS — Z13.228 SCREENING FOR METABOLIC DISORDER: ICD-10-CM

## 2024-10-16 DIAGNOSIS — B35.1 TOENAIL FUNGUS: ICD-10-CM

## 2024-10-16 DIAGNOSIS — Z13.29 SCREENING FOR THYROID DISORDER: ICD-10-CM

## 2024-10-16 DIAGNOSIS — Z13.0 SCREENING FOR BLOOD DISEASE: ICD-10-CM

## 2024-10-16 DIAGNOSIS — Z13.220 SCREENING FOR LIPID DISORDERS: ICD-10-CM

## 2024-10-16 DIAGNOSIS — Z13.1 SCREENING FOR DIABETES MELLITUS: ICD-10-CM

## 2024-10-16 DIAGNOSIS — E66.811 OBESITY (BMI 30.0-34.9): ICD-10-CM

## 2024-10-16 PROCEDURE — 99395 PREV VISIT EST AGE 18-39: CPT | Performed by: STUDENT IN AN ORGANIZED HEALTH CARE EDUCATION/TRAINING PROGRAM

## 2024-10-16 NOTE — PROGRESS NOTES
Adult Annual Physical  Name: Diamond Crain      : 1995      MRN: [id number]  Encounter Provider: Yohana Damian MD  Encounter Date: 10/16/2024   Encounter department: [facility]    Assessment & Plan  Screening for metabolic disorder    Orders:    Comprehensive metabolic panel; Future    Screening for blood disease    Orders:    CBC and differential; Future    Screening for lipid disorders    Orders:    Lipid panel; Future    Screening for diabetes mellitus    Orders:    Hemoglobin A1C; Future    Screening for thyroid disorder    Orders:    TSH, 3rd generation with Free T4 reflex; Future    Encounter for screening for nutritional disorder    Orders:    Vitamin D 25 hydroxy; Future    Toenail fungus    Orders:    Ambulatory Referral to Podiatry; Future    Obesity (BMI 30.0-34.9)    Orders:    Ambulatory Referral to Weight Management; Future    Annual physical exam         Immunizations and preventive care screenings were discussed with patient today. Appropriate education was printed on patient's after visit summary.    Counseling:  Exercise: the importance of regular exercise/physical activity was discussed. Recommend exercise 3-5 times per week for at least 30 minutes.          History of Present Illness     Adult Annual Physical:  Patient presents for annual physical. Patient is a 29-year-old female who presents to the office today to establish care and annual physical exam.  Patient reports she was seen by previous provider earlier in the year for toenail fungus and was prescribed topical nail solution however continues to have fungus and requesting for referral to podiatry.  Patient also interested in weight management as she was previously on Wegovy did note some improvement however would like for further assistance with weight loss..     Diet and Physical Activity:    - Exercise: moderate cardiovascular exercise.    Depression Screening:    - PHQ-9  "Score: 0    Review of Systems   Constitutional:  Negative for appetite change and fatigue.   HENT:  Negative for congestion.    Respiratory:  Negative for chest tightness and shortness of breath.    Cardiovascular:  Negative for chest pain.   Gastrointestinal:  Negative for abdominal pain.   Genitourinary:  Negative for dysuria.   Neurological:  Negative for dizziness, light-headedness and headaches.     Past Medical History   Past Medical History:   Diagnosis Date    Depression     Diabetes, gestational      Past Surgical History:   Procedure Laterality Date    EAR TUBE REMOVAL      WISDOM TOOTH EXTRACTION      Age 14     Family History   Problem Relation Age of Onset    Thyroid disease Mother     No Known Problems Father     No Known Problems Sister     No Known Problems Daughter     No Known Problems Sister     Diabetes Family     Heart disease Family      Current Outpatient Medications on File Prior to Visit   Medication Sig Dispense Refill    [DISCONTINUED] ciclopirox (PENLAC) 8 % solution Apply topically daily at bedtime (Patient not taking: Reported on 9/19/2024) 6 mL 1     No current facility-administered medications on file prior to visit.   No Known Allergies   Current Outpatient Medications on File Prior to Visit   Medication Sig Dispense Refill    [DISCONTINUED] ciclopirox (PENLAC) 8 % solution Apply topically daily at bedtime (Patient not taking: Reported on 9/19/2024) 6 mL 1     No current facility-administered medications on file prior to visit.      Social History     Tobacco Use    Smoking status: Never    Smokeless tobacco: Former   Vaping Use    Vaping status: Every Day   Substance and Sexual Activity    Alcohol use: No    Drug use: No    Sexual activity: Not Currently     Partners: Male     Birth control/protection: None       Objective     /80   Temp 99.1 °F (37.3 °C) (Temporal)   Resp 16   Ht 5' 1\" (1.549 m)   Wt 79.8 kg (176 lb)   LMP 08/14/2024 (Exact Date)   BMI 33.25 kg/m² "     Physical Exam  Vitals reviewed.   Constitutional:       General: She is not in acute distress.  HENT:      Head: Normocephalic and atraumatic.      Right Ear: Tympanic membrane, ear canal and external ear normal.      Left Ear: Tympanic membrane, ear canal and external ear normal.      Nose: Nose normal.      Mouth/Throat:      Mouth: Mucous membranes are moist.      Pharynx: Oropharynx is clear.   Eyes:      Extraocular Movements: Extraocular movements intact.      Conjunctiva/sclera: Conjunctivae normal.   Cardiovascular:      Rate and Rhythm: Normal rate and regular rhythm.      Pulses: Normal pulses.      Heart sounds: Normal heart sounds.   Pulmonary:      Effort: Pulmonary effort is normal.      Breath sounds: Normal breath sounds.   Abdominal:      Palpations: Abdomen is soft.      Tenderness: There is no abdominal tenderness.   Musculoskeletal:      Right lower leg: No edema.      Left lower leg: No edema.   Neurological:      Mental Status: She is alert.   Psychiatric:         Mood and Affect: Mood normal.       Administrative Statements   I have spent a total time of 30 minutes in caring for this patient on the day of the visit/encounter including Prognosis, Risks and benefits of tx options, Patient and family education, Impressions, Counseling / Coordination of care, and Obtaining or reviewing history  .

## 2024-10-28 ENCOUNTER — TELEPHONE (OUTPATIENT)
Age: 29
End: 2024-10-28

## 2024-10-28 DIAGNOSIS — N76.0 BV (BACTERIAL VAGINOSIS): Primary | ICD-10-CM

## 2024-10-28 DIAGNOSIS — B96.89 BV (BACTERIAL VAGINOSIS): Primary | ICD-10-CM

## 2024-10-28 RX ORDER — METRONIDAZOLE 500 MG/1
500 TABLET ORAL EVERY 12 HOURS SCHEDULED
Qty: 14 TABLET | Refills: 0 | Status: SHIPPED | OUTPATIENT
Start: 2024-10-28 | End: 2024-11-04

## 2024-10-28 NOTE — TELEPHONE ENCOUNTER
Attempted to call patient to provide results and recommendations per Dr. Ferrara. However, unable to reach patient,  voice mailbox was full and unable to leave voicemail at this time.

## 2024-10-29 NOTE — TELEPHONE ENCOUNTER
Spoke with patient regarding provider. She was advised not to drink alcohol while taking. She would also like to know if any of the provider do labiaplasty.  Attempted to find appointment and call dropped.  No answer on call back.

## 2024-10-31 ENCOUNTER — RA CDI HCC (OUTPATIENT)
Dept: OTHER | Facility: HOSPITAL | Age: 29
End: 2024-10-31

## 2024-10-31 PROBLEM — O36.63X0 MACROSOMIA AFFECTING MANAGEMENT OF MOTHER IN THIRD TRIMESTER: Status: RESOLVED | Noted: 2021-08-11 | Resolved: 2024-10-31

## 2024-10-31 PROBLEM — O24.414 INSULIN CONTROLLED GESTATIONAL DIABETES MELLITUS (GDM) IN THIRD TRIMESTER: Status: RESOLVED | Noted: 2021-07-22 | Resolved: 2024-10-31

## 2024-10-31 PROBLEM — O92.4 DECREASED MILK PRODUCTION: Status: RESOLVED | Noted: 2022-03-04 | Resolved: 2024-10-31

## 2024-10-31 PROBLEM — S93.401A SPRAIN OF RIGHT ANKLE: Status: RESOLVED | Noted: 2022-07-07 | Resolved: 2024-10-31

## 2024-10-31 PROBLEM — O99.213 OBESITY AFFECTING PREGNANCY IN THIRD TRIMESTER: Status: RESOLVED | Noted: 2021-03-30 | Resolved: 2024-10-31

## 2024-10-31 PROBLEM — Z00.00 ANNUAL PHYSICAL EXAM: Status: RESOLVED | Noted: 2021-12-17 | Resolved: 2024-10-31

## 2024-10-31 PROBLEM — O99.810 HYPERGLYCEMIA IN PREGNANCY: Status: RESOLVED | Noted: 2021-07-29 | Resolved: 2024-10-31

## 2024-10-31 PROBLEM — Z34.93 THIRD TRIMESTER PREGNANCY: Status: RESOLVED | Noted: 2021-03-05 | Resolved: 2024-10-31

## 2024-10-31 PROBLEM — O99.343 DEPRESSION DURING PREGNANCY IN THIRD TRIMESTER: Status: RESOLVED | Noted: 2021-04-13 | Resolved: 2024-10-31

## 2024-10-31 PROBLEM — F32.A DEPRESSION DURING PREGNANCY IN THIRD TRIMESTER: Status: RESOLVED | Noted: 2021-04-13 | Resolved: 2024-10-31

## 2024-11-02 ENCOUNTER — APPOINTMENT (OUTPATIENT)
Dept: LAB | Facility: CLINIC | Age: 29
End: 2024-11-02
Payer: COMMERCIAL

## 2024-11-02 DIAGNOSIS — Z13.1 SCREENING FOR DIABETES MELLITUS: ICD-10-CM

## 2024-11-02 DIAGNOSIS — Z13.21 ENCOUNTER FOR SCREENING FOR NUTRITIONAL DISORDER: ICD-10-CM

## 2024-11-02 DIAGNOSIS — Z13.29 SCREENING FOR THYROID DISORDER: ICD-10-CM

## 2024-11-02 DIAGNOSIS — Z13.228 SCREENING FOR METABOLIC DISORDER: ICD-10-CM

## 2024-11-02 DIAGNOSIS — Z13.220 SCREENING FOR LIPID DISORDERS: ICD-10-CM

## 2024-11-02 DIAGNOSIS — Z13.0 SCREENING FOR BLOOD DISEASE: ICD-10-CM

## 2024-11-02 LAB
25(OH)D3 SERPL-MCNC: 13 NG/ML (ref 30–100)
ALBUMIN SERPL BCG-MCNC: 4.1 G/DL (ref 3.5–5)
ALP SERPL-CCNC: 33 U/L (ref 34–104)
ALT SERPL W P-5'-P-CCNC: 13 U/L (ref 7–52)
ANION GAP SERPL CALCULATED.3IONS-SCNC: 4 MMOL/L (ref 4–13)
AST SERPL W P-5'-P-CCNC: 13 U/L (ref 13–39)
BASOPHILS # BLD AUTO: 0.02 THOUSANDS/ÂΜL (ref 0–0.1)
BASOPHILS NFR BLD AUTO: 0 % (ref 0–1)
BILIRUB SERPL-MCNC: 0.25 MG/DL (ref 0.2–1)
BUN SERPL-MCNC: 14 MG/DL (ref 5–25)
CALCIUM SERPL-MCNC: 8.8 MG/DL (ref 8.4–10.2)
CHLORIDE SERPL-SCNC: 106 MMOL/L (ref 96–108)
CHOLEST SERPL-MCNC: 172 MG/DL
CO2 SERPL-SCNC: 29 MMOL/L (ref 21–32)
CREAT SERPL-MCNC: 0.6 MG/DL (ref 0.6–1.3)
EOSINOPHIL # BLD AUTO: 0.2 THOUSAND/ÂΜL (ref 0–0.61)
EOSINOPHIL NFR BLD AUTO: 3 % (ref 0–6)
ERYTHROCYTE [DISTWIDTH] IN BLOOD BY AUTOMATED COUNT: 12.6 % (ref 11.6–15.1)
EST. AVERAGE GLUCOSE BLD GHB EST-MCNC: 114 MG/DL
GFR SERPL CREATININE-BSD FRML MDRD: 121 ML/MIN/1.73SQ M
GLUCOSE P FAST SERPL-MCNC: 119 MG/DL (ref 65–99)
HBA1C MFR BLD: 5.6 %
HCT VFR BLD AUTO: 37.9 % (ref 34.8–46.1)
HDLC SERPL-MCNC: 44 MG/DL
HGB BLD-MCNC: 12.7 G/DL (ref 11.5–15.4)
IMM GRANULOCYTES # BLD AUTO: 0.01 THOUSAND/UL (ref 0–0.2)
IMM GRANULOCYTES NFR BLD AUTO: 0 % (ref 0–2)
LDLC SERPL CALC-MCNC: 117 MG/DL (ref 0–100)
LYMPHOCYTES # BLD AUTO: 1.86 THOUSANDS/ÂΜL (ref 0.6–4.47)
LYMPHOCYTES NFR BLD AUTO: 24 % (ref 14–44)
MCH RBC QN AUTO: 30 PG (ref 26.8–34.3)
MCHC RBC AUTO-ENTMCNC: 33.5 G/DL (ref 31.4–37.4)
MCV RBC AUTO: 89 FL (ref 82–98)
MONOCYTES # BLD AUTO: 0.58 THOUSAND/ÂΜL (ref 0.17–1.22)
MONOCYTES NFR BLD AUTO: 8 % (ref 4–12)
NEUTROPHILS # BLD AUTO: 4.94 THOUSANDS/ÂΜL (ref 1.85–7.62)
NEUTS SEG NFR BLD AUTO: 65 % (ref 43–75)
NONHDLC SERPL-MCNC: 128 MG/DL
NRBC BLD AUTO-RTO: 0 /100 WBCS
PLATELET # BLD AUTO: 268 THOUSANDS/UL (ref 149–390)
PMV BLD AUTO: 11.7 FL (ref 8.9–12.7)
POTASSIUM SERPL-SCNC: 4 MMOL/L (ref 3.5–5.3)
PROT SERPL-MCNC: 6.9 G/DL (ref 6.4–8.4)
RBC # BLD AUTO: 4.24 MILLION/UL (ref 3.81–5.12)
SODIUM SERPL-SCNC: 139 MMOL/L (ref 135–147)
TRIGL SERPL-MCNC: 56 MG/DL
TSH SERPL DL<=0.05 MIU/L-ACNC: 0.67 UIU/ML (ref 0.45–4.5)
WBC # BLD AUTO: 7.61 THOUSAND/UL (ref 4.31–10.16)

## 2024-11-02 PROCEDURE — 82306 VITAMIN D 25 HYDROXY: CPT

## 2024-11-02 PROCEDURE — 85025 COMPLETE CBC W/AUTO DIFF WBC: CPT

## 2024-11-02 PROCEDURE — 80053 COMPREHEN METABOLIC PANEL: CPT

## 2024-11-02 PROCEDURE — 80061 LIPID PANEL: CPT

## 2024-11-02 PROCEDURE — 84443 ASSAY THYROID STIM HORMONE: CPT

## 2024-11-02 PROCEDURE — 83036 HEMOGLOBIN GLYCOSYLATED A1C: CPT

## 2024-11-02 PROCEDURE — 36415 COLL VENOUS BLD VENIPUNCTURE: CPT

## 2024-11-14 ENCOUNTER — APPOINTMENT (OUTPATIENT)
Dept: LAB | Facility: HOSPITAL | Age: 29
End: 2024-11-14
Payer: COMMERCIAL

## 2024-11-14 ENCOUNTER — OFFICE VISIT (OUTPATIENT)
Dept: PODIATRY | Facility: CLINIC | Age: 29
End: 2024-11-14
Payer: COMMERCIAL

## 2024-11-14 VITALS
SYSTOLIC BLOOD PRESSURE: 120 MMHG | HEART RATE: 83 BPM | BODY MASS INDEX: 33.23 KG/M2 | HEIGHT: 61 IN | DIASTOLIC BLOOD PRESSURE: 80 MMHG | WEIGHT: 176 LBS

## 2024-11-14 DIAGNOSIS — B35.1 TOENAIL FUNGUS: ICD-10-CM

## 2024-11-14 PROCEDURE — 99204 OFFICE O/P NEW MOD 45 MIN: CPT | Performed by: PODIATRIST

## 2024-11-14 PROCEDURE — 87102 FUNGUS ISOLATION CULTURE: CPT | Performed by: PODIATRIST

## 2024-11-14 RX ORDER — TERBINAFINE HYDROCHLORIDE 250 MG/1
250 TABLET ORAL DAILY
Qty: 90 TABLET | Refills: 0 | Status: SHIPPED | OUTPATIENT
Start: 2024-11-14 | End: 2025-02-12

## 2024-11-14 NOTE — PROGRESS NOTES
"  Name: Diamond Crain      : 1995      MRN: [id number]  Encounter Provider: Blake Juárez DPM  Encounter Date: 2024   Encounter department: [facility] [place]    Assessment & Plan     1. Toenail fungus  -     Ambulatory Referral to Podiatry  -     terbinafine (LamISIL) 250 mg tablet; Take 1 tablet (250 mg total) by mouth daily Obtain labs for liver function 1 month after starting medication and also at completion of the three month treatment.  -     ALT; Future  -     AST; Future  -     Fungal culture      Today I discussed with the patient the diagnosis of a fungal nail.    Treatment for this can include symptomatic treatment alone, topical medications, oral antifungal medications.  I discussed with the patient risks and benefits of each of these treatment options.    Discussed with patient the duration of time that it takes for resolution of nail infections can be greater than 1 year.  In addition these may not resolve completely.    If resolution does not improve removal of the nail can be considered.  If this still does not improve symptoms, a permanent matrixectomy or removal of the nail could be considered.      Fungal nail culture taken today for the right foot fourth toe.    Return in about 3 months (around 2025).    Subjective     1 year nail thickening of the left 5th toe and the right 4th toe.  Patient has tried 6 months of penlac and did not have improvement.  Has not noticed redness around the skin. Significant other has nail fungus also and is being treated.          Constitutional:  Negative for chills and fever.   Respiratory:  Negative for chest tightness and shortness of breath.    Gastrointestinal:  Negative for nausea and vomiting.     No current outpatient medications on file prior to visit.       Objective     /80   Pulse 83   Ht 5' 1\" (1.549 m)   Wt 79.8 kg (176 lb)   BMI 33.25 kg/m²     Vascular: Intact pedal pulses bilateral DP and " PT.  Neurological: Gross protective sensation intact bilateral  Musculoskeletal: Muscle strength bilateral intact with dorsiflexion, inversion, eversion and plantarflexion.  Dermatological: No open lesions or ulcerations noted bilateral.  Nails 4 on the right shows some thickness and dystrophic changes with discoloration to the nail.  There is also some discoloration of thickness to the left fifth toe.  No changes on skin.

## 2024-11-14 NOTE — PATIENT INSTRUCTIONS
Terbinafine (LamISIL, Terbinex) - (By mouth)   Why this medicine is used:  Treats fungal infections.  Contact a nurse or doctor right away if you have:  Blistering, peeling, scaly, or red skin rash  Unusual bleeding, bruising, or weakness  Changes in behavior or mood, depression  Dark-colored urine or pale stools, yellow skin or eyes  Fever, chills, cough, sore throat, and body aches  Nausea, vomiting, loss of appetite, or pain in the upper stomach     Common side effects:  Change in or loss of taste or smell, mild nausea or vomiting  Headache, stuffy or runny nose, mild skin rash or itching    Terbinafine (By mouth)  Terbinafine (TER-bin-a-feen)  Treats fungal infections.  Brand Name(s):  There may be other brand names for this medicine.  When This Medicine Should Not Be Used:  This medicine is not right for everyone. Do not use it if you had an allergic reaction to terbinafine.  How to Use This Medicine:  Packet, Tablet  Your doctor will tell you how much medicine to use. Do not use more than directed. Take it at the same time every day.  Tablets: Take it with or without food.  Oral granules:  Take it with food.  Sprinkle the oral granules on a spoonful of pudding or other soft, non-acidic food such as mashed potatoes. This mixture must be swallowed immediately without chewing. Do not use applesauce or fruit-based foods. If you need 2 packets of oral granules with each dose, you may sprinkle the content of both packets on 1 spoonful or 2 spoonfuls of non-acidic food.  Take all of the medicine in your prescription to clear up your infection, even if you feel better after the first few doses. It may take several months for your infection to clear up completely and for healthy nails to grow out and replace the infected nails.  This medicine should come with a Medication Guide. Ask your pharmacist for a copy if you do not have one.  Missed dose: Take a dose as soon as you remember. If it is almost time for your next  dose, wait until then and take a regular dose. Do not take extra medicine to make up for a missed dose. If you miss a dose of terbinafine tablets, take it as soon as you can. If your next regular dose is less than 4 hours away, wait until then to use the medicine and skip the missed dose.  Store the medicine in a closed container at room temperature, away from heat, moisture, and direct light.  Drugs and Foods to Avoid:  Ask your doctor or pharmacist before using any other medicine, including over-the-counter medicines, vitamins, and herbal products.  Some foods and medicines can affect how terbinafine works. Tell your doctor if you are using any of the following:  Caffeine (coffee, soda, chocolate)  Cimetidine, cyclosporine, dextromethorphan, fluconazole, ketoconazole, rifampin  Blood pressure medicine  MAO inhibitor  Medicine for heart rhythm problems (including amiodarone, flecainide, propafenone)  Medicine to treat depression  Warnings While Using This Medicine:  Tell your doctor if you are pregnant or breastfeeding, or if you have kidney disease, liver disease, blood problems, lupus, or a weak immune system (such as HIV infection or AIDS).  This medicine may cause the following problems:  Liver problems  Changes to your sense of taste or smell  Depression or other changes in your mood or behavior  Serious skin reactions  This medicine may make your skin more sensitive to sunlight. Wear sunscreen. Do not use sunlamps or tanning beds.  This medicine may make you bleed, bruise, or get infections more easily. Take precautions to prevent illness and injury. Wash your hands often.  Call your doctor if your symptoms do not improve or if they get worse. You may need to take this medicine for several weeks or months before your infection gets better.  Your doctor will do lab tests at regular visits to check on the effects of this medicine. Keep all appointments.  Keep all medicine out of the reach of children. Never  share your medicine with anyone.  Possible Side Effects While Using This Medicine:  Call your doctor right away if you notice any of these side effects:  Allergic reaction: Itching or hives, swelling in your face or hands, swelling or tingling in your mouth or throat, chest tightness, trouble breathing  Blistering, peeling, scaly, or red skin rash  Changes in mood or behavior, depression  Dark urine or pale stools, nausea, vomiting, loss of appetite, stomach pain, yellow skin or eyes  Fever, chills, cough, sore throat, and body aches  Unusual bleeding, bruising, or weakness  If you notice these less serious side effects, talk with your doctor:  Change in or loss of taste or smell  Headache  Stuffy or runny nose  If you notice other side effects that you think are caused by this medicine, tell your doctor.  Call your doctor for medical advice about side effects. You may report side effects to FDA at 2-363-GNO-3494

## 2024-11-15 ENCOUNTER — OFFICE VISIT (OUTPATIENT)
Dept: FAMILY MEDICINE CLINIC | Facility: CLINIC | Age: 29
End: 2024-11-15
Payer: COMMERCIAL

## 2024-11-15 VITALS
RESPIRATION RATE: 14 BRPM | HEIGHT: 61 IN | WEIGHT: 172.6 LBS | HEART RATE: 85 BPM | SYSTOLIC BLOOD PRESSURE: 110 MMHG | TEMPERATURE: 97.6 F | OXYGEN SATURATION: 94 % | BODY MASS INDEX: 32.59 KG/M2 | DIASTOLIC BLOOD PRESSURE: 72 MMHG

## 2024-11-15 DIAGNOSIS — E55.9 VITAMIN D DEFICIENCY: Primary | ICD-10-CM

## 2024-11-15 PROCEDURE — 99213 OFFICE O/P EST LOW 20 MIN: CPT | Performed by: STUDENT IN AN ORGANIZED HEALTH CARE EDUCATION/TRAINING PROGRAM

## 2024-11-15 RX ORDER — ERGOCALCIFEROL 1.25 MG/1
50000 CAPSULE, LIQUID FILLED ORAL WEEKLY
Qty: 12 CAPSULE | Refills: 0 | Status: SHIPPED | OUTPATIENT
Start: 2024-11-15 | End: 2025-02-13

## 2024-11-15 NOTE — PROGRESS NOTES
Name: Diamond Crain      : 1995      MRN: [id number]  Encounter Provider: Yohana Damian MD  Encounter Date: 11/15/2024   Encounter department: [facility]  :  Assessment & Plan  Vitamin D deficiency  Vitamin D deficiency will start patient on vitamin D supplementation for her to take once weekly x 12 weeks and then over-the-counter vitamin D 2000 units daily    Orders:    ergocalciferol (VITAMIN D2) 50,000 units; Take 1 capsule (50,000 Units total) by mouth once a week    Labs reviewed with patient, patient to continue improving diet and exercise has referral to weight management which patient will make appointment for         History of Present Illness     Patient is a 29-year-old female who presents to the office follow-up.  Patient has recently a stab with podiatry for toe fungus and was started on terbinafine which patient picked up yesterday and will be starting today.  Patient denies any acute complaints or concerns at this time      Review of Systems   HENT:  Negative for congestion.    Respiratory:  Negative for chest tightness and shortness of breath.    Cardiovascular:  Negative for chest pain.   Genitourinary:  Negative for dysuria.   Neurological:  Negative for dizziness.     Past Medical History   Past Medical History:   Diagnosis Date    Depression     Diabetes, gestational      Past Surgical History:   Procedure Laterality Date    EAR TUBE REMOVAL      WISDOM TOOTH EXTRACTION      Age 14     Family History   Problem Relation Age of Onset    Thyroid disease Mother     No Known Problems Father     No Known Problems Sister     No Known Problems Daughter     No Known Problems Sister     Diabetes Family     Heart disease Family       reports that she has never smoked. She has quit using smokeless tobacco. She reports that she does not drink alcohol and does not use drugs.  Current Outpatient Medications on File Prior to Visit   Medication Sig  "Dispense Refill    terbinafine (LamISIL) 250 mg tablet Take 1 tablet (250 mg total) by mouth daily Obtain labs for liver function 1 month after starting medication and also at completion of the three month treatment. 90 tablet 0     No current facility-administered medications on file prior to visit.   No Known Allergies   Current Outpatient Medications on File Prior to Visit   Medication Sig Dispense Refill    terbinafine (LamISIL) 250 mg tablet Take 1 tablet (250 mg total) by mouth daily Obtain labs for liver function 1 month after starting medication and also at completion of the three month treatment. 90 tablet 0     No current facility-administered medications on file prior to visit.      Social History     Tobacco Use    Smoking status: Never    Smokeless tobacco: Former   Vaping Use    Vaping status: Some Days    Substances: Nicotine   Substance and Sexual Activity    Alcohol use: No    Drug use: No    Sexual activity: Not Currently     Partners: Male     Birth control/protection: None        Objective   /72   Pulse 85   Temp 97.6 °F (36.4 °C)   Resp 14   Ht 5' 1\" (1.549 m)   Wt 78.3 kg (172 lb 9.6 oz)   SpO2 94%   BMI 32.61 kg/m²      Physical Exam  Vitals reviewed.   HENT:      Head: Normocephalic and atraumatic.   Eyes:      Extraocular Movements: Extraocular movements intact.   Cardiovascular:      Rate and Rhythm: Normal rate and regular rhythm.      Pulses: Normal pulses.   Pulmonary:      Effort: Pulmonary effort is normal.      Breath sounds: Normal breath sounds.   Neurological:      Mental Status: She is alert.   Psychiatric:         Mood and Affect: Mood normal.       Administrative Statements   I have spent a total time of 20 minutes in caring for this patient on the day of the visit/encounter including Risks and benefits of tx options, Instructions for management, Importance of tx compliance, Impressions, Documenting in the medical record, and Obtaining or reviewing history  .   "

## 2024-11-18 LAB — FUNGUS SPEC CULT: NORMAL

## 2024-12-01 LAB — FUNGUS SPEC CULT: NORMAL

## 2024-12-12 ENCOUNTER — TELEPHONE (OUTPATIENT)
Age: 29
End: 2024-12-12

## 2024-12-12 NOTE — TELEPHONE ENCOUNTER
Patient is calling regarding cancelling an appointment.    Date/Time: 12/12/24 Virtual NP 11:30 am    Reason: Her phone will not handle an javid for [employer]. She will get a new phone after the holidays and will be able to download the [employer] javid.    Patient was rescheduled: YES [x] NO []  If yes, when was Patient reschedule for: 3/18/25 with Emely Barksdale at 11:30 am    Patient requesting call back to reschedule: YES [] NO [x]  
Normal for race

## 2024-12-26 LAB — FUNGUS SPEC CULT: ABNORMAL

## 2024-12-27 ENCOUNTER — RESULTS FOLLOW-UP (OUTPATIENT)
Dept: OTHER | Facility: HOSPITAL | Age: 29
End: 2024-12-27

## 2024-12-31 LAB
FUNGUS SPEC CULT: ABNORMAL
FUNGUS SPEC CULT: ABNORMAL

## 2025-01-09 ENCOUNTER — PROCEDURE VISIT (OUTPATIENT)
Dept: OBGYN CLINIC | Facility: CLINIC | Age: 30
End: 2025-01-09
Payer: COMMERCIAL

## 2025-01-09 VITALS
SYSTOLIC BLOOD PRESSURE: 110 MMHG | DIASTOLIC BLOOD PRESSURE: 76 MMHG | HEIGHT: 61 IN | BODY MASS INDEX: 33.04 KG/M2 | WEIGHT: 175 LBS

## 2025-01-09 DIAGNOSIS — Z30.011 ENCOUNTER FOR INITIAL PRESCRIPTION OF CONTRACEPTIVE PILLS: Primary | ICD-10-CM

## 2025-01-09 PROCEDURE — 99213 OFFICE O/P EST LOW 20 MIN: CPT

## 2025-01-09 PROCEDURE — 11982 REMOVE DRUG IMPLANT DEVICE: CPT

## 2025-01-09 RX ORDER — NORETHINDRONE ACETATE AND ETHINYL ESTRADIOL 1MG-20(21)
1 KIT ORAL DAILY
Qty: 84 TABLET | Refills: 0 | Status: SHIPPED | OUTPATIENT
Start: 2025-01-09

## 2025-01-09 NOTE — PROGRESS NOTES
Name: Diamond Crain      : 1995      MRN: [id number]  Encounter Provider: CARLY Bustamante  Encounter Date: 2025   Encounter department: [facility] [place]  :  Assessment & Plan  Encounter for initial prescription of contraceptive pills  Pt desires combo OCPs for birth control. I reviewed with pt estrogen/progesterone combo and how it works to prevent pregnancy. Pt aware of importance of taking her pill daily at the same time every day to maximize effectiveness. Aware to use a back up method of birth control with any missed pills or any antibiotic use. Pt is aware of risks of estrogen use and higher clotting risks. Aware will need continued barrier method use such as condoms to decrease STD risk.   Pt aware of possible side effects including, but not limited to, headaches, acne, bloating, irregular menses, mood changes and breast tenderness.   Pt aware to start with her next menstrual cycle on the fist  of her cycle. Aware of need for back up method of birth control during her first month of pill use.    All questions answered.     Orders:    norethindrone-ethinyl estradiol (Junel FE 1/20) 1-20 MG-MCG per tablet; Take 1 tablet by mouth daily    Remove and insert drug implant        History of Present Illness   HPI  Diamond Crain is a 29 y.o. female who presents for nexplanon removal and birth control discussion.    Periods are irregular with nexplanon.   Denies dysmenorrhea or menorrhagia  Denies any changes in bowel/bladder habits, pelvic pain, bloating, abdominal pain, N/V, changes in appetite, thyroid disease  Denies history of GYN issues  Denies history of diabetes, HTN, migraine with aura, or blood clots  Denies history of smoking  Denies family history or personal history of blood clots or bleeding disorders    History obtained from: patient    Review of Systems  Medical History Reviewed by provider this encounter:     .  Current Outpatient  "Medications on File Prior to Visit   Medication Sig Dispense Refill    ergocalciferol (VITAMIN D2) 50,000 units Take 1 capsule (50,000 Units total) by mouth once a week 12 capsule 0    terbinafine (LamISIL) 250 mg tablet Take 1 tablet (250 mg total) by mouth daily Obtain labs for liver function 1 month after starting medication and also at completion of the three month treatment. 90 tablet 0     No current facility-administered medications on file prior to visit.      Social History     Tobacco Use    Smoking status: Never    Smokeless tobacco: Former   Vaping Use    Vaping status: Some Days    Substances: Nicotine   Substance and Sexual Activity    Alcohol use: No    Drug use: No    Sexual activity: Yes     Partners: Male     Birth control/protection: None        Objective   /76 (BP Location: Left arm, Patient Position: Sitting, Cuff Size: Standard)   Ht 5' 1\" (1.549 m)   Wt 79.4 kg (175 lb)   LMP 12/16/2024 (Exact Date)   BMI 33.07 kg/m²      Physical Exam  Vitals and nursing note reviewed.   Constitutional:       General: She is not in acute distress.     Appearance: Normal appearance.   HENT:      Head: Normocephalic.   Eyes:      Conjunctiva/sclera: Conjunctivae normal.   Pulmonary:      Effort: Pulmonary effort is normal. No respiratory distress.   Abdominal:      General: Abdomen is flat.      Palpations: Abdomen is soft.   Musculoskeletal:         General: Normal range of motion.      Cervical back: Normal range of motion.      Right lower leg: No edema.      Left lower leg: No edema.   Skin:     General: Skin is warm and dry.   Neurological:      Mental Status: She is alert and oriented to person, place, and time.   Psychiatric:         Mood and Affect: Mood normal.         Behavior: Behavior normal.         Thought Content: Thought content normal.         Judgment: Judgment normal.           "

## 2025-01-09 NOTE — PROGRESS NOTES
"Universal Protocol:  procedure performed by consultantConsent: Verbal consent obtained.  Risks and benefits: risks, benefits and alternatives were discussed  Consent given by: patient  Time out: Immediately prior to procedure a \"time out\" was called to verify the correct patient, procedure, equipment, support staff and site/side marked as required.  Timeout called at: 1/9/2025 7:30 AM.  Patient understanding: patient states understanding of the procedure being performed  Patient consent: the patient's understanding of the procedure matches consent given  Procedure consent: procedure consent matches procedure scheduled  Relevant documents: relevant documents present and verified  Site marked: the operative site was marked  Required items: required blood products, implants, devices, and special equipment available  Patient identity confirmed: verbally with patient  Remove and insert drug implant    Date/Time: 1/9/2025 7:15 AM    Performed by: CARLY Bustamante  Authorized by: Romi Fitzpatrick MD    Indication:     Indication: Presence of non-biodegradable drug delivery implant    Pre-procedure:     Pre-procedure timeout performed: yes      Local anesthetic:  Xylocaine with epinephrine    The site was cleaned and prepped in a sterile fashion: yes    Procedure:     Procedure:  Removal    Small stab incision was made in arm: yes      Left/right:  Right    Visualization of implant was obtained: yes    Comments:      Nexplanon palpated in the right  arm.   Skin was prepped in a sterile fashion.   Area was numbed and small stab incision made.   Nexplanon was easily removed without incidence. Pt tolerated procedure well.   Incision closed with steri strips, wrapped with bandage. Pt aware to keep dry and covered x 24 hours.   Aware of s/sx of infection to call with.   All questions answered.          "

## 2025-01-16 ENCOUNTER — TELEPHONE (OUTPATIENT)
Dept: PODIATRY | Facility: CLINIC | Age: 30
End: 2025-01-16

## 2025-03-06 ENCOUNTER — OFFICE VISIT (OUTPATIENT)
Dept: OBGYN CLINIC | Facility: CLINIC | Age: 30
End: 2025-03-06
Payer: COMMERCIAL

## 2025-03-06 ENCOUNTER — PREP FOR PROCEDURE (OUTPATIENT)
Dept: OBGYN CLINIC | Facility: CLINIC | Age: 30
End: 2025-03-06

## 2025-03-06 VITALS
WEIGHT: 179 LBS | HEIGHT: 61 IN | DIASTOLIC BLOOD PRESSURE: 80 MMHG | SYSTOLIC BLOOD PRESSURE: 124 MMHG | BODY MASS INDEX: 33.79 KG/M2

## 2025-03-06 DIAGNOSIS — Z01.812 ENCOUNTER FOR PRE-OPERATIVE LABORATORY TESTING: Primary | ICD-10-CM

## 2025-03-06 DIAGNOSIS — R10.2 VULVAR PAIN: ICD-10-CM

## 2025-03-06 DIAGNOSIS — N90.60 LABIAL HYPERTROPHY: Primary | ICD-10-CM

## 2025-03-06 DIAGNOSIS — N90.60 LABIAL HYPERTROPHY: ICD-10-CM

## 2025-03-06 DIAGNOSIS — N94.89 LABIAL PAIN: ICD-10-CM

## 2025-03-06 PROCEDURE — 99214 OFFICE O/P EST MOD 30 MIN: CPT | Performed by: OBSTETRICS & GYNECOLOGY

## 2025-03-06 RX ORDER — PHENTERMINE HYDROCHLORIDE 37.5 MG/1
37.5 CAPSULE ORAL EVERY MORNING
Status: ON HOLD | COMMUNITY
End: 2025-04-10 | Stop reason: ALTCHOICE

## 2025-03-06 NOTE — PROGRESS NOTES
"Name: Diamond Crain      : 1995      MRN: [id number]  Encounter Provider: Shannan Ferrara DO  Encounter Date: 3/6/2025   Encounter department: [facility]  :  Assessment & Plan  Labial hypertrophy       E consent was obtained for exam under anesthesia, bilateral labioplasty.  Risks and benefits of surgery reviewed including infection, wound breakdown, recurrence, dyspareunia, injury to adjacent structures.  Our office will contact her regarding surgical date.  She will need 3-week postop visit.  Preop labs CBC pregnancy test recommended.  All questions answered.      History of Present Illness   HPI  Diamond Crain is a 29 y.o. female who presents     \"Kimo\" is a pleasant 29-year-old female P2 ( x 2, age 6, 2) referred to me for labioplasty.  She states since her last delivery she has had a lot of external irritation, with intercourse and closing as well as working out causing external irritation.    She is sexually active, recently had Nexplanon removed and is using condoms.    She may be interested in another pregnancy in the future.        Cbc, hcg  History obtained from: patient    Review of Systems   Constitutional:  Negative for fatigue, fever and unexpected weight change.   Respiratory:  Negative for cough, chest tightness, shortness of breath and wheezing.    Cardiovascular: Negative.  Negative for chest pain and palpitations.   Gastrointestinal: Negative.  Negative for abdominal distention, abdominal pain, blood in stool, constipation, diarrhea, nausea and vomiting.   Genitourinary: Negative.  Negative for difficulty urinating, dyspareunia, dysuria, flank pain, frequency, genital sores, hematuria, pelvic pain, urgency, vaginal bleeding, vaginal discharge and vaginal pain.   Skin:  Negative for rash.     Current Outpatient Medications on File Prior to Visit   Medication Sig Dispense Refill    phentermine 37.5 MG capsule Take 37.5 mg by mouth every morning      " "ergocalciferol (VITAMIN D2) 50,000 units Take 1 capsule (50,000 Units total) by mouth once a week 12 capsule 0    norethindrone-ethinyl estradiol (Junel FE 1/20) 1-20 MG-MCG per tablet Take 1 tablet by mouth daily (Patient not taking: Reported on 3/6/2025) 84 tablet 0     No current facility-administered medications on file prior to visit.         Objective   /80   Ht 5' 1\" (1.549 m)   Wt 81.2 kg (179 lb)   LMP 02/26/2025 (Exact Date)   BMI 33.82 kg/m²      Physical Exam  Constitutional:       Appearance: Normal appearance.   Cardiovascular:      Rate and Rhythm: Normal rate and regular rhythm.   Pulmonary:      Effort: Pulmonary effort is normal.   Abdominal:      General: Bowel sounds are normal. There is no distension.      Palpations: Abdomen is soft.      Tenderness: There is no abdominal tenderness. There is no guarding or rebound.   Genitourinary:     Labia:         Right: No rash, tenderness or lesion.         Left: No rash, tenderness or lesion.       Vagina: No signs of injury. No vaginal discharge or tenderness.      Cervix: No cervical motion tenderness, discharge, friability or lesion.      Uterus: Not enlarged and not tender.       Adnexa:         Right: No mass or tenderness.          Left: No mass or tenderness.     Neurological:      Mental Status: She is alert.   Psychiatric:         Behavior: Behavior normal.     External genitalia is evident of labial minora hypertrophy, greatest wi 4 cm bilaterally    Administrative Statements   I have spent a total time of 30 minutes in caring for this patient on the day of the visit/encounter including Risks and benefits of tx options, Instructions for management, Impressions, Counseling / Coordination of care, Documenting in the medical record, Reviewing/placing orders in the medical record (including tests, medications, and/or procedures), and Obtaining or reviewing history  .  "

## 2025-03-10 ENCOUNTER — TELEPHONE (OUTPATIENT)
Dept: PSYCHIATRY | Facility: CLINIC | Age: 30
End: 2025-03-10

## 2025-03-13 ENCOUNTER — TELEPHONE (OUTPATIENT)
Dept: PSYCHIATRY | Facility: CLINIC | Age: 30
End: 2025-03-13

## 2025-03-13 NOTE — TELEPHONE ENCOUNTER
Called and left message for patient regarding NP appt scheduled 3/18/25 for talk therapy as provider does not have follow up availability. Requested call back to reschedule NP appt. Please schedule upon return call, thank you.

## 2025-03-13 NOTE — TELEPHONE ENCOUNTER
PLEASE OFFER:  Lilly Kan    3/24/25 at 9am    3/20/25 or 3/27/25 at 8am    3/28/25 at 8am    If these do not work, please transfer to [facility] support services for further assistance, thank you.

## 2025-03-14 ENCOUNTER — TELEPHONE (OUTPATIENT)
Dept: OBGYN CLINIC | Facility: CLINIC | Age: 30
End: 2025-03-14

## 2025-03-14 NOTE — TELEPHONE ENCOUNTER
----- Message from Shannan Ferrara DO sent at 3/13/2025  7:38 PM EDT -----  Regarding: sx date    [facility] GYN Department  Surgery Scheduling Sheet    Patient Name: Diamond Crain  : 1995    Provider: Shannan Ferrara DO     Needed: no; Language: N/A    Procedure: exam under anesthesia, bilateral labiaplasty     Diagnosis: b/l labial hypertrophy, pain    Special Needs or Equipment: none    Anesthesia: IV Sedation with anesthesia    Length of stay: outpatient  Does patient have comorbid conditions that will require close perioperative monitoring prior to safe discharge: no    -The patient has comorbid conditions that will require close perioperative monitoring prior to safe discharge, including N/A.   -This may require acute care beyond the usual and routine recovery period. As such, inpatient admission post-operatively is expected and appropriate, and anticipated hospital length of stay will be >2 midnights.    Pre-Admission Testing Needed: yes   Labs that should be ordered: cbc, quant HCG    Order PAT that is recommended in prep for procedure?: Yes    Medical Clearance Needed: no; Provider: N/A    MA Form Signed (tubals/hysterectomy): Not Indicated    Surgical Drink Given: no     How many days out of work: 1 day(s)     How many days no drivin day(s)       Is pre op appt needed?  no  Interval for post op appt: 3 week(s)       For Surgical Scheduler:     Surgery Scheduled On: THURS. 04/10/25-MORNING  [place]: [facility]    Pre-op Appt: COMPLETED ON 25  Post op Appt: 25  Consult/Medical clearance appt:N/A

## 2025-03-14 NOTE — TELEPHONE ENCOUNTER
.Called and spoke with pt.   Surgery is now scheduled.   Please see the [facility] OB GYN Department Surgery Scheduling Sheet in this encounter for further information.

## 2025-03-18 ENCOUNTER — TELEPHONE (OUTPATIENT)
Dept: PSYCHIATRY | Facility: CLINIC | Age: 30
End: 2025-03-18

## 2025-03-18 NOTE — TELEPHONE ENCOUNTER
Called and left message for patient regarding NP appt scheduled 3/18 for talk therapy as provider does not have follow up availability. Requested call back to reschedule NP appt. Please transfer to support services for assistance.

## 2025-03-28 ENCOUNTER — TELEPHONE (OUTPATIENT)
Age: 30
End: 2025-03-28

## 2025-03-28 NOTE — TELEPHONE ENCOUNTER
Called and spoke with pt   Informed [place] code [place] 99565  ICD 10/DX codes are N90.60, N94.89 and R10.2    Pt states she is aware that she needs to meet her deductible and then insurance covers 85% of the surgery. Pt inquiring what her 15% responsibility would be.   Gave pt the # to SL  to find out more exact out of pocket expectations.   Pt verbalized understanding

## 2025-03-28 NOTE — TELEPHONE ENCOUNTER
Pt called requesting a call back regarding pricing and CPT codes for her upcoming surgery. Please advise

## 2025-04-05 ENCOUNTER — APPOINTMENT (OUTPATIENT)
Dept: LAB | Facility: CLINIC | Age: 30
End: 2025-04-05
Payer: COMMERCIAL

## 2025-04-05 DIAGNOSIS — R10.2 VULVAR PAIN: ICD-10-CM

## 2025-04-05 DIAGNOSIS — N90.60 LABIAL HYPERTROPHY: ICD-10-CM

## 2025-04-05 DIAGNOSIS — N94.89 LABIAL PAIN: ICD-10-CM

## 2025-04-05 DIAGNOSIS — Z01.812 ENCOUNTER FOR PRE-OPERATIVE LABORATORY TESTING: ICD-10-CM

## 2025-04-05 LAB
B-HCG SERPL-ACNC: <0.6 MIU/ML (ref 0–5)
BASOPHILS # BLD AUTO: 0.03 THOUSANDS/ÂΜL (ref 0–0.1)
BASOPHILS NFR BLD AUTO: 0 % (ref 0–1)
EOSINOPHIL # BLD AUTO: 0.15 THOUSAND/ÂΜL (ref 0–0.61)
EOSINOPHIL NFR BLD AUTO: 2 % (ref 0–6)
ERYTHROCYTE [DISTWIDTH] IN BLOOD BY AUTOMATED COUNT: 13 % (ref 11.6–15.1)
HCT VFR BLD AUTO: 40.4 % (ref 34.8–46.1)
HGB BLD-MCNC: 13.5 G/DL (ref 11.5–15.4)
IMM GRANULOCYTES # BLD AUTO: 0.03 THOUSAND/UL (ref 0–0.2)
IMM GRANULOCYTES NFR BLD AUTO: 0 % (ref 0–2)
LYMPHOCYTES # BLD AUTO: 2.42 THOUSANDS/ÂΜL (ref 0.6–4.47)
LYMPHOCYTES NFR BLD AUTO: 30 % (ref 14–44)
MCH RBC QN AUTO: 30 PG (ref 26.8–34.3)
MCHC RBC AUTO-ENTMCNC: 33.4 G/DL (ref 31.4–37.4)
MCV RBC AUTO: 90 FL (ref 82–98)
MONOCYTES # BLD AUTO: 0.57 THOUSAND/ÂΜL (ref 0.17–1.22)
MONOCYTES NFR BLD AUTO: 7 % (ref 4–12)
NEUTROPHILS # BLD AUTO: 4.78 THOUSANDS/ÂΜL (ref 1.85–7.62)
NEUTS SEG NFR BLD AUTO: 61 % (ref 43–75)
NRBC BLD AUTO-RTO: 0 /100 WBCS
PLATELET # BLD AUTO: 273 THOUSANDS/UL (ref 149–390)
PMV BLD AUTO: 11.3 FL (ref 8.9–12.7)
RBC # BLD AUTO: 4.5 MILLION/UL (ref 3.81–5.12)
WBC # BLD AUTO: 7.98 THOUSAND/UL (ref 4.31–10.16)

## 2025-04-05 PROCEDURE — 36415 COLL VENOUS BLD VENIPUNCTURE: CPT

## 2025-04-05 PROCEDURE — 85025 COMPLETE CBC W/AUTO DIFF WBC: CPT

## 2025-04-05 PROCEDURE — 84702 CHORIONIC GONADOTROPIN TEST: CPT

## 2025-04-07 PROCEDURE — NC001 PR NO CHARGE: Performed by: OBSTETRICS & GYNECOLOGY

## 2025-04-07 NOTE — H&P
"H&P - OB/GYN   Name: Diamond Crain 29 y.o. female I MRN: [id number]  Unit/Bed#:  I Date of Admission: (Not on file)   Date of Service: 2025 I Hospital Day: 0     Assessment & Plan    E consent was obtained for exam under anesthesia, bilateral labioplasty. Risks and benefits of surgery reviewed including infection, wound breakdown, recurrence, dyspareunia, injury to adjacent structures. She will need 3-week postop visit.   All questions answered.         History of Present Illness     \"Kimo\" is a 29 y.o. female P2 ( x 2, age 6,4 ) referred to me for labioplasty.  She states since her last delivery she has had a lot of external irritation, with intercourse and closing as well as working out causing external irritation.     She is sexually active, recently had Nexplanon removed and is using condoms.     She may be interested in another pregnancy in the future.      .    Review of Systems   Constitutional:  Negative for fatigue, fever and unexpected weight change.   Respiratory:  Negative for cough, chest tightness, shortness of breath and wheezing.    Cardiovascular: Negative.  Negative for chest pain and palpitations.   Gastrointestinal: Negative.  Negative for abdominal distention, abdominal pain, blood in stool, constipation, diarrhea, nausea and vomiting.   Genitourinary: Negative.  Negative for difficulty urinating, dyspareunia, dysuria, flank pain, frequency, genital sores, hematuria, pelvic pain, urgency, vaginal bleeding, vaginal discharge and vaginal pain.   Skin:  Negative for rash.     Historical Information   Cannot display prior to admission medications because the patient has not been admitted in this contact.     OB/GYN History: as above    Objective :       Physical Exam  Constitutional:       Appearance: Normal appearance. She is well-developed.   HENT:      Head: Normocephalic and atraumatic.   Cardiovascular:      Rate and Rhythm: Normal rate and regular rhythm.   Pulmonary:      " Effort: Pulmonary effort is normal.      Breath sounds: Normal breath sounds.   Abdominal:      General: Bowel sounds are normal. There is no distension.      Palpations: Abdomen is soft.      Tenderness: There is no abdominal tenderness. There is no guarding or rebound.   Genitourinary:     Labia:         Right: No rash, tenderness or lesion.         Left: No rash, tenderness or lesion.       Vagina: Normal. No signs of injury. No vaginal discharge or tenderness.      Cervix: No cervical motion tenderness, discharge, friability, lesion or cervical bleeding.      Uterus: Not enlarged and not tender.       Adnexa:         Right: No mass, tenderness or fullness.          Left: No mass, tenderness or fullness.     Neurological:      Mental Status: She is alert.   Psychiatric:         Behavior: Behavior normal.     Extremities present x 4       external genitalia is evident of labial minora hypertrophy, greatest wi 4 cm bilaterally       Lab Results: I have reviewed the following results:none    Administrative Statements   I have spent a total time of 30 minutes in caring for this patient on the day of the visit/encounter including Prognosis, Risks and benefits of tx options, Instructions for management, Impressions, Counseling / Coordination of care, Documenting in the medical record, Reviewing/placing orders in the medical record (including tests, medications, and/or procedures), and Obtaining or reviewing history  .

## 2025-04-09 DIAGNOSIS — Z30.011 ENCOUNTER FOR INITIAL PRESCRIPTION OF CONTRACEPTIVE PILLS: ICD-10-CM

## 2025-04-09 RX ORDER — NORETHINDRONE ACETATE AND ETHINYL ESTRADIOL AND FERROUS FUMARATE 1MG-20(21)
1 KIT ORAL DAILY
Qty: 84 TABLET | Refills: 0 | Status: ON HOLD | OUTPATIENT
Start: 2025-04-09 | End: 2025-04-10 | Stop reason: ALTCHOICE

## 2025-04-10 ENCOUNTER — ANESTHESIA (OUTPATIENT)
Dept: PERIOP | Facility: HOSPITAL | Age: 30
End: 2025-04-10
Payer: COMMERCIAL

## 2025-04-10 ENCOUNTER — HOSPITAL ENCOUNTER (OUTPATIENT)
Facility: HOSPITAL | Age: 30
Setting detail: OUTPATIENT SURGERY
Discharge: HOME | End: 2025-04-10
Attending: OBSTETRICS & GYNECOLOGY | Admitting: OBSTETRICS & GYNECOLOGY
Payer: COMMERCIAL

## 2025-04-10 ENCOUNTER — ANESTHESIA EVENT (OUTPATIENT)
Dept: PERIOP | Facility: HOSPITAL | Age: 30
End: 2025-04-10
Payer: COMMERCIAL

## 2025-04-10 VITALS
DIASTOLIC BLOOD PRESSURE: 72 MMHG | HEART RATE: 84 BPM | OXYGEN SATURATION: 97 % | RESPIRATION RATE: 16 BRPM | SYSTOLIC BLOOD PRESSURE: 115 MMHG | TEMPERATURE: 97.2 F

## 2025-04-10 DIAGNOSIS — N90.60 LABIAL HYPERTROPHY: ICD-10-CM

## 2025-04-10 DIAGNOSIS — R10.2 VULVAR PAIN: ICD-10-CM

## 2025-04-10 DIAGNOSIS — N94.89 LABIAL PAIN: ICD-10-CM

## 2025-04-10 LAB
EXT PREGNANCY TEST URINE: NEGATIVE
EXT. CONTROL: NORMAL

## 2025-04-10 PROCEDURE — 81025 URINE PREGNANCY TEST: CPT | Performed by: OBSTETRICS & GYNECOLOGY

## 2025-04-10 PROCEDURE — 88305 TISSUE EXAM BY PATHOLOGIST: CPT | Performed by: PATHOLOGY

## 2025-04-10 PROCEDURE — 56620 VULVECTOMY SIMPLE PARTIAL: CPT | Performed by: OBSTETRICS & GYNECOLOGY

## 2025-04-10 RX ORDER — MIDAZOLAM HYDROCHLORIDE 2 MG/2ML
INJECTION, SOLUTION INTRAMUSCULAR; INTRAVENOUS AS NEEDED
Status: DISCONTINUED | OUTPATIENT
Start: 2025-04-10 | End: 2025-04-10

## 2025-04-10 RX ORDER — FENTANYL CITRATE 50 UG/ML
INJECTION, SOLUTION INTRAMUSCULAR; INTRAVENOUS AS NEEDED
Status: DISCONTINUED | OUTPATIENT
Start: 2025-04-10 | End: 2025-04-10

## 2025-04-10 RX ORDER — LIDOCAINE HYDROCHLORIDE 10 MG/ML
0.5 INJECTION, SOLUTION EPIDURAL; INFILTRATION; INTRACAUDAL; PERINEURAL ONCE AS NEEDED
Status: DISCONTINUED | OUTPATIENT
Start: 2025-04-10 | End: 2025-04-10 | Stop reason: HOSPADM

## 2025-04-10 RX ORDER — ONDANSETRON 2 MG/ML
4 INJECTION INTRAMUSCULAR; INTRAVENOUS EVERY 6 HOURS PRN
Status: DISCONTINUED | OUTPATIENT
Start: 2025-04-10 | End: 2025-04-10 | Stop reason: HOSPADM

## 2025-04-10 RX ORDER — LIDOCAINE HYDROCHLORIDE 10 MG/ML
INJECTION, SOLUTION EPIDURAL; INFILTRATION; INTRACAUDAL; PERINEURAL AS NEEDED
Status: DISCONTINUED | OUTPATIENT
Start: 2025-04-10 | End: 2025-04-10

## 2025-04-10 RX ORDER — ACETAMINOPHEN 325 MG/1
975 TABLET ORAL EVERY 6 HOURS PRN
Status: DISCONTINUED | OUTPATIENT
Start: 2025-04-10 | End: 2025-04-10 | Stop reason: HOSPADM

## 2025-04-10 RX ORDER — PROPOFOL 10 MG/ML
INJECTION, EMULSION INTRAVENOUS AS NEEDED
Status: DISCONTINUED | OUTPATIENT
Start: 2025-04-10 | End: 2025-04-10

## 2025-04-10 RX ORDER — ONDANSETRON 2 MG/ML
INJECTION INTRAMUSCULAR; INTRAVENOUS AS NEEDED
Status: DISCONTINUED | OUTPATIENT
Start: 2025-04-10 | End: 2025-04-10

## 2025-04-10 RX ORDER — HYDROMORPHONE HCL/PF 1 MG/ML
0.25 SYRINGE (ML) INJECTION
Status: DISCONTINUED | OUTPATIENT
Start: 2025-04-10 | End: 2025-04-10 | Stop reason: HOSPADM

## 2025-04-10 RX ORDER — FENTANYL CITRATE/PF 50 MCG/ML
25 SYRINGE (ML) INJECTION
Status: DISCONTINUED | OUTPATIENT
Start: 2025-04-10 | End: 2025-04-10 | Stop reason: HOSPADM

## 2025-04-10 RX ORDER — SODIUM CHLORIDE, SODIUM LACTATE, POTASSIUM CHLORIDE, CALCIUM CHLORIDE 600; 310; 30; 20 MG/100ML; MG/100ML; MG/100ML; MG/100ML
125 INJECTION, SOLUTION INTRAVENOUS CONTINUOUS
Status: DISCONTINUED | OUTPATIENT
Start: 2025-04-10 | End: 2025-04-10 | Stop reason: HOSPADM

## 2025-04-10 RX ORDER — SODIUM CHLORIDE, SODIUM LACTATE, POTASSIUM CHLORIDE, CALCIUM CHLORIDE 600; 310; 30; 20 MG/100ML; MG/100ML; MG/100ML; MG/100ML
INJECTION, SOLUTION INTRAVENOUS CONTINUOUS PRN
Status: DISCONTINUED | OUTPATIENT
Start: 2025-04-10 | End: 2025-04-10

## 2025-04-10 RX ORDER — DEXAMETHASONE SODIUM PHOSPHATE 10 MG/ML
INJECTION, SOLUTION INTRAMUSCULAR; INTRAVENOUS AS NEEDED
Status: DISCONTINUED | OUTPATIENT
Start: 2025-04-10 | End: 2025-04-10

## 2025-04-10 RX ORDER — PROPOFOL 10 MG/ML
INJECTION, EMULSION INTRAVENOUS CONTINUOUS PRN
Status: DISCONTINUED | OUTPATIENT
Start: 2025-04-10 | End: 2025-04-10

## 2025-04-10 RX ORDER — IBUPROFEN 600 MG/1
600 TABLET, FILM COATED ORAL EVERY 6 HOURS PRN
Status: DISCONTINUED | OUTPATIENT
Start: 2025-04-10 | End: 2025-04-10 | Stop reason: HOSPADM

## 2025-04-10 RX ORDER — BUPIVACAINE HYDROCHLORIDE 2.5 MG/ML
INJECTION, SOLUTION EPIDURAL; INFILTRATION; INTRACAUDAL; PERINEURAL AS NEEDED
Status: DISCONTINUED | OUTPATIENT
Start: 2025-04-10 | End: 2025-04-10 | Stop reason: HOSPADM

## 2025-04-10 RX ORDER — ONDANSETRON 2 MG/ML
4 INJECTION INTRAMUSCULAR; INTRAVENOUS ONCE AS NEEDED
Status: COMPLETED | OUTPATIENT
Start: 2025-04-10 | End: 2025-04-10

## 2025-04-10 RX ADMIN — LIDOCAINE HYDROCHLORIDE 5 ML: 10 INJECTION, SOLUTION EPIDURAL; INFILTRATION; INTRACAUDAL; PERINEURAL at 07:48

## 2025-04-10 RX ADMIN — FENTANYL CITRATE 25 MCG: 50 INJECTION INTRAMUSCULAR; INTRAVENOUS at 07:56

## 2025-04-10 RX ADMIN — ONDANSETRON 4 MG: 2 INJECTION INTRAMUSCULAR; INTRAVENOUS at 07:48

## 2025-04-10 RX ADMIN — FENTANYL CITRATE 25 MCG: 50 INJECTION INTRAMUSCULAR; INTRAVENOUS at 09:32

## 2025-04-10 RX ADMIN — FENTANYL CITRATE 25 MCG: 50 INJECTION INTRAMUSCULAR; INTRAVENOUS at 08:02

## 2025-04-10 RX ADMIN — PROPOFOL 100 MCG/KG/MIN: 10 INJECTION, EMULSION INTRAVENOUS at 07:47

## 2025-04-10 RX ADMIN — PROPOFOL 50 MG: 10 INJECTION, EMULSION INTRAVENOUS at 08:18

## 2025-04-10 RX ADMIN — FENTANYL CITRATE 25 MCG: 50 INJECTION INTRAMUSCULAR; INTRAVENOUS at 08:05

## 2025-04-10 RX ADMIN — PROPOFOL 30 MG: 10 INJECTION, EMULSION INTRAVENOUS at 08:05

## 2025-04-10 RX ADMIN — PROPOFOL 50 MG: 10 INJECTION, EMULSION INTRAVENOUS at 07:47

## 2025-04-10 RX ADMIN — PROPOFOL 20 MG: 10 INJECTION, EMULSION INTRAVENOUS at 08:02

## 2025-04-10 RX ADMIN — PROPOFOL 50 MG: 10 INJECTION, EMULSION INTRAVENOUS at 08:07

## 2025-04-10 RX ADMIN — FENTANYL CITRATE 25 MCG: 50 INJECTION INTRAMUSCULAR; INTRAVENOUS at 09:51

## 2025-04-10 RX ADMIN — ONDANSETRON 4 MG: 2 INJECTION, SOLUTION INTRAMUSCULAR; INTRAVENOUS at 09:08

## 2025-04-10 RX ADMIN — MIDAZOLAM 2 MG: 1 INJECTION INTRAMUSCULAR; INTRAVENOUS at 07:41

## 2025-04-10 RX ADMIN — SODIUM CHLORIDE, SODIUM LACTATE, POTASSIUM CHLORIDE, AND CALCIUM CHLORIDE: .6; .31; .03; .02 INJECTION, SOLUTION INTRAVENOUS at 07:42

## 2025-04-10 RX ADMIN — PROPOFOL 50 MG: 10 INJECTION, EMULSION INTRAVENOUS at 07:33

## 2025-04-10 RX ADMIN — FENTANYL CITRATE 25 MCG: 50 INJECTION INTRAMUSCULAR; INTRAVENOUS at 08:07

## 2025-04-10 RX ADMIN — PROPOFOL 10 MG: 10 INJECTION, EMULSION INTRAVENOUS at 08:44

## 2025-04-10 RX ADMIN — DEXAMETHASONE SODIUM PHOSPHATE 10 MG: 10 INJECTION, SOLUTION INTRAMUSCULAR; INTRAVENOUS at 07:48

## 2025-04-10 NOTE — ANESTHESIA POSTPROCEDURE EVALUATION
Post-Op Assessment Note    CV Status:  Stable  Pain Score: 0    Pain management: adequate    Multimodal analgesia used between 6 hours prior to anesthesia start to PACU discharge    Mental Status:  Sleepy   Hydration Status:  Euvolemic   PONV Controlled:  Controlled   Airway Patency:  Patent     Post Op Vitals Reviewed: Yes    No anethesia notable event occurred.    Staff: Anesthesiologist           Last Filed PACU Vitals:  Vitals Value Taken Time   Temp 97.4F    Pulse 84    /6    Resp 16    SpO2 97        Modified Lorri:     Vitals Value Taken Time   Activity 2 04/10/25 1005   Respiration 2 04/10/25 1005   Circulation 2 04/10/25 1005   Consciousness 2 04/10/25 1005   Oxygen Saturation 2 04/10/25 1005     Modified Lorri Score: 10

## 2025-04-10 NOTE — INTERVAL H&P NOTE
H&P reviewed. After examining the patient I find no changes in the patients condition since the H&P had been written.    Vitals:    04/10/25 0604   BP: 137/83   Pulse: 84   Resp: 16   Temp: (!) 97.3 °F (36.3 °C)   SpO2: 98%

## 2025-04-10 NOTE — ANESTHESIA PREPROCEDURE EVALUATION
Procedure:  EXAM UNDER ANESTHESIA; LABIAPLASTY (Bilateral: Perineum)    Relevant Problems   CARDIO   (+) Pain in breast        Physical Exam    Airway    Mallampati score: I  TM Distance: >3 FB  Neck ROM: full     Dental   No notable dental hx     Cardiovascular  Rhythm: regular, Rate: normal    Pulmonary   Breath sounds clear to auscultation    Other Findings  post-pubertal.      Anesthesia Plan  ASA Score- 2     Anesthesia Type- IV sedation with anesthesia with ASA Monitors.         Additional Monitors:     Airway Plan:            Plan Factors-Exercise tolerance (METS): >4 METS.    Chart reviewed.   Existing labs reviewed. Patient summary reviewed.    Patient is a current smoker.  Patient instructed to abstain from smoking on day of procedure. Patient did not smoke on day of surgery.    Obstructive sleep apnea risk education given perioperatively.        Induction- intravenous.    Postoperative Plan-         Informed Consent- Anesthetic plan and risks discussed with patient.  I personally reviewed this patient with the CRNA. Discussed and agreed on the Anesthesia Plan with the CRNA..      NPO Status:  Vitals Value Taken Time   Date of last liquid 04/09/25 04/10/25 0606   Time of last liquid 2230 04/10/25 0606   Date of last solid 04/09/25 04/10/25 0606   Time of last solid 2200 04/10/25 0606

## 2025-04-10 NOTE — OP NOTE
OPERATIVE REPORT  PATIENT NAME: Diamond Crain    :  1995  MRN: [id number]  Pt Location: [facility] OR ROOM 04    SURGERY DATE: 4/10/2025    Surgeons and Role:     * Shannan Ferrara DO - Primary     * Jarett Childress MD - Assisting    Preop Diagnosis:  Labial hypertrophy [N90.60]  Labial pain [N94.89]  Vulvar pain [R10.2]    Post-Op Diagnosis Codes:     * Labial hypertrophy [N90.60]     * Labial pain [N94.89]     * Vulvar pain [R10.2]    Procedure(s):  Bilateral - EXAM UNDER ANESTHESIA; LABIAPLASTY    Specimen(s):  ID Type Source Tests Collected by Time Destination   1 : right labia minora Tissue Labia TISSUE EXAM Shannan Ferrara DO 4/10/2025 0841    2 : left labia minora Tissue Labia TISSUE EXAM Shannan Ferrara DO 4/10/2025 0844        Estimated Blood Loss:   2 mL    Drains:  none    Anesthesia Type:   IV Sedation with Anesthesia    Operative Indications:  Labial hypertrophy [N90.60]  Labial pain [N94.89]  Vulvar pain [R10.2]      Operative Findings:  1.The labia minora extend past the labia majora in the resting state.  When extended, there is an excess approximately 1.5 cm of width at the widest portion of the labia, and the excess tissue extends throughout the length of the labia.  The excess labial tissue is darkened and is thicker than typical labial tissue.  Otherwise, normal external female genitalia.    Complications:   None apparently     Procedure and Technique:    OPERATION:  The patient was taken to the operating room where consent and identity were confirmed.  She was placed in dorsal supine position where anesthesia was initiated.  Her legs were placed in Yellofin stirrups.  Her arms were placed on arm boards at her sides.  The perineum was prepped and draped in a sterile fashion.    Attention was turned to the right labia.  Allis clamps were placed on the excess tissue and a marking pen was used to steve the excess tissue.  The excess tissue was removed with a scalpel and  Bovie cautery.  removing an adequate amount of tissue  The same procedure was performed on the left labia.  The removed excess tissue measured ~6 x 1.5 cm bilaterally.    Hemostasis was obtained with Bovie cautery and pressure.  Both skin incisions were closed in a subcuticular fashion using 4-0 Monocryl.  The incisions were injected with 5 mL of 1% plain Marcaine in total. Pressure 4x4 gauze placed on the perineum, Underwear was also placed.  She was cleaned and dried.  She was taken out of [facility].  She was awoken from anesthesia and was transferred to recovery in stable condition.  All sponge, instrument, and needle counts were correct.  I was present and actively involved throughout the entire procedure.         Patient Disposition:  PACU              SIGNATURE: Jarett Childress MD  DATE: April 10, 2025  TIME: 8:54 AM

## 2025-04-11 ENCOUNTER — NURSE TRIAGE (OUTPATIENT)
Age: 30
End: 2025-04-11

## 2025-04-11 ENCOUNTER — TELEPHONE (OUTPATIENT)
Dept: OBGYN CLINIC | Facility: CLINIC | Age: 30
End: 2025-04-11

## 2025-04-11 NOTE — TELEPHONE ENCOUNTER
"FOLLOW UP: routing to surgeon to update    REASON FOR CONVERSATION: Post-op Problem    SYMPTOMS: complains of slight drainage from incision site of labia plasty done yesterday. Drainage is scant, tinged pink. Denies odor. Denies severe pain. Has been following post op instructions. Has kept incision covered.     OTHER: Advised to continue to monitor and keep area clean. Advised can allow it to air out to help dry it out. Call back if develop foul smelling drainage, jl bleeding, severe pain, fever, chills. Patient verbalizes understanding.     DISPOSITION: Home Care      Reason for Disposition   Sutured or stapled surgical incision, questions about    Answer Assessment - Initial Assessment Questions  1. SYMPTOM: \"What's the main symptom you're concerned about?\" (e.g., drainage, incision opened up, pain, redness)      Drainage on gauze  2. ONSET: \"When did symptoms  start?\"      This morning  3. SURGERY: \"What surgery did you have?\"      Yesterday labiaplasty  4. DATE of SURGERY: \"When was the surgery?\"       yesterday  5. INCISION SITE: \"Where is the incision located?\"       labia  6. REDNESS: \"Is there any redness at the incision site?\" If Yes, ask: \"How wide across is the redness?\" (Inches, centimeters)       mild  7. PAIN: \"Is there any pain?\" If Yes, ask: \"How bad is it?\"  (Scale 1-10; or mild, moderate, severe)      soreness  8. BLEEDING: \"Is there any bleeding?\" If Yes, ask: \"How much?\" and \"Where?\"      scant  9. DRAINAGE: \"Is there any drainage from the incision site?\" If Yes, ask: \"What color and how much?\" (e.g., red, cloudy, pus; drops, teaspoon)      Scant--beige/pink colored  10. FEVER: \"Do you have a fever?\" If Yes, ask: \"What is your temperature, how was it measured, and when did it start?\"        denies  11. OTHER SYMPTOMS: \"Do you have any other symptoms?\" (e.g., dizziness, rash elsewhere on body, shaking chills, weakness)        denies    Protocols used: Post-Op Incision Symptoms and " Questions-Adult-[place]

## 2025-04-11 NOTE — TELEPHONE ENCOUNTER
Phone call the patient, returning call from this morning.  Patient is doing well.  Scant drainage noted.  Continues to use gauze dressing, ice.  Denies any pain.  Voiding without difficulty.

## 2025-04-14 PROCEDURE — 88305 TISSUE EXAM BY PATHOLOGIST: CPT | Performed by: PATHOLOGY

## 2025-04-17 ENCOUNTER — NURSE TRIAGE (OUTPATIENT)
Age: 30
End: 2025-04-17

## 2025-04-17 ENCOUNTER — OFFICE VISIT (OUTPATIENT)
Dept: OBGYN CLINIC | Facility: CLINIC | Age: 30
End: 2025-04-17

## 2025-04-17 VITALS
SYSTOLIC BLOOD PRESSURE: 112 MMHG | BODY MASS INDEX: 33.87 KG/M2 | WEIGHT: 179.4 LBS | DIASTOLIC BLOOD PRESSURE: 68 MMHG | HEIGHT: 61 IN

## 2025-04-17 DIAGNOSIS — N90.60 LABIA ENLARGED: Primary | ICD-10-CM

## 2025-04-17 PROCEDURE — 99024 POSTOP FOLLOW-UP VISIT: CPT | Performed by: OBSTETRICS & GYNECOLOGY

## 2025-04-17 NOTE — PROGRESS NOTES
"Name: Diamond Crain      : 1995      MRN: [id number]  Encounter Provider: Shannan Ferrara DO  Encounter Date: 2025   Encounter department: [facility]  :  Assessment & Plan    Patient reassured, no signs of infection.  Continue incision care 2 times per day.  All questions answered.  Keep final postop visit as scheduled 2 weeks from now.  Reviewed all precautions.    History of Present Illness   HPI  Diamond Crain is a 29 y.o. female who presents     Status post bilateral labioplasty postop 7 days concern may be infection, stitch fell loose.  She denies any drainage.  She has been using ice packs intermittently.    History obtained from: patient    Review of Systems   Constitutional:  Negative for fatigue, fever and unexpected weight change.   Respiratory:  Negative for cough, chest tightness, shortness of breath and wheezing.    Cardiovascular: Negative.  Negative for chest pain and palpitations.   Gastrointestinal: Negative.  Negative for abdominal distention, abdominal pain, blood in stool, constipation, diarrhea, nausea and vomiting.   Genitourinary: Negative.  Negative for difficulty urinating, dyspareunia, dysuria, flank pain, frequency, genital sores, hematuria, pelvic pain, urgency, vaginal bleeding, vaginal discharge and vaginal pain.   Skin:  Negative for rash.     Current Outpatient Medications on File Prior to Visit   Medication Sig Dispense Refill    Probiotic Product (PROBIOTIC DAILY PO) Take by mouth      ergocalciferol (VITAMIN D2) 50,000 units Take 1 capsule (50,000 Units total) by mouth once a week 12 capsule 0     No current facility-administered medications on file prior to visit.         Objective   /68   Ht 5' 1\" (1.549 m)   Wt 81.4 kg (179 lb 6.4 oz)   LMP 2025 (Exact Date)   BMI 33.90 kg/m²      Physical Exam      labia incision healing well bilaterally, no signs of infection, sutures intact except 1 left superior side.  There is " no drainage.    Administrative Statements   I have spent a total time of 10 minutes in caring for this patient on the day of the visit/encounter including Instructions for management, Impressions, Counseling / Coordination of care, Documenting in the medical record, Reviewing/placing orders in the medical record (including tests, medications, and/or procedures), and Obtaining or reviewing history  .

## 2025-04-17 NOTE — TELEPHONE ENCOUNTER
"  FOLLOW UP:     Attempted to schedule the patient an appt, unable to find an appt with Dr Ferrara, contacted Daria, pt was warm transferred for assistance with scheduling       REASON FOR CONVERSATION: Post-op Problem    SYMPTOMS: sutures came \"off\" and redness, and drainage of blood     OTHER: Pt calling in saying she had labiaplasty on 4/10/25, pt saying that the sutures came \"off\", and there is redness at site, small drops of blood, pain 4/10.     DISPOSITION: see in office today     Reason for Disposition   Incision looks infected (e.g., spreading redness, pus)    Answer Assessment - Initial Assessment Questions  1. SYMPTOM: \"What's the main symptom you're concerned about?\" (e.g., drainage, incision opened up, pain, redness)      Sutures came \"off\"   2. ONSET: \"When did symptom  start?\"      Yesterday   3. SURGERY: \"What surgery did you have?\"      Labiaplasty   4. DATE of SURGERY: \"When was the surgery?\"       4/10/25   5. INCISION SITE: \"Where is the incision located?\"       Labia   6. REDNESS: \"Is there any redness at the incision site?\" If Yes, ask: \"How wide across is the redness?\" (Inches, centimeters)       Redness   7. PAIN: \"Is there any pain?\" If Yes, ask: \"How bad is it?\"  (Scale 1-10; or mild, moderate, severe)      Pain is 4/10   8. BLEEDING: \"Is there any bleeding?\" If Yes, ask: \"How much?\" and \"Where?\"      Small drops of blood   9. DRAINAGE: \"Is there any drainage from the incision site?\" If Yes, ask: \"What color and how much?\" (e.g., red, cloudy, pus; drops, teaspoon)      Pt denies   10. FEVER: \"Do you have a fever?\" If Yes, ask: \"What is your temperature, how was it measured, and when did it start?\"        Pt denies   11. OTHER SYMPTOMS: \"Do you have any other symptoms?\" (e.g., dizziness, rash elsewhere on body, shaking chills, weakness)        Pt denies dizziness, rash elsewhere on body, shaking chills, weakness    Protocols used: Post-Op Incision Symptoms and Questions-Adult-[place]    "

## 2025-04-25 ENCOUNTER — OFFICE VISIT (OUTPATIENT)
Dept: FAMILY MEDICINE CLINIC | Facility: CLINIC | Age: 30
End: 2025-04-25
Payer: COMMERCIAL

## 2025-04-25 VITALS
SYSTOLIC BLOOD PRESSURE: 112 MMHG | OXYGEN SATURATION: 98 % | BODY MASS INDEX: 33.64 KG/M2 | RESPIRATION RATE: 15 BRPM | TEMPERATURE: 97.5 F | WEIGHT: 178.2 LBS | HEIGHT: 61 IN | DIASTOLIC BLOOD PRESSURE: 82 MMHG | HEART RATE: 92 BPM

## 2025-04-25 DIAGNOSIS — E78.5 HYPERLIPIDEMIA, UNSPECIFIED HYPERLIPIDEMIA TYPE: ICD-10-CM

## 2025-04-25 DIAGNOSIS — R73.03 PRE-DIABETES: ICD-10-CM

## 2025-04-25 DIAGNOSIS — N90.60 LABIAL HYPERTROPHY: ICD-10-CM

## 2025-04-25 DIAGNOSIS — E66.811 OBESITY (BMI 30.0-34.9): Primary | ICD-10-CM

## 2025-04-25 PROCEDURE — 99214 OFFICE O/P EST MOD 30 MIN: CPT | Performed by: FAMILY MEDICINE

## 2025-04-25 RX ORDER — PHENTERMINE HYDROCHLORIDE 37.5 MG/1
37.5 CAPSULE ORAL EVERY MORNING
Qty: 30 CAPSULE | Refills: 0 | Status: SHIPPED | OUTPATIENT
Start: 2025-04-25

## 2025-04-25 NOTE — ASSESSMENT & PLAN NOTE
- phentermine prescribed, side effects reviewed, discussed short term use, encouraged to continue life style changes, will refer to Weight management for further management    Orders:    Ambulatory Referral to Weight Management; Future    phentermine 37.5 MG capsule; Take 1 capsule (37.5 mg total) by mouth every morning

## 2025-04-25 NOTE — PROGRESS NOTES
Name: Diamond Crain      : 1995      MRN: [id number]  Encounter Provider: Mae Ferraro MD  Encounter Date: 2025   Encounter department: [facility]  :  Assessment & Plan  Obesity (BMI 30.0-34.9)  - phentermine prescribed, side effects reviewed, discussed short term use, encouraged to continue life style changes, will refer to Weight management for further management    Orders:    Ambulatory Referral to Weight Management; Future    phentermine 37.5 MG capsule; Take 1 capsule (37.5 mg total) by mouth every morning    Pre-diabetes  - A1c 5.6, improved from 5.9, reviewed dietary modifications    Hyperlipidemia, unspecified hyperlipidemia type  - , HDL 44, discussed low cholesterol diet and encouraged to continue regular exercise     Labial hypertrophy  - s/p bilateral labiaplasty on 4/10/25, recovering well, follow up with Gyn as scheduled on 25       Return in 2-3 months or sooner as needed.  Patient understands and agrees with the treatment plan.          History of Present Illness   Patient presents today to discuss weight loss medications. Patient was following with Weight management about 2 years ago, she was prescribed phentermine and lost about 15 lbs. She is no longer following with them and now in the past several months she has gained most of her weight back, she has been working on healthy diet and exercising regularly: she recently joined a gym and also walking without significant weight loss. She took a pause in exercise since her recent surgery labioplasty on 4/10/25 and planning to get back to her previous level of physical activity once allowed by Gyn.       Review of Systems   Constitutional:  Negative for chills, fatigue and fever.   Respiratory:  Negative for cough, shortness of breath and wheezing.    Cardiovascular:  Negative for chest pain, palpitations and leg swelling.   Gastrointestinal:  Negative for abdominal  "pain, blood in stool, constipation, diarrhea, nausea and vomiting.   Genitourinary:  Negative for difficulty urinating, dysuria, frequency, hematuria and urgency.   Neurological:  Negative for dizziness, syncope, weakness, numbness and headaches.   Hematological:  Negative for adenopathy.   Psychiatric/Behavioral:  Negative for dysphoric mood and sleep disturbance. The patient is not nervous/anxious.        Objective   /82   Pulse 92   Temp 97.5 °F (36.4 °C)   Resp 15   Ht 5' 1\" (1.549 m)   Wt 80.8 kg (178 lb 3.2 oz)   LMP 04/13/2025 (Exact Date)   SpO2 98%   BMI 33.67 kg/m²      Physical Exam  Constitutional:       General: She is not in acute distress.  Neck:      Thyroid: No thyroid mass or thyromegaly.   Cardiovascular:      Rate and Rhythm: Normal rate and regular rhythm.      Heart sounds: Normal heart sounds.   Pulmonary:      Effort: Pulmonary effort is normal.      Breath sounds: Normal breath sounds.   Abdominal:      Palpations: Abdomen is soft.      Tenderness: There is no abdominal tenderness.   Musculoskeletal:      Right lower leg: No edema.      Left lower leg: No edema.   Neurological:      Mental Status: She is alert and oriented to person, place, and time.   Psychiatric:         Mood and Affect: Mood normal.         Behavior: Behavior normal.       Lab Results   Component Value Date    HGBA1C 5.6 11/02/2024     Lab Results   Component Value Date    EIB8GMKFTUPW 0.668 11/02/2024     Lab Results   Component Value Date    WBC 7.98 04/05/2025    HGB 13.5 04/05/2025    HCT 40.4 04/05/2025    MCV 90 04/05/2025     04/05/2025     Lab Results   Component Value Date    CHOLESTEROL 172 11/02/2024    CHOLESTEROL 166 04/05/2022     Lab Results   Component Value Date    HDL 44 (L) 11/02/2024    HDL 41 (L) 04/05/2022     Lab Results   Component Value Date    TRIG 56 11/02/2024    TRIG 88 04/05/2022     Lab Results   Component Value Date    LDLCALC 117 (H) 11/02/2024     Lab Results "   Component Value Date    SODIUM 139 11/02/2024    K 4.0 11/02/2024     11/02/2024    CO2 29 11/02/2024    AGAP 4 11/02/2024    BUN 14 11/02/2024    CREATININE 0.60 11/02/2024    GLUC 106 03/13/2018    GLUF 119 (H) 11/02/2024    CALCIUM 8.8 11/02/2024    AST 13 11/02/2024    ALT 13 11/02/2024    ALKPHOS 33 (L) 11/02/2024    TP 6.9 11/02/2024    TBILI 0.25 11/02/2024    EGFR 121 11/02/2024

## 2025-04-28 ENCOUNTER — OFFICE VISIT (OUTPATIENT)
Dept: OBGYN CLINIC | Facility: CLINIC | Age: 30
End: 2025-04-28

## 2025-04-28 ENCOUNTER — NURSE TRIAGE (OUTPATIENT)
Age: 30
End: 2025-04-28

## 2025-04-28 VITALS
BODY MASS INDEX: 33.49 KG/M2 | WEIGHT: 177.4 LBS | DIASTOLIC BLOOD PRESSURE: 78 MMHG | HEIGHT: 61 IN | SYSTOLIC BLOOD PRESSURE: 118 MMHG

## 2025-04-28 DIAGNOSIS — N89.8 VAGINAL DISCHARGE: Primary | ICD-10-CM

## 2025-04-28 PROCEDURE — 99024 POSTOP FOLLOW-UP VISIT: CPT | Performed by: OBSTETRICS & GYNECOLOGY

## 2025-04-28 PROCEDURE — 81514 NFCT DS BV&VAGINITIS DNA ALG: CPT | Performed by: OBSTETRICS & GYNECOLOGY

## 2025-04-28 NOTE — TELEPHONE ENCOUNTER
"FOLLOW UP: Appointment this afternoon.     REASON FOR CONVERSATION: Post-op Problem    SYMPTOMS: open wound on left labia with yellow/beige discharge, white chunky vaginal discharge and vaginal itching/irritation    OTHER: Pt called in with post op concerns. States she had a labiaplasty on 4/10/25 with Dr. Ferrara. She was seen 4/17/25 with concerns for losing stitches/infection but was advised everything looked okay. Pt states that this past Thursday/Friday, she noticed she lost more stiches from her left labia and now has an open wound that won't scab over. States the wound size looks like it would be 7 stitches worth. She denies any bleeding but states there is a yellow/beige discharge with an off odor. There is mild redness around the wound. Pt denies any fever. States her right labia appears to be healing well. She also reports since Thursday, having thick white vaginal discharge with an off odor, not foul/fishy. She does note vaginal itching and irritation as well. Pt given appointment for this afternoon and is aware to call back with any worsening symptoms in the meantime.     DISPOSITION: Go to [facility] Now/Urgent Care      Reason for Disposition   Pus or bad-smelling fluid draining from incision    Answer Assessment - Initial Assessment Questions  1. SYMPTOM: \"What's the main symptom you're concerned about?\" (e.g., drainage, incision opened up, pain, redness)      Open wound on left labia  2. ONSET: \"When did symptoms  start?\"      Thursday/friday  3. SURGERY: \"What surgery did you have?\"      labiaplasty  4. DATE of SURGERY: \"When was the surgery?\"       4/10/25  5. INCISION SITE: \"Where is the incision located?\"       Left labia  6. REDNESS: \"Is there any redness at the incision site?\" If Yes, ask: \"How wide across is the redness?\" (Inches, centimeters)       Red around the wound  7. PAIN: \"Is there any pain?\" If Yes, ask: \"How bad is it?\"  (Scale 1-10; or mild, moderate, severe)      5/10- ice pack " "sometimes helps  8. BLEEDING: \"Is there any bleeding?\" If Yes, ask: \"How much?\" and \"Where?\"      denies  9. DRAINAGE: \"Is there any drainage from the incision site?\" If Yes, ask: \"What color and how much?\" (e.g., red, cloudy, pus; drops, teaspoon)      Small amount of pus drainage  10. FEVER: \"Do you have a fever?\" If Yes, ask: \"What is your temperature, how was it measured, and when did it start?\"        denies  11. OTHER SYMPTOMS: \"Do you have any other symptoms?\" (e.g., dizziness, rash elsewhere on body, shaking chills, weakness)        denies    Protocols used: Post-Op Incision Symptoms and Questions-Adult-[place]    "

## 2025-04-28 NOTE — PROGRESS NOTES
"Name: Diamond Crain      : 1995      MRN: [id number]  Encounter Provider: Shannan Ferrara DO  Encounter Date: 2025   Encounter department: [facility]  :  Assessment & Plan    Patient reassured no signs of infection.  Cultures were obtained for bacterial vaginosis and candidiasis has felt slightly irritated and has a long history of vaginitis.  Return to office in 2 weeks for final postop visit    History of Present Illness   HPI   Diamond Crain is a 29 y.o. female who presents     Postop day 18 status post labioplasty bilateral    Right no pain    Left feel like sting x 7, concern for infection, no drainage      History obtained from: patient    Review of Systems   Constitutional:  Negative for fatigue, fever and unexpected weight change.   Respiratory:  Negative for cough, chest tightness, shortness of breath and wheezing.    Cardiovascular: Negative.  Negative for chest pain and palpitations.   Gastrointestinal: Negative.  Negative for abdominal distention, abdominal pain, blood in stool, constipation, diarrhea, nausea and vomiting.   Genitourinary: Negative.  Negative for difficulty urinating, dyspareunia, dysuria, flank pain, frequency, genital sores, hematuria, pelvic pain, urgency, vaginal bleeding, vaginal discharge and vaginal pain.   Skin:  Negative for rash.     Current Outpatient Medications on File Prior to Visit   Medication Sig Dispense Refill    phentermine 37.5 MG capsule Take 1 capsule (37.5 mg total) by mouth every morning 30 capsule 0    Probiotic Product (PROBIOTIC DAILY PO) Take by mouth      ergocalciferol (VITAMIN D2) 50,000 units Take 1 capsule (50,000 Units total) by mouth once a week 12 capsule 0     No current facility-administered medications on file prior to visit.         Objective   /78   Ht 5' 1\" (1.549 m)   Wt 80.5 kg (177 lb 6.4 oz)   LMP 2025 (Exact Date)   BMI 33.52 kg/m²      Physical Exam  External genitalia is " evident of right labioplasty, well-healed, couple of stitches visualized.  Left labial plasty no signs of infection but slight separation.  There is no drainage.  The vagina is well estrogenized.  Scant discharge noted.  Administrative Statements   I have spent a total time of 10 minutes in caring for this patient on the day of the visit/encounter including Impressions, Counseling / Coordination of care, Documenting in the medical record, Reviewing/placing orders in the medical record (including tests, medications, and/or procedures), and Obtaining or reviewing history  .

## 2025-04-29 ENCOUNTER — RESULTS FOLLOW-UP (OUTPATIENT)
Dept: OBGYN CLINIC | Facility: CLINIC | Age: 30
End: 2025-04-29

## 2025-04-29 DIAGNOSIS — N76.0 ACUTE VAGINITIS: Primary | ICD-10-CM

## 2025-04-29 RX ORDER — METRONIDAZOLE 500 MG/1
500 TABLET ORAL EVERY 12 HOURS SCHEDULED
Qty: 14 TABLET | Refills: 0 | Status: SHIPPED | OUTPATIENT
Start: 2025-04-29 | End: 2025-05-06

## 2025-05-19 ENCOUNTER — OFFICE VISIT (OUTPATIENT)
Dept: OBGYN CLINIC | Facility: CLINIC | Age: 30
End: 2025-05-19

## 2025-05-19 VITALS
HEIGHT: 61 IN | DIASTOLIC BLOOD PRESSURE: 82 MMHG | WEIGHT: 174.8 LBS | SYSTOLIC BLOOD PRESSURE: 128 MMHG | BODY MASS INDEX: 33 KG/M2

## 2025-05-19 DIAGNOSIS — Z09 POSTOP CHECK: Primary | ICD-10-CM

## 2025-05-19 PROCEDURE — 99024 POSTOP FOLLOW-UP VISIT: CPT | Performed by: OBSTETRICS & GYNECOLOGY

## 2025-05-19 NOTE — PROGRESS NOTES
"Name: Diamond Crain      : 1995      MRN: [id number]  Encounter Provider: Shannan Ferrara DO  Encounter Date: 2025   Encounter department: [facility]  :  Assessment & Plan  Postop check       All restrictions lifted.  Patient will resume annual GYN exams with her gynecologist as scheduled.  All questions answered.      History of Present Illness   HPI  Diamond Crain is a 29 y.o. female who presents     Status post bilateral labioplasty postop 5 weeks, doing very well.  Denies any pain.  Voiding without difficulty.    History obtained from: patient    Review of Systems   Constitutional:  Negative for fatigue, fever and unexpected weight change.   Respiratory:  Negative for cough, chest tightness, shortness of breath and wheezing.    Cardiovascular: Negative.  Negative for chest pain and palpitations.   Gastrointestinal: Negative.  Negative for abdominal distention, abdominal pain, blood in stool, constipation, diarrhea, nausea and vomiting.   Genitourinary: Negative.  Negative for difficulty urinating, dyspareunia, dysuria, flank pain, frequency, genital sores, hematuria, pelvic pain, urgency, vaginal bleeding, vaginal discharge and vaginal pain.   Skin:  Negative for rash.     Medications Ordered Prior to Encounter[1]      Objective   /82   Ht 5' 1\" (1.549 m)   Wt 79.3 kg (174 lb 12.8 oz)   LMP 2025   BMI 33.03 kg/m²      Physical Exam    External genitalia, labia minora bilaterally well-healed, no signs of infection, nontender.      Administrative Statements   I have spent a total time of 10 minutes in caring for this patient on the day of the visit/encounter including Instructions for management, Impressions, Counseling / Coordination of care, Documenting in the medical record, Reviewing/placing orders in the medical record (including tests, medications, and/or procedures), and Obtaining or reviewing history  .       [1]   Current Outpatient " Medications on File Prior to Visit   Medication Sig Dispense Refill    phentermine 37.5 MG capsule Take 1 capsule (37.5 mg total) by mouth every morning 30 capsule 0    Probiotic Product (PROBIOTIC DAILY PO) Take by mouth      ergocalciferol (VITAMIN D2) 50,000 units Take 1 capsule (50,000 Units total) by mouth once a week 12 capsule 0     No current facility-administered medications on file prior to visit.

## 2025-06-27 DIAGNOSIS — E66.811 OBESITY (BMI 30.0-34.9): ICD-10-CM

## 2025-06-30 RX ORDER — PHENTERMINE HYDROCHLORIDE 37.5 MG/1
37.5 CAPSULE ORAL EVERY MORNING
Qty: 30 CAPSULE | Refills: 0 | Status: SHIPPED | OUTPATIENT
Start: 2025-06-30

## 2025-07-09 ENCOUNTER — RA CDI HCC (OUTPATIENT)
Dept: OTHER | Facility: HOSPITAL | Age: 30
End: 2025-07-09

## 2025-07-11 ENCOUNTER — TELEPHONE (OUTPATIENT)
Age: 30
End: 2025-07-11

## 2025-07-11 NOTE — TELEPHONE ENCOUNTER
Left message on machine asking if she wants the blood test or skin test. And what this is needed for (school, work, etc)

## 2025-07-31 ENCOUNTER — TELEPHONE (OUTPATIENT)
Age: 30
End: 2025-07-31

## 2025-07-31 ENCOUNTER — OFFICE VISIT (OUTPATIENT)
Dept: FAMILY MEDICINE CLINIC | Facility: CLINIC | Age: 30
End: 2025-07-31

## 2025-07-31 VITALS
RESPIRATION RATE: 16 BRPM | TEMPERATURE: 97.4 F | SYSTOLIC BLOOD PRESSURE: 120 MMHG | HEIGHT: 61 IN | DIASTOLIC BLOOD PRESSURE: 84 MMHG | WEIGHT: 170 LBS | OXYGEN SATURATION: 98 % | HEART RATE: 98 BPM | BODY MASS INDEX: 32.1 KG/M2

## 2025-07-31 DIAGNOSIS — Z11.1 SCREENING FOR TUBERCULOSIS: ICD-10-CM

## 2025-07-31 DIAGNOSIS — Z78.9 MEASLES, MUMPS, RUBELLA (MMR) VACCINATION STATUS UNKNOWN: ICD-10-CM

## 2025-07-31 DIAGNOSIS — Z01.84 IMMUNITY STATUS TESTING: ICD-10-CM

## 2025-07-31 DIAGNOSIS — Z00.00 ANNUAL PHYSICAL EXAM: Primary | ICD-10-CM

## 2025-07-31 DIAGNOSIS — Z78.9 VARICELLA VACCINATION STATUS UNKNOWN: ICD-10-CM

## 2025-07-31 PROCEDURE — 99499 UNLISTED E&M SERVICE: CPT | Performed by: STUDENT IN AN ORGANIZED HEALTH CARE EDUCATION/TRAINING PROGRAM

## 2025-08-01 ENCOUNTER — APPOINTMENT (OUTPATIENT)
Dept: LAB | Facility: CLINIC | Age: 30
End: 2025-08-01
Attending: STUDENT IN AN ORGANIZED HEALTH CARE EDUCATION/TRAINING PROGRAM
Payer: COMMERCIAL

## 2025-08-01 ENCOUNTER — TELEPHONE (OUTPATIENT)
Dept: PEDIATRICS CLINIC | Facility: CLINIC | Age: 30
End: 2025-08-01

## 2025-08-01 DIAGNOSIS — Z11.1 SCREENING FOR TUBERCULOSIS: ICD-10-CM

## 2025-08-01 DIAGNOSIS — Z01.84 IMMUNITY STATUS TESTING: ICD-10-CM

## 2025-08-01 DIAGNOSIS — Z78.9 VARICELLA VACCINATION STATUS UNKNOWN: ICD-10-CM

## 2025-08-01 DIAGNOSIS — Z78.9 MEASLES, MUMPS, RUBELLA (MMR) VACCINATION STATUS UNKNOWN: ICD-10-CM

## 2025-08-01 LAB — HBV SURFACE AG SER QL: NORMAL

## 2025-08-01 PROCEDURE — 36415 COLL VENOUS BLD VENIPUNCTURE: CPT

## 2025-08-01 PROCEDURE — 86762 RUBELLA ANTIBODY: CPT

## 2025-08-01 PROCEDURE — 86787 VARICELLA-ZOSTER ANTIBODY: CPT

## 2025-08-01 PROCEDURE — 87340 HEPATITIS B SURFACE AG IA: CPT

## 2025-08-01 PROCEDURE — 86480 TB TEST CELL IMMUN MEASURE: CPT

## 2025-08-01 PROCEDURE — 86765 RUBEOLA ANTIBODY: CPT

## 2025-08-01 PROCEDURE — 86735 MUMPS ANTIBODY: CPT

## 2025-08-02 LAB
GAMMA INTERFERON BACKGROUND BLD IA-ACNC: 0.06 IU/ML
M TB IFN-G BLD-IMP: NEGATIVE
M TB IFN-G CD4+ BCKGRND COR BLD-ACNC: 0.06 IU/ML
M TB IFN-G CD4+ BCKGRND COR BLD-ACNC: 0.09 IU/ML
MEV IGG SER IA-ACNC: >300 AU/ML
MITOGEN IGNF BCKGRD COR BLD-ACNC: 9.94 IU/ML
MUV IGG SER IA-ACNC: 22.8 AU/ML
RUBV IGG SERPL IA-ACNC: 10 INDEX
VZV IGG SER QL IA: 0.33 S/CO
VZV IGG SER QL IA: NEGATIVE

## 2025-08-05 DIAGNOSIS — Z11.59 NEED FOR HEPATITIS B SCREENING TEST: Primary | ICD-10-CM

## 2025-08-06 ENCOUNTER — TELEPHONE (OUTPATIENT)
Dept: PEDIATRICS CLINIC | Facility: CLINIC | Age: 30
End: 2025-08-06

## 2025-08-06 ENCOUNTER — APPOINTMENT (OUTPATIENT)
Dept: LAB | Facility: CLINIC | Age: 30
End: 2025-08-06
Attending: PHYSICIAN ASSISTANT
Payer: COMMERCIAL

## 2025-08-07 ENCOUNTER — CLINICAL SUPPORT (OUTPATIENT)
Dept: FAMILY MEDICINE CLINIC | Facility: CLINIC | Age: 30
End: 2025-08-07
Payer: COMMERCIAL

## 2025-08-07 DIAGNOSIS — Z23 ENCOUNTER FOR IMMUNIZATION: Primary | ICD-10-CM

## 2025-08-07 PROCEDURE — 90471 IMMUNIZATION ADMIN: CPT

## 2025-08-07 PROCEDURE — 90710 MMRV VACCINE SC: CPT

## (undated) DEVICE — PACK PBDS MAJOR GYN VAGINAL SLB

## (undated) DEVICE — PENCILETTE PUSH BUTTON COATED

## (undated) DEVICE — SPECIMEN CONTAINER STERILE PEEL PACK

## (undated) DEVICE — SUT VICRYL 4-0 SH 27 IN J415H

## (undated) DEVICE — NEEDLE 25G X 1 1/2

## (undated) DEVICE — INTENDED FOR TISSUE SEPARATION, AND OTHER PROCEDURES THAT REQUIRE A SHARP SURGICAL BLADE TO PUNCTURE OR CUT.: Brand: BARD-PARKER SAFETY BLADES SIZE 15, STERILE

## (undated) DEVICE — GLOVE PI ULTRA TOUCH SZ.7.0

## (undated) DEVICE — PREMIUM DRY TRAY LF: Brand: MEDLINE INDUSTRIES, INC.

## (undated) DEVICE — SYRINGE 10ML LL

## (undated) DEVICE — REM POLYHESIVE ADULT PATIENT RETURN ELECTRODE: Brand: VALLEYLAB

## (undated) DEVICE — INSULATED NEEDLE ELECTRODE: Brand: EDGE